# Patient Record
Sex: MALE | Race: WHITE | NOT HISPANIC OR LATINO | Employment: FULL TIME | URBAN - METROPOLITAN AREA
[De-identification: names, ages, dates, MRNs, and addresses within clinical notes are randomized per-mention and may not be internally consistent; named-entity substitution may affect disease eponyms.]

---

## 2017-07-21 ENCOUNTER — RECORDS - HEALTHEAST (OUTPATIENT)
Dept: LAB | Facility: CLINIC | Age: 57
End: 2017-07-21

## 2017-07-21 LAB
CHOLEST SERPL-MCNC: 242 MG/DL
FASTING STATUS PATIENT QL REPORTED: ABNORMAL
HDLC SERPL-MCNC: 38 MG/DL
LDLC SERPL CALC-MCNC: 160 MG/DL
LDLC SERPL CALC-MCNC: ABNORMAL MG/DL
TRIGL SERPL-MCNC: 486 MG/DL

## 2018-10-08 ENCOUNTER — RECORDS - HEALTHEAST (OUTPATIENT)
Dept: LAB | Facility: CLINIC | Age: 58
End: 2018-10-08

## 2018-10-08 LAB
ANION GAP SERPL CALCULATED.3IONS-SCNC: 6 MMOL/L (ref 5–18)
BUN SERPL-MCNC: 18 MG/DL (ref 8–22)
CALCIUM SERPL-MCNC: 10.3 MG/DL (ref 8.5–10.5)
CHLORIDE BLD-SCNC: 105 MMOL/L (ref 98–107)
CHOLEST SERPL-MCNC: 293 MG/DL
CO2 SERPL-SCNC: 27 MMOL/L (ref 22–31)
CREAT SERPL-MCNC: 1.1 MG/DL (ref 0.7–1.3)
FASTING STATUS PATIENT QL REPORTED: YES
GFR SERPL CREATININE-BSD FRML MDRD: >60 ML/MIN/1.73M2
GLUCOSE BLD-MCNC: 106 MG/DL (ref 70–125)
HDLC SERPL-MCNC: 51 MG/DL
LDLC SERPL CALC-MCNC: 221 MG/DL
POTASSIUM BLD-SCNC: 4.1 MMOL/L (ref 3.5–5)
SODIUM SERPL-SCNC: 138 MMOL/L (ref 136–145)
TRIGL SERPL-MCNC: 107 MG/DL

## 2019-08-28 ENCOUNTER — RECORDS - HEALTHEAST (OUTPATIENT)
Dept: LAB | Facility: CLINIC | Age: 59
End: 2019-08-28

## 2019-08-28 LAB
ALBUMIN SERPL-MCNC: 4.4 G/DL (ref 3.5–5)
ALP SERPL-CCNC: 40 U/L (ref 45–120)
ALT SERPL W P-5'-P-CCNC: 44 U/L (ref 0–45)
ANION GAP SERPL CALCULATED.3IONS-SCNC: 7 MMOL/L (ref 5–18)
AST SERPL W P-5'-P-CCNC: 34 U/L (ref 0–40)
BILIRUB SERPL-MCNC: 0.6 MG/DL (ref 0–1)
BUN SERPL-MCNC: 12 MG/DL (ref 8–22)
C REACTIVE PROTEIN LHE: <0.1 MG/DL (ref 0–0.8)
CALCIUM SERPL-MCNC: 10.5 MG/DL (ref 8.5–10.5)
CHLORIDE BLD-SCNC: 105 MMOL/L (ref 98–107)
CHOLEST SERPL-MCNC: 171 MG/DL
CK SERPL-CCNC: 162 U/L (ref 30–190)
CO2 SERPL-SCNC: 29 MMOL/L (ref 22–31)
CREAT SERPL-MCNC: 1.06 MG/DL (ref 0.7–1.3)
ERYTHROCYTE [SEDIMENTATION RATE] IN BLOOD BY WESTERGREN METHOD: 10 MM/HR (ref 0–15)
FASTING STATUS PATIENT QL REPORTED: YES
GFR SERPL CREATININE-BSD FRML MDRD: >60 ML/MIN/1.73M2
GLUCOSE BLD-MCNC: 97 MG/DL (ref 70–125)
HDLC SERPL-MCNC: 41 MG/DL
LDLC SERPL CALC-MCNC: 99 MG/DL
POTASSIUM BLD-SCNC: 4.8 MMOL/L (ref 3.5–5)
PROT SERPL-MCNC: 7.2 G/DL (ref 6–8)
SODIUM SERPL-SCNC: 141 MMOL/L (ref 136–145)
T4 FREE SERPL-MCNC: 0.8 NG/DL (ref 0.7–1.8)
TRIGL SERPL-MCNC: 156 MG/DL
TSH SERPL DL<=0.005 MIU/L-ACNC: 7.74 UIU/ML (ref 0.3–5)

## 2019-10-22 ENCOUNTER — RECORDS - HEALTHEAST (OUTPATIENT)
Dept: LAB | Facility: CLINIC | Age: 59
End: 2019-10-22

## 2019-10-22 LAB — TSH SERPL DL<=0.005 MIU/L-ACNC: 4.37 UIU/ML (ref 0.3–5)

## 2019-11-12 ENCOUNTER — APPOINTMENT (RX ONLY)
Dept: URBAN - METROPOLITAN AREA CLINIC 75 | Facility: CLINIC | Age: 59
Setting detail: DERMATOLOGY
End: 2019-11-12

## 2019-11-12 DIAGNOSIS — L82.0 INFLAMED SEBORRHEIC KERATOSIS: ICD-10-CM

## 2019-11-12 DIAGNOSIS — L57.8 OTHER SKIN CHANGES DUE TO CHRONIC EXPOSURE TO NONIONIZING RADIATION: ICD-10-CM

## 2019-11-12 DIAGNOSIS — Z85.828 PERSONAL HISTORY OF OTHER MALIGNANT NEOPLASM OF SKIN: ICD-10-CM

## 2019-11-12 DIAGNOSIS — D485 NEOPLASM OF UNCERTAIN BEHAVIOR OF SKIN: ICD-10-CM

## 2019-11-12 PROBLEM — D48.5 NEOPLASM OF UNCERTAIN BEHAVIOR OF SKIN: Status: ACTIVE | Noted: 2019-11-12

## 2019-11-12 PROBLEM — I10 ESSENTIAL (PRIMARY) HYPERTENSION: Status: ACTIVE | Noted: 2019-11-12

## 2019-11-12 PROBLEM — B20 HUMAN IMMUNODEFICIENCY VIRUS [HIV] DISEASE: Status: ACTIVE | Noted: 2019-11-12

## 2019-11-12 PROBLEM — E13.9 OTHER SPECIFIED DIABETES MELLITUS WITHOUT COMPLICATIONS: Status: ACTIVE | Noted: 2019-11-12

## 2019-11-12 PROBLEM — K21.9 GASTRO-ESOPHAGEAL REFLUX DISEASE WITHOUT ESOPHAGITIS: Status: ACTIVE | Noted: 2019-11-12

## 2019-11-12 PROBLEM — R23.3 SPONTANEOUS ECCHYMOSES: Status: ACTIVE | Noted: 2019-11-12

## 2019-11-12 PROCEDURE — 11102 TANGNTL BX SKIN SINGLE LES: CPT | Mod: 59

## 2019-11-12 PROCEDURE — 17110 DESTRUCTION B9 LES UP TO 14: CPT

## 2019-11-12 PROCEDURE — ? COUNSELING

## 2019-11-12 PROCEDURE — ? FULL BODY SKIN EXAM

## 2019-11-12 PROCEDURE — ? LIQUID NITROGEN

## 2019-11-12 PROCEDURE — ? DEFER

## 2019-11-12 PROCEDURE — 99203 OFFICE O/P NEW LOW 30 MIN: CPT | Mod: 25

## 2019-11-12 PROCEDURE — ? BIOPSY BY SHAVE METHOD

## 2019-11-12 PROCEDURE — ? ADDITIONAL NOTES

## 2019-11-12 ASSESSMENT — LOCATION ZONE DERM
LOCATION ZONE: TRUNK
LOCATION ZONE: ARM
LOCATION ZONE: NOSE

## 2019-11-12 ASSESSMENT — LOCATION SIMPLE DESCRIPTION DERM
LOCATION SIMPLE: RIGHT UPPER ARM
LOCATION SIMPLE: NOSE
LOCATION SIMPLE: ABDOMEN

## 2019-11-12 ASSESSMENT — LOCATION DETAILED DESCRIPTION DERM
LOCATION DETAILED: PERIUMBILICAL SKIN
LOCATION DETAILED: RIGHT ANTERIOR LATERAL PROXIMAL UPPER ARM
LOCATION DETAILED: NASAL SUPRATIP

## 2019-11-12 NOTE — PROCEDURE: LIQUID NITROGEN

## 2019-11-12 NOTE — PROCEDURE: BIOPSY BY SHAVE METHOD
Bill For Surgical Tray: no
Depth Of Biopsy: dermis
Path Notes (To The Dermatopathologist): No prior path
Electrodesiccation Text: The wound bed was treated with electrodesiccation after the biopsy was performed.
Biopsy Type: H and E
Consent: Written consent was obtained and risks were reviewed including but not limited to scarring, infection, bleeding, scabbing, incomplete removal, nerve damage and allergy to anesthesia.
X Size Of Lesion In Cm: 0
Type Of Destruction Used: Curettage
Anesthesia Volume In Cc (Will Not Render If 0): 0.5
Electrodesiccation And Curettage Text: The wound bed was treated with electrodesiccation and curettage after the biopsy was performed.
Dressing: bandage
Silver Nitrate Text: The wound bed was treated with silver nitrate after the biopsy was performed.
Was A Bandage Applied: Yes
Detail Level: Detailed
Biopsy Method: Dermablade
Hemostasis: Drysol
Lab: -[id number]
Billing Type: Third-Party Bill
Curettage Text: The wound bed was treated with curettage after the biopsy was performed.
Post-Care Instructions: I reviewed with the patient in detail post-care instructions. Patient is to keep the biopsy site dry overnight, and then apply bacitracin twice daily until healed. Patient may apply hydrogen peroxide soaks to remove any crusting.
Cryotherapy Text: The wound bed was treated with cryotherapy after the biopsy was performed.
Anesthesia Type: 1% lidocaine without epinephrine
Lab Facility: [id number]
Wound Care: Petrolatum
Notification Instructions: Patient will be notified of biopsy results. However, patient instructed to call the office if not contacted within 2 weeks.

## 2019-11-12 NOTE — PROCEDURE: ADDITIONAL NOTES
Detail Level: Simple
Additional Notes: Site of previous cyst excision. I would favor keloid at this site. It is itchy and he would like to have removed which would be best accomplished by excision. Will schedule.

## 2019-11-19 ENCOUNTER — APPOINTMENT (RX ONLY)
Dept: URBAN - METROPOLITAN AREA CLINIC 75 | Facility: CLINIC | Age: 59
Setting detail: DERMATOLOGY
End: 2019-11-19

## 2019-11-19 DIAGNOSIS — L72.8 OTHER FOLLICULAR CYSTS OF THE SKIN AND SUBCUTANEOUS TISSUE: ICD-10-CM

## 2019-11-19 PROBLEM — D48.5 NEOPLASM OF UNCERTAIN BEHAVIOR OF SKIN: Status: ACTIVE | Noted: 2019-11-19

## 2019-11-19 PROCEDURE — 11402 EXC TR-EXT B9+MARG 1.1-2 CM: CPT | Mod: 79

## 2019-11-19 PROCEDURE — 12031 INTMD RPR S/A/T/EXT 2.5 CM/<: CPT | Mod: 79

## 2019-11-19 PROCEDURE — ? EXCISION

## 2019-11-19 ASSESSMENT — LOCATION DETAILED DESCRIPTION DERM: LOCATION DETAILED: RIGHT ANTERIOR PROXIMAL UPPER ARM

## 2019-11-19 ASSESSMENT — LOCATION ZONE DERM: LOCATION ZONE: ARM

## 2019-11-19 ASSESSMENT — LOCATION SIMPLE DESCRIPTION DERM: LOCATION SIMPLE: RIGHT UPPER ARM

## 2019-11-19 NOTE — PROCEDURE: EXCISION
Bilobed Flap Text: The defect edges were debeveled with a #15 scalpel blade.  Given the location of the defect and the proximity to free margins a bilobe flap was deemed most appropriate.  Using a sterile surgical marker, an appropriate bilobe flap drawn around the defect.    The area thus outlined was incised deep to adipose tissue with a #15 scalpel blade.  The skin margins were undermined to an appropriate distance in all directions utilizing iris scissors.
Complex Repair And Dermal Autograft Text: The defect edges were debeveled with a #15 scalpel blade.  The primary defect was closed partially with a complex linear closure.  Given the location of the defect, shape of the defect and the proximity to free margins an dermal autograft was deemed most appropriate to repair the remaining defect.  The graft was trimmed to fit the size of the remaining defect.  The graft was then placed in the primary defect, oriented appropriately, and sutured into place.
Double O-Z Flap Text: The defect edges were debeveled with a #15 scalpel blade.  Given the location of the defect, shape of the defect and the proximity to free margins a Double O-Z flap was deemed most appropriate.  Using a sterile surgical marker, an appropriate transposition flap was drawn incorporating the defect and placing the expected incisions within the relaxed skin tension lines where possible. The area thus outlined was incised deep to adipose tissue with a #15 scalpel blade.  The skin margins were undermined to an appropriate distance in all directions utilizing iris scissors.
Intermediate / Complex Repair - Final Wound Length In Cm: 2
Crescentic Advancement Flap Text: The defect edges were debeveled with a #15 scalpel blade.  Given the location of the defect and the proximity to free margins a crescentic advancement flap was deemed most appropriate.  Using a sterile surgical marker, the appropriate advancement flap was drawn incorporating the defect and placing the expected incisions within the relaxed skin tension lines where possible.    The area thus outlined was incised deep to adipose tissue with a #15 scalpel blade.  The skin margins were undermined to an appropriate distance in all directions utilizing iris scissors.
Scalpel Size: 15 blade
Bill 25984 For Specimen Handling/Conveyance To Laboratory?: no
Skin Substitute Units (Will Override Primary Defect Units If Greater Than 0): 0
Complex Repair And Double Advancement Flap Text: The defect edges were debeveled with a #15 scalpel blade.  The primary defect was closed partially with a complex linear closure.  Given the location of the remaining defect, shape of the defect and the proximity to free margins a double advancement flap was deemed most appropriate for complete closure of the defect.  Using a sterile surgical marker, an appropriate advancement flap was drawn incorporating the defect and placing the expected incisions within the relaxed skin tension lines where possible.    The area thus outlined was incised deep to adipose tissue with a #15 scalpel blade.  The skin margins were undermined to an appropriate distance in all directions utilizing iris scissors.
M-Plasty Complex Repair Preamble Text (Leave Blank If You Do Not Want): Extensive wide undermining was performed.
Cheek-To-Nose Interpolation Flap Text: A decision was made to reconstruct the defect utilizing an interpolation axial flap and a staged reconstruction.  A telfa template was made of the defect.  This telfa template was then used to outline the Cheek-To-Nose Interpolation flap.  The donor area for the pedicle flap was then injected with anesthesia.  The flap was excised through the skin and subcutaneous tissue down to the layer of the underlying musculature.  The interpolation flap was carefully excised within this deep plane to maintain its blood supply.  The edges of the donor site were undermined.   The donor site was closed in a primary fashion.  The pedicle was then rotated into position and sutured.  Once the tube was sutured into place, adequate blood supply was confirmed with blanching and refill.  The pedicle was then wrapped with xeroform gauze and dressed appropriately with a telfa and gauze bandage to ensure continued blood supply and protect the attached pedicle.
Crescentic Intermediate Repair Preamble Text (Leave Blank If You Do Not Want): Undermining was performed with blunt dissection.
Interpolation Flap Text: A decision was made to reconstruct the defect utilizing an interpolation axial flap and a staged reconstruction.  A telfa template was made of the defect.  This telfa template was then used to outline the interpolation flap.  The donor area for the pedicle flap was then injected with anesthesia.  The flap was excised through the skin and subcutaneous tissue down to the layer of the underlying musculature.  The interpolation flap was carefully excised within this deep plane to maintain its blood supply.  The edges of the donor site were undermined.   The donor site was closed in a primary fashion.  The pedicle was then rotated into position and sutured.  Once the tube was sutured into place, adequate blood supply was confirmed with blanching and refill.  The pedicle was then wrapped with xeroform gauze and dressed appropriately with a telfa and gauze bandage to ensure continued blood supply and protect the attached pedicle.
Show Pathology Comment Variable: Yes
Complex Repair And Tissue Cultured Epidermal Autograft Text: The defect edges were debeveled with a #15 scalpel blade.  The primary defect was closed partially with a complex linear closure.  Given the location of the defect, shape of the defect and the proximity to free margins an tissue cultured epidermal autograft was deemed most appropriate to repair the remaining defect.  The graft was trimmed to fit the size of the remaining defect.  The graft was then placed in the primary defect, oriented appropriately, and sutured into place.
Bilobed Transposition Flap Text: The defect edges were debeveled with a #15 scalpel blade.  Given the location of the defect and the proximity to free margins a bilobed transposition flap was deemed most appropriate.  Using a sterile surgical marker, an appropriate bilobe flap drawn around the defect.    The area thus outlined was incised deep to adipose tissue with a #15 scalpel blade.  The skin margins were undermined to an appropriate distance in all directions utilizing iris scissors.
Complex Repair And Modified Advancement Flap Text: The defect edges were debeveled with a #15 scalpel blade.  The primary defect was closed partially with a complex linear closure.  Given the location of the remaining defect, shape of the defect and the proximity to free margins a modified advancement flap was deemed most appropriate for complete closure of the defect.  Using a sterile surgical marker, an appropriate advancement flap was drawn incorporating the defect and placing the expected incisions within the relaxed skin tension lines where possible.    The area thus outlined was incised deep to adipose tissue with a #15 scalpel blade.  The skin margins were undermined to an appropriate distance in all directions utilizing iris scissors.
A-T Advancement Flap Text: The defect edges were debeveled with a #15 scalpel blade.  Given the location of the defect, shape of the defect and the proximity to free margins an A-T advancement flap was deemed most appropriate.  Using a sterile surgical marker, an appropriate advancement flap was drawn incorporating the defect and placing the expected incisions within the relaxed skin tension lines where possible.    The area thus outlined was incised deep to adipose tissue with a #15 scalpel blade.  The skin margins were undermined to an appropriate distance in all directions utilizing iris scissors.
V-Y Flap Text: The defect edges were debeveled with a #15 scalpel blade.  Given the location of the defect, shape of the defect and the proximity to free margins a V-Y flap was deemed most appropriate.  Using a sterile surgical marker, an appropriate advancement flap was drawn incorporating the defect and placing the expected incisions within the relaxed skin tension lines where possible.    The area thus outlined was incised deep to adipose tissue with a #15 scalpel blade.  The skin margins were undermined to an appropriate distance in all directions utilizing iris scissors.
O-L Flap Text: The defect edges were debeveled with a #15 scalpel blade.  Given the location of the defect, shape of the defect and the proximity to free margins an O-L flap was deemed most appropriate.  Using a sterile surgical marker, an appropriate advancement flap was drawn incorporating the defect and placing the expected incisions within the relaxed skin tension lines where possible.    The area thus outlined was incised deep to adipose tissue with a #15 scalpel blade.  The skin margins were undermined to an appropriate distance in all directions utilizing iris scissors.
Complex Repair And O-T Advancement Flap Text: The defect edges were debeveled with a #15 scalpel blade.  The primary defect was closed partially with a complex linear closure.  Given the location of the remaining defect, shape of the defect and the proximity to free margins an O-T advancement flap was deemed most appropriate for complete closure of the defect.  Using a sterile surgical marker, an appropriate advancement flap was drawn incorporating the defect and placing the expected incisions within the relaxed skin tension lines where possible.    The area thus outlined was incised deep to adipose tissue with a #15 scalpel blade.  The skin margins were undermined to an appropriate distance in all directions utilizing iris scissors.
Modified Advancement Flap Text: The defect edges were debeveled with a #15 scalpel blade.  Given the location of the defect, shape of the defect and the proximity to free margins a modified advancement flap was deemed most appropriate.  Using a sterile surgical marker, an appropriate advancement flap was drawn incorporating the defect and placing the expected incisions within the relaxed skin tension lines where possible.    The area thus outlined was incised deep to adipose tissue with a #15 scalpel blade.  The skin margins were undermined to an appropriate distance in all directions utilizing iris scissors.
Mastoid Interpolation Flap Text: A decision was made to reconstruct the defect utilizing an interpolation axial flap and a staged reconstruction.  A telfa template was made of the defect.  This telfa template was then used to outline the mastoid interpolation flap.  The donor area for the pedicle flap was then injected with anesthesia.  The flap was excised through the skin and subcutaneous tissue down to the layer of the underlying musculature.  The pedicle flap was carefully excised within this deep plane to maintain its blood supply.  The edges of the donor site were undermined.   The donor site was closed in a primary fashion.  The pedicle was then rotated into position and sutured.  Once the tube was sutured into place, adequate blood supply was confirmed with blanching and refill.  The pedicle was then wrapped with xeroform gauze and dressed appropriately with a telfa and gauze bandage to ensure continued blood supply and protect the attached pedicle.
Mercedes Flap Text: The defect edges were debeveled with a #15 scalpel blade.  Given the location of the defect, shape of the defect and the proximity to free margins a Mercedes flap was deemed most appropriate.  Using a sterile surgical marker, an appropriate advancement flap was drawn incorporating the defect and placing the expected incisions within the relaxed skin tension lines where possible. The area thus outlined was incised deep to adipose tissue with a #15 scalpel blade.  The skin margins were undermined to an appropriate distance in all directions utilizing iris scissors.
Complex Repair And A-T Advancement Flap Text: The defect edges were debeveled with a #15 scalpel blade.  The primary defect was closed partially with a complex linear closure.  Given the location of the remaining defect, shape of the defect and the proximity to free margins an A-T advancement flap was deemed most appropriate for complete closure of the defect.  Using a sterile surgical marker, an appropriate advancement flap was drawn incorporating the defect and placing the expected incisions within the relaxed skin tension lines where possible.    The area thus outlined was incised deep to adipose tissue with a #15 scalpel blade.  The skin margins were undermined to an appropriate distance in all directions utilizing iris scissors.
Melolabial Interpolation Flap Text: A decision was made to reconstruct the defect utilizing an interpolation axial flap and a staged reconstruction.  A telfa template was made of the defect.  This telfa template was then used to outline the melolabial interpolation flap.  The donor area for the pedicle flap was then injected with anesthesia.  The flap was excised through the skin and subcutaneous tissue down to the layer of the underlying musculature.  The pedicle flap was carefully excised within this deep plane to maintain its blood supply.  The edges of the donor site were undermined.   The donor site was closed in a primary fashion.  The pedicle was then rotated into position and sutured.  Once the tube was sutured into place, adequate blood supply was confirmed with blanching and refill.  The pedicle was then wrapped with xeroform gauze and dressed appropriately with a telfa and gauze bandage to ensure continued blood supply and protect the attached pedicle.
O-T Advancement Flap Text: The defect edges were debeveled with a #15 scalpel blade.  Given the location of the defect, shape of the defect and the proximity to free margins an O-T advancement flap was deemed most appropriate.  Using a sterile surgical marker, an appropriate advancement flap was drawn incorporating the defect and placing the expected incisions within the relaxed skin tension lines where possible.    The area thus outlined was incised deep to adipose tissue with a #15 scalpel blade.  The skin margins were undermined to an appropriate distance in all directions utilizing iris scissors.
Trilobed Flap Text: The defect edges were debeveled with a #15 scalpel blade.  Given the location of the defect and the proximity to free margins a trilobed flap was deemed most appropriate.  Using a sterile surgical marker, an appropriate trilobed flap drawn around the defect.    The area thus outlined was incised deep to adipose tissue with a #15 scalpel blade.  The skin margins were undermined to an appropriate distance in all directions utilizing iris scissors.
Complex Repair And Xenograft Text: The defect edges were debeveled with a #15 scalpel blade.  The primary defect was closed partially with a complex linear closure.  Given the location of the defect, shape of the defect and the proximity to free margins a xenograft was deemed most appropriate to repair the remaining defect.  The graft was trimmed to fit the size of the remaining defect.  The graft was then placed in the primary defect, oriented appropriately, and sutured into place.
Island Pedicle Flap Text: The defect edges were debeveled with a #15 scalpel blade.  Given the location of the defect, shape of the defect and the proximity to free margins an island pedicle advancement flap was deemed most appropriate.  Using a sterile surgical marker, an appropriate advancement flap was drawn incorporating the defect, outlining the appropriate donor tissue and placing the expected incisions within the relaxed skin tension lines where possible.    The area thus outlined was incised deep to adipose tissue with a #15 scalpel blade.  The skin margins were undermined to an appropriate distance in all directions around the primary defect and laterally outward around the island pedicle utilizing iris scissors.  There was minimal undermining beneath the pedicle flap.
Mucosal Advancement Flap Text: Given the location of the defect, shape of the defect and the proximity to free margins a mucosal advancement flap was deemed most appropriate. Incisions were made with a 15 blade scalpel in the appropriate fashion along the cutaneous vermillion border and the mucosal lip. The remaining actinically damaged mucosal tissue was excised.  The mucosal advancement flap was then elevated to the gingival sulcus with care taken to preserve the neurovascular structures and advanced into the primary defect. Care was taken to ensure that precise realignment of the vermilion border was achieved.
Complex Repair And O-L Flap Text: The defect edges were debeveled with a #15 scalpel blade.  The primary defect was closed partially with a complex linear closure.  Given the location of the remaining defect, shape of the defect and the proximity to free margins an O-L flap was deemed most appropriate for complete closure of the defect.  Using a sterile surgical marker, an appropriate flap was drawn incorporating the defect and placing the expected incisions within the relaxed skin tension lines where possible.    The area thus outlined was incised deep to adipose tissue with a #15 scalpel blade.  The skin margins were undermined to an appropriate distance in all directions utilizing iris scissors.
Anesthesia Type: 1% lidocaine with epinephrine
Complex Repair And Skin Substitute Graft Text: The defect edges were debeveled with a #15 scalpel blade.  The primary defect was closed partially with a complex linear closure.  Given the location of the remaining defect, shape of the defect and the proximity to free margins a skin substitute graft was deemed most appropriate to repair the remaining defect.  The graft was trimmed to fit the size of the remaining defect.  The graft was then placed in the primary defect, oriented appropriately, and sutured into place.
Dorsal Nasal Flap Text: The defect edges were debeveled with a #15 scalpel blade.  Given the location of the defect and the proximity to free margins a dorsal nasal flap was deemed most appropriate.  Using a sterile surgical marker, an appropriate dorsal nasal flap was drawn around the defect.    The area thus outlined was incised deep to adipose tissue with a #15 scalpel blade.  The skin margins were undermined to an appropriate distance in all directions utilizing iris scissors.
Epidermal Closure: running
Hatchet Flap Text: The defect edges were debeveled with a #15 scalpel blade.  Given the location of the defect, shape of the defect and the proximity to free margins a hatchet flap was deemed most appropriate.  Using a sterile surgical marker, an appropriate hatchet flap was drawn incorporating the defect and placing the expected incisions within the relaxed skin tension lines where possible.    The area thus outlined was incised deep to adipose tissue with a #15 scalpel blade.  The skin margins were undermined to an appropriate distance in all directions utilizing iris scissors.
Complex Repair And Bilobe Flap Text: The defect edges were debeveled with a #15 scalpel blade.  The primary defect was closed partially with a complex linear closure.  Given the location of the remaining defect, shape of the defect and the proximity to free margins a bilobe flap was deemed most appropriate for complete closure of the defect.  Using a sterile surgical marker, an appropriate advancement flap was drawn incorporating the defect and placing the expected incisions within the relaxed skin tension lines where possible.    The area thus outlined was incised deep to adipose tissue with a #15 scalpel blade.  The skin margins were undermined to an appropriate distance in all directions utilizing iris scissors.
Medical Necessity Information: It is in your best interest to select a reason for this procedure from the list below. All of these items fulfill various CMS LCD requirements except lesion extends to a margin.
Paramedian Forehead Flap Text: A decision was made to reconstruct the defect utilizing an interpolation axial flap and a staged reconstruction.  A telfa template was made of the defect.  This telfa template was then used to outline the paramedian forehead pedicle flap.  The donor area for the pedicle flap was then injected with anesthesia.  The flap was excised through the skin and subcutaneous tissue down to the layer of the underlying musculature.  The pedicle flap was carefully excised within this deep plane to maintain its blood supply.  The edges of the donor site were undermined.   The donor site was closed in a primary fashion.  The pedicle was then rotated into position and sutured.  Once the tube was sutured into place, adequate blood supply was confirmed with blanching and refill.  The pedicle was then wrapped with xeroform gauze and dressed appropriately with a telfa and gauze bandage to ensure continued blood supply and protect the attached pedicle.
Island Pedicle Flap With Canthal Suspension Text: The defect edges were debeveled with a #15 scalpel blade.  Given the location of the defect, shape of the defect and the proximity to free margins an island pedicle advancement flap was deemed most appropriate.  Using a sterile surgical marker, an appropriate advancement flap was drawn incorporating the defect, outlining the appropriate donor tissue and placing the expected incisions within the relaxed skin tension lines where possible. The area thus outlined was incised deep to adipose tissue with a #15 scalpel blade.  The skin margins were undermined to an appropriate distance in all directions around the primary defect and laterally outward around the island pedicle utilizing iris scissors.  There was minimal undermining beneath the pedicle flap. A suspension suture was placed in the canthal tendon to prevent tension and prevent ectropion.
Posterior Auricular Interpolation Flap Text: A decision was made to reconstruct the defect utilizing an interpolation axial flap and a staged reconstruction.  A telfa template was made of the defect.  This telfa template was then used to outline the posterior auricular interpolation flap.  The donor area for the pedicle flap was then injected with anesthesia.  The flap was excised through the skin and subcutaneous tissue down to the layer of the underlying musculature.  The pedicle flap was carefully excised within this deep plane to maintain its blood supply.  The edges of the donor site were undermined.   The donor site was closed in a primary fashion.  The pedicle was then rotated into position and sutured.  Once the tube was sutured into place, adequate blood supply was confirmed with blanching and refill.  The pedicle was then wrapped with xeroform gauze and dressed appropriately with a telfa and gauze bandage to ensure continued blood supply and protect the attached pedicle.
Path Notes (To The Dermatopathologist): Please check margins.
Lip Wedge Excision Repair Text: Given the location of the defect and the proximity to free margins a full thickness wedge repair was deemed most appropriate.  Using a sterile surgical marker, the appropriate repair was drawn incorporating the defect and placing the expected incisions perpendicular to the vermilion border.  The vermilion border was also meticulously outlined to ensure appropriate reapproximation during the repair.  The area thus outlined was incised through and through with a #15 scalpel blade.  The muscularis and dermis were reaproximated with deep sutures following hemostasis. Care was taken to realign the vermilion border before proceeding with the superficial closure.  Once the vermilion was realigned the superfical and mucosal closure was finished.
Alar Island Pedicle Flap Text: The defect edges were debeveled with a #15 scalpel blade.  Given the location of the defect, shape of the defect and the proximity to the alar rim an island pedicle advancement flap was deemed most appropriate.  Using a sterile surgical marker, an appropriate advancement flap was drawn incorporating the defect, outlining the appropriate donor tissue and placing the expected incisions within the nasal ala running parallel to the alar rim. The area thus outlined was incised with a #15 scalpel blade.  The skin margins were undermined minimally to an appropriate distance in all directions around the primary defect and laterally outward around the island pedicle utilizing iris scissors.  There was minimal undermining beneath the pedicle flap.
Complex Repair And Melolabial Flap Text: The defect edges were debeveled with a #15 scalpel blade.  The primary defect was closed partially with a complex linear closure.  Given the location of the remaining defect, shape of the defect and the proximity to free margins a melolabial flap was deemed most appropriate for complete closure of the defect.  Using a sterile surgical marker, an appropriate advancement flap was drawn incorporating the defect and placing the expected incisions within the relaxed skin tension lines where possible.    The area thus outlined was incised deep to adipose tissue with a #15 scalpel blade.  The skin margins were undermined to an appropriate distance in all directions utilizing iris scissors.
Rotation Flap Text: The defect edges were debeveled with a #15 scalpel blade.  Given the location of the defect, shape of the defect and the proximity to free margins a rotation flap was deemed most appropriate.  Using a sterile surgical marker, an appropriate rotation flap was drawn incorporating the defect and placing the expected incisions within the relaxed skin tension lines where possible.    The area thus outlined was incised deep to adipose tissue with a #15 scalpel blade.  The skin margins were undermined to an appropriate distance in all directions utilizing iris scissors.
Fusiform Excision Additional Text (Leave Blank If You Do Not Want): The margin was drawn around the clinically apparent lesion.  A fusiform shape was then drawn on the skin incorporating the lesion and margins.  Incisions were then made along these lines to the appropriate tissue plane and the lesion was extirpated.
Saucerization Excision Additional Text (Leave Blank If You Do Not Want): The margin was drawn around the clinically apparent lesion.  Incisions were then made along these lines, in a tangential fashion, to the appropriate tissue plane and the lesion was extirpated.
Consent was obtained from the patient. The risks and benefits to therapy were discussed in detail. Specifically, the risks of infection, scarring, bleeding, prolonged wound healing, incomplete removal, allergy to anesthesia, nerve injury and recurrence were addressed. Prior to the procedure, the treatment site was clearly identified and confirmed by the patient. All components of Universal Protocol/PAUSE Rule completed.
Ftsg Text: The defect edges were debeveled with a #15 scalpel blade.  Given the location of the defect, shape of the defect and the proximity to free margins a full thickness skin graft was deemed most appropriate.  Using a sterile surgical marker, the primary defect shape was transferred to the donor site. The area thus outlined was incised deep to adipose tissue with a #15 scalpel blade.  The harvested graft was then trimmed of adipose tissue until only dermis and epidermis was left.  The skin margins of the secondary defect were undermined to an appropriate distance in all directions utilizing iris scissors.  The secondary defect was closed with interrupted buried subcutaneous sutures.  The skin edges were then re-apposed with running  sutures.  The skin graft was then placed in the primary defect and oriented appropriately.
Double Island Pedicle Flap Text: The defect edges were debeveled with a #15 scalpel blade.  Given the location of the defect, shape of the defect and the proximity to free margins a double island pedicle advancement flap was deemed most appropriate.  Using a sterile surgical marker, an appropriate advancement flap was drawn incorporating the defect, outlining the appropriate donor tissue and placing the expected incisions within the relaxed skin tension lines where possible.    The area thus outlined was incised deep to adipose tissue with a #15 scalpel blade.  The skin margins were undermined to an appropriate distance in all directions around the primary defect and laterally outward around the island pedicle utilizing iris scissors.  There was minimal undermining beneath the pedicle flap.
Eliptical Excision Additional Text (Leave Blank If You Do Not Want): The margin was drawn around the clinically apparent lesion.  An elliptical shape was then drawn on the skin incorporating the lesion and margins.  Incisions were then made along these lines to the appropriate tissue plane and the lesion was extirpated.
Spiral Flap Text: The defect edges were debeveled with a #15 scalpel blade.  Given the location of the defect, shape of the defect and the proximity to free margins a spiral flap was deemed most appropriate.  Using a sterile surgical marker, an appropriate rotation flap was drawn incorporating the defect and placing the expected incisions within the relaxed skin tension lines where possible. The area thus outlined was incised deep to adipose tissue with a #15 scalpel blade.  The skin margins were undermined to an appropriate distance in all directions utilizing iris scissors.
Complex Repair And Rotation Flap Text: The defect edges were debeveled with a #15 scalpel blade.  The primary defect was closed partially with a complex linear closure.  Given the location of the remaining defect, shape of the defect and the proximity to free margins a rotation flap was deemed most appropriate for complete closure of the defect.  Using a sterile surgical marker, an appropriate advancement flap was drawn incorporating the defect and placing the expected incisions within the relaxed skin tension lines where possible.    The area thus outlined was incised deep to adipose tissue with a #15 scalpel blade.  The skin margins were undermined to an appropriate distance in all directions utilizing iris scissors.
Deep Sutures: 4-0 Monocryl
Size Of Lesion In Cm: 1.1
Complex Repair And Transposition Flap Text: The defect edges were debeveled with a #15 scalpel blade.  The primary defect was closed partially with a complex linear closure.  Given the location of the remaining defect, shape of the defect and the proximity to free margins a transposition flap was deemed most appropriate for complete closure of the defect.  Using a sterile surgical marker, an appropriate advancement flap was drawn incorporating the defect and placing the expected incisions within the relaxed skin tension lines where possible.    The area thus outlined was incised deep to adipose tissue with a #15 scalpel blade.  The skin margins were undermined to an appropriate distance in all directions utilizing iris scissors.
Transposition Flap Text: The defect edges were debeveled with a #15 scalpel blade.  Given the location of the defect and the proximity to free margins a transposition flap was deemed most appropriate.  Using a sterile surgical marker, an appropriate transposition flap was drawn incorporating the defect.    The area thus outlined was incised deep to adipose tissue with a #15 scalpel blade.  The skin margins were undermined to an appropriate distance in all directions utilizing iris scissors.
Slit Excision Additional Text (Leave Blank If You Do Not Want): A linear line was drawn on the skin overlying the lesion. An incision was made slowly until the lesion was visualized.  Once visualized, the lesion was removed with blunt dissection.
Star Wedge Flap Text: The defect edges were debeveled with a #15 scalpel blade.  Given the location of the defect, shape of the defect and the proximity to free margins a star wedge flap was deemed most appropriate.  Using a sterile surgical marker, an appropriate rotation flap was drawn incorporating the defect and placing the expected incisions within the relaxed skin tension lines where possible. The area thus outlined was incised deep to adipose tissue with a #15 scalpel blade.  The skin margins were undermined to an appropriate distance in all directions utilizing iris scissors.
Complex Repair And Rhombic Flap Text: The defect edges were debeveled with a #15 scalpel blade.  The primary defect was closed partially with a complex linear closure.  Given the location of the remaining defect, shape of the defect and the proximity to free margins a rhombic flap was deemed most appropriate for complete closure of the defect.  Using a sterile surgical marker, an appropriate advancement flap was drawn incorporating the defect and placing the expected incisions within the relaxed skin tension lines where possible.    The area thus outlined was incised deep to adipose tissue with a #15 scalpel blade.  The skin margins were undermined to an appropriate distance in all directions utilizing iris scissors.
Split-Thickness Skin Graft Text: The defect edges were debeveled with a #15 scalpel blade.  Given the location of the defect, shape of the defect and the proximity to free margins a split thickness skin graft was deemed most appropriate.  Using a sterile surgical marker, the primary defect shape was transferred to the donor site. The split thickness graft was then harvested.  The skin graft was then placed in the primary defect and oriented appropriately.
Island Pedicle Flap-Requiring Vessel Identification Text: The defect edges were debeveled with a #15 scalpel blade.  Given the location of the defect, shape of the defect and the proximity to free margins an island pedicle advancement flap was deemed most appropriate.  Using a sterile surgical marker, an appropriate advancement flap was drawn, based on the axial vessel mentioned above, incorporating the defect, outlining the appropriate donor tissue and placing the expected incisions within the relaxed skin tension lines where possible.    The area thus outlined was incised deep to adipose tissue with a #15 scalpel blade.  The skin margins were undermined to an appropriate distance in all directions around the primary defect and laterally outward around the island pedicle utilizing iris scissors.  There was minimal undermining beneath the pedicle flap.
Wound Care: Petrolatum
Dressing: dry sterile dressing
Additional Anesthesia Volume In Cc: 6
O-T Plasty Text: The defect edges were debeveled with a #15 scalpel blade.  Given the location of the defect, shape of the defect and the proximity to free margins an O-T plasty was deemed most appropriate.  Using a sterile surgical marker, an appropriate O-T plasty was drawn incorporating the defect and placing the expected incisions within the relaxed skin tension lines where possible.    The area thus outlined was incised deep to adipose tissue with a #15 scalpel blade.  The skin margins were undermined to an appropriate distance in all directions utilizing iris scissors.
Excisional Biopsy Additional Text (Leave Blank If You Do Not Want): The margin was drawn around the clinically apparent lesion. An elliptical shape was then drawn on the skin incorporating the lesion and margins.  Incisions were then made along these lines to the appropriate tissue plane and the lesion was extirpated.
Muscle Hinge Flap Text: The defect edges were debeveled with a #15 scalpel blade.  Given the size, depth and location of the defect and the proximity to free margins a muscle hinge flap was deemed most appropriate.  Using a sterile surgical marker, an appropriate hinge flap was drawn incorporating the defect. The area thus outlined was incised with a #15 scalpel blade.  The skin margins were undermined to an appropriate distance in all directions utilizing iris scissors.
Complex Repair And V-Y Plasty Text: The defect edges were debeveled with a #15 scalpel blade.  The primary defect was closed partially with a complex linear closure.  Given the location of the remaining defect, shape of the defect and the proximity to free margins a V-Y plasty was deemed most appropriate for complete closure of the defect.  Using a sterile surgical marker, an appropriate advancement flap was drawn incorporating the defect and placing the expected incisions within the relaxed skin tension lines where possible.    The area thus outlined was incised deep to adipose tissue with a #15 scalpel blade.  The skin margins were undermined to an appropriate distance in all directions utilizing iris scissors.
Cartilage Graft Text: The defect edges were debeveled with a #15 scalpel blade.  Given the location of the defect, shape of the defect, the fact the defect involved a full thickness cartilage defect a cartilage graft was deemed most appropriate.  An appropriate donor site was identified, cleansed, and anesthetized. The cartilage graft was then harvested and transferred to the recipient site, oriented appropriately and then sutured into place.  The secondary defect was then repaired using a primary closure.
Keystone Flap Text: The defect edges were debeveled with a #15 scalpel blade.  Given the location of the defect, shape of the defect a keystone flap was deemed most appropriate.  Using a sterile surgical marker, an appropriate keystone flap was drawn incorporating the defect, outlining the appropriate donor tissue and placing the expected incisions within the relaxed skin tension lines where possible. The area thus outlined was incised deep to adipose tissue with a #15 scalpel blade.  The skin margins were undermined to an appropriate distance in all directions around the primary defect and laterally outward around the flap utilizing iris scissors.
O-Z Plasty Text: The defect edges were debeveled with a #15 scalpel blade.  Given the location of the defect, shape of the defect and the proximity to free margins an O-Z plasty (double transposition flap) was deemed most appropriate.  Using a sterile surgical marker, the appropriate transposition flaps were drawn incorporating the defect and placing the expected incisions within the relaxed skin tension lines where possible.    The area thus outlined was incised deep to adipose tissue with a #15 scalpel blade.  The skin margins were undermined to an appropriate distance in all directions utilizing iris scissors.  Hemostasis was achieved with electrocautery.  The flaps were then transposed into place, one clockwise and the other counterclockwise, and anchored with interrupted buried subcutaneous sutures.
Melolabial Transposition Flap Text: The defect edges were debeveled with a #15 scalpel blade.  Given the location of the defect and the proximity to free margins a melolabial flap was deemed most appropriate.  Using a sterile surgical marker, an appropriate melolabial transposition flap was drawn incorporating the defect.    The area thus outlined was incised deep to adipose tissue with a #15 scalpel blade.  The skin margins were undermined to an appropriate distance in all directions utilizing iris scissors.
Complex Repair And M Plasty Text: The defect edges were debeveled with a #15 scalpel blade.  The primary defect was closed partially with a complex linear closure.  Given the location of the remaining defect, shape of the defect and the proximity to free margins an M plasty was deemed most appropriate for complete closure of the defect.  Using a sterile surgical marker, an appropriate advancement flap was drawn incorporating the defect and placing the expected incisions within the relaxed skin tension lines where possible.    The area thus outlined was incised deep to adipose tissue with a #15 scalpel blade.  The skin margins were undermined to an appropriate distance in all directions utilizing iris scissors.
Home Suture Removal Text: Patient was provided a home suture removal kit and will remove their sutures at home.  If they have any questions or difficulties they will call the office.
Epidermal Autograft Text: The defect edges were debeveled with a #15 scalpel blade.  Given the location of the defect, shape of the defect and the proximity to free margins an epidermal autograft was deemed most appropriate.  Using a sterile surgical marker, the primary defect shape was transferred to the donor site. The epidermal graft was then harvested.  The skin graft was then placed in the primary defect and oriented appropriately.
Perilesional Excision Additional Text (Leave Blank If You Do Not Want): The margin was drawn around the clinically apparent lesion. Incisions were then made along these lines to the appropriate tissue plane and the lesion was extirpated.
Composite Graft Text: The defect edges were debeveled with a #15 scalpel blade.  Given the location of the defect, shape of the defect, the proximity to free margins and the fact the defect was full thickness a composite graft was deemed most appropriate.  The defect was outline and then transferred to the donor site.  A full thickness graft was then excised from the donor site. The graft was then placed in the primary defect, oriented appropriately and then sutured into place.  The secondary defect was then repaired using a primary closure.
Post-Care Instructions: I reviewed with the patient in detail post-care instructions. Patient is not to engage in any heavy lifting, exercise, or swimming for the next 14 days. Should the patient develop any fevers, chills, bleeding, severe pain patient will contact the office immediately.
Dermal Autograft Text: The defect edges were debeveled with a #15 scalpel blade.  Given the location of the defect, shape of the defect and the proximity to free margins a dermal autograft was deemed most appropriate.  Using a sterile surgical marker, the primary defect shape was transferred to the donor site. The area thus outlined was incised deep to adipose tissue with a #15 scalpel blade.  The harvested graft was then trimmed of adipose and epidermal tissue until only dermis was left.  The skin graft was then placed in the primary defect and oriented appropriately.
Double O-Z Plasty Text: The defect edges were debeveled with a #15 scalpel blade.  Given the location of the defect, shape of the defect and the proximity to free margins a Double O-Z plasty (double transposition flap) was deemed most appropriate.  Using a sterile surgical marker, the appropriate transposition flaps were drawn incorporating the defect and placing the expected incisions within the relaxed skin tension lines where possible. The area thus outlined was incised deep to adipose tissue with a #15 scalpel blade.  The skin margins were undermined to an appropriate distance in all directions utilizing iris scissors.  Hemostasis was achieved with electrocautery.  The flaps were then transposed into place, one clockwise and the other counterclockwise, and anchored with interrupted buried subcutaneous sutures.
Complex Repair And Double M Plasty Text: The defect edges were debeveled with a #15 scalpel blade.  The primary defect was closed partially with a complex linear closure.  Given the location of the remaining defect, shape of the defect and the proximity to free margins a double M plasty was deemed most appropriate for complete closure of the defect.  Using a sterile surgical marker, an appropriate advancement flap was drawn incorporating the defect and placing the expected incisions within the relaxed skin tension lines where possible.    The area thus outlined was incised deep to adipose tissue with a #15 scalpel blade.  The skin margins were undermined to an appropriate distance in all directions utilizing iris scissors.
Epidermal Closure Graft Donor Site (Optional): simple interrupted
Rhombic Flap Text: The defect edges were debeveled with a #15 scalpel blade.  Given the location of the defect and the proximity to free margins a rhombic flap was deemed most appropriate.  Using a sterile surgical marker, an appropriate rhombic flap was drawn incorporating the defect.    The area thus outlined was incised deep to adipose tissue with a #15 scalpel blade.  The skin margins were undermined to an appropriate distance in all directions utilizing iris scissors.
No Repair - Repaired With Adjacent Surgical Defect Text (Leave Blank If You Do Not Want): After the excision the defect was repaired concurrently with another surgical defect which was in close approximation.
Complex Repair And W Plasty Text: The defect edges were debeveled with a #15 scalpel blade.  The primary defect was closed partially with a complex linear closure.  Given the location of the remaining defect, shape of the defect and the proximity to free margins a W plasty was deemed most appropriate for complete closure of the defect.  Using a sterile surgical marker, an appropriate advancement flap was drawn incorporating the defect and placing the expected incisions within the relaxed skin tension lines where possible.    The area thus outlined was incised deep to adipose tissue with a #15 scalpel blade.  The skin margins were undermined to an appropriate distance in all directions utilizing iris scissors.
Skin Substitute Text: The defect edges were debeveled with a #15 scalpel blade.  Given the location of the defect, shape of the defect and the proximity to free margins a skin substitute graft was deemed most appropriate.  The graft material was trimmed to fit the size of the defect. The graft was then placed in the primary defect and oriented appropriately.
Where Do You Want The Question To Include Opioid Counseling Located?: Case Summary Tab
Hemostasis: Electrocautery
S Plasty Text: Given the location and shape of the defect, and the orientation of relaxed skin tension lines, an S-plasty was deemed most appropriate for repair.  Using a sterile surgical marker, the appropriate outline of the S-plasty was drawn, incorporating the defect and placing the expected incisions within the relaxed skin tension lines where possible.  The area thus outlined was incised deep to adipose tissue with a #15 scalpel blade.  The skin margins were undermined to an appropriate distance in all directions utilizing iris scissors. The skin flaps were advanced over the defect.  The opposing margins were then approximated with interrupted buried subcutaneous sutures.
Repair Performed By Another Provider Text (Leave Blank If You Do Not Want): After the tissue was excised the defect was repaired by another provider.
Rhomboid Transposition Flap Text: The defect edges were debeveled with a #15 scalpel blade.  Given the location of the defect and the proximity to free margins a rhomboid transposition flap was deemed most appropriate.  Using a sterile surgical marker, an appropriate rhomboid flap was drawn incorporating the defect.    The area thus outlined was incised deep to adipose tissue with a #15 scalpel blade.  The skin margins were undermined to an appropriate distance in all directions utilizing iris scissors.
Excision Method: Elliptical
Detail Level: Detailed
Helical Rim Advancement Flap Text: The defect edges were debeveled with a #15 blade scalpel.  Given the location of the defect and the proximity to free margins (helical rim) a double helical rim advancement flap was deemed most appropriate.  Using a sterile surgical marker, the appropriate advancement flaps were drawn incorporating the defect and placing the expected incisions between the helical rim and antihelix where possible.  The area thus outlined was incised through and through with a #15 scalpel blade.  With a skin hook and iris scissors, the flaps were gently and sharply undermined and freed up.
Estimated Blood Loss (Cc): minimal
Medical Necessity Clause: This procedure was medically necessary because the lesion that was treated was:
Advancement Flap (Single) Text: The defect edges were debeveled with a #15 scalpel blade.  Given the location of the defect and the proximity to free margins a single advancement flap was deemed most appropriate.  Using a sterile surgical marker, an appropriate advancement flap was drawn incorporating the defect and placing the expected incisions within the relaxed skin tension lines where possible.    The area thus outlined was incised deep to adipose tissue with a #15 scalpel blade.  The skin margins were undermined to an appropriate distance in all directions utilizing iris scissors.
V-Y Plasty Text: The defect edges were debeveled with a #15 scalpel blade.  Given the location of the defect, shape of the defect and the proximity to free margins an V-Y advancement flap was deemed most appropriate.  Using a sterile surgical marker, an appropriate advancement flap was drawn incorporating the defect and placing the expected incisions within the relaxed skin tension lines where possible.    The area thus outlined was incised deep to adipose tissue with a #15 scalpel blade.  The skin margins were undermined to an appropriate distance in all directions utilizing iris scissors.
Information: Selecting Yes will display possible errors in your note based on the variables you have selected. This validation is only offered as a suggestion for you. PLEASE NOTE THAT THE VALIDATION TEXT WILL BE REMOVED WHEN YOU FINALIZE YOUR NOTE. IF YOU WANT TO FAX A PRELIMINARY NOTE YOU WILL NEED TO TOGGLE THIS TO 'NO' IF YOU DO NOT WANT IT IN YOUR FAXED NOTE.
Bi-Rhombic Flap Text: The defect edges were debeveled with a #15 scalpel blade.  Given the location of the defect and the proximity to free margins a bi-rhombic flap was deemed most appropriate.  Using a sterile surgical marker, an appropriate rhombic flap was drawn incorporating the defect. The area thus outlined was incised deep to adipose tissue with a #15 scalpel blade.  The skin margins were undermined to an appropriate distance in all directions utilizing iris scissors.
Complex Repair And Z Plasty Text: The defect edges were debeveled with a #15 scalpel blade.  The primary defect was closed partially with a complex linear closure.  Given the location of the remaining defect, shape of the defect and the proximity to free margins a Z plasty was deemed most appropriate for complete closure of the defect.  Using a sterile surgical marker, an appropriate advancement flap was drawn incorporating the defect and placing the expected incisions within the relaxed skin tension lines where possible.    The area thus outlined was incised deep to adipose tissue with a #15 scalpel blade.  The skin margins were undermined to an appropriate distance in all directions utilizing iris scissors.
Graft Donor Site Bandage (Optional-Leave Blank If You Don't Want In Note): Steri-strips and a pressure bandage were applied to the donor site.
Tissue Cultured Epidermal Autograft Text: The defect edges were debeveled with a #15 scalpel blade.  Given the location of the defect, shape of the defect and the proximity to free margins a tissue cultured epidermal autograft was deemed most appropriate.  The graft was then trimmed to fit the size of the defect.  The graft was then placed in the primary defect and oriented appropriately.
Lab: -9
Advancement Flap (Double) Text: The defect edges were debeveled with a #15 scalpel blade.  Given the location of the defect and the proximity to free margins a double advancement flap was deemed most appropriate.  Using a sterile surgical marker, the appropriate advancement flaps were drawn incorporating the defect and placing the expected incisions within the relaxed skin tension lines where possible.    The area thus outlined was incised deep to adipose tissue with a #15 scalpel blade.  The skin margins were undermined to an appropriate distance in all directions utilizing iris scissors.
W Plasty Text: The lesion was extirpated to the level of the fat with a #15 scalpel blade.  Given the location of the defect, shape of the defect and the proximity to free margins a W-plasty was deemed most appropriate for repair.  Using a sterile surgical marker, the appropriate transposition arms of the W-plasty were drawn incorporating the defect and placing the expected incisions within the relaxed skin tension lines where possible.    The area thus outlined was incised deep to adipose tissue with a #15 scalpel blade.  The skin margins were undermined to an appropriate distance in all directions utilizing iris scissors.  The opposing transposition arms were then transposed into place in opposite direction and anchored with interrupted buried subcutaneous sutures.
Bilateral Helical Rim Advancement Flap Text: The defect edges were debeveled with a #15 blade scalpel.  Given the location of the defect and the proximity to free margins (helical rim) a bilateral helical rim advancement flap was deemed most appropriate.  Using a sterile surgical marker, the appropriate advancement flaps were drawn incorporating the defect and placing the expected incisions between the helical rim and antihelix where possible.  The area thus outlined was incised through and through with a #15 scalpel blade.  With a skin hook and iris scissors, the flaps were gently and sharply undermined and freed up.
Xenograft Text: The defect edges were debeveled with a #15 scalpel blade.  Given the location of the defect, shape of the defect and the proximity to free margins a xenograft was deemed most appropriate.  The graft was then trimmed to fit the size of the defect.  The graft was then placed in the primary defect and oriented appropriately.
H Plasty Text: Given the location of the defect, shape of the defect and the proximity to free margins a H-plasty was deemed most appropriate for repair.  Using a sterile surgical marker, the appropriate advancement arms of the H-plasty were drawn incorporating the defect and placing the expected incisions within the relaxed skin tension lines where possible. The area thus outlined was incised deep to adipose tissue with a #15 scalpel blade. The skin margins were undermined to an appropriate distance in all directions utilizing iris scissors.  The opposing advancement arms were then advanced into place in opposite direction and anchored with interrupted buried subcutaneous sutures.
Billing Type: Third-Party Bill
Complex Repair And Dorsal Nasal Flap Text: The defect edges were debeveled with a #15 scalpel blade.  The primary defect was closed partially with a complex linear closure.  Given the location of the remaining defect, shape of the defect and the proximity to free margins a dorsal nasal flap was deemed most appropriate for complete closure of the defect.  Using a sterile surgical marker, an appropriate flap was drawn incorporating the defect and placing the expected incisions within the relaxed skin tension lines where possible.    The area thus outlined was incised deep to adipose tissue with a #15 scalpel blade.  The skin margins were undermined to an appropriate distance in all directions utilizing iris scissors.
Z Plasty Text: The lesion was extirpated to the level of the fat with a #15 scalpel blade.  Given the location of the defect, shape of the defect and the proximity to free margins a Z-plasty was deemed most appropriate for repair.  Using a sterile surgical marker, the appropriate transposition arms of the Z-plasty were drawn incorporating the defect and placing the expected incisions within the relaxed skin tension lines where possible.    The area thus outlined was incised deep to adipose tissue with a #15 scalpel blade.  The skin margins were undermined to an appropriate distance in all directions utilizing iris scissors.  The opposing transposition arms were then transposed into place in opposite direction and anchored with interrupted buried subcutaneous sutures.
Repair Type: Intermediate
Ear Star Wedge Flap Text: The defect edges were debeveled with a #15 blade scalpel.  Given the location of the defect and the proximity to free margins (helical rim) an ear star wedge flap was deemed most appropriate.  Using a sterile surgical marker, the appropriate flap was drawn incorporating the defect and placing the expected incisions between the helical rim and antihelix where possible.  The area thus outlined was incised through and through with a #15 scalpel blade.
Complex Repair And Split-Thickness Skin Graft Text: The defect edges were debeveled with a #15 scalpel blade.  The primary defect was closed partially with a complex linear closure.  Given the location of the defect, shape of the defect and the proximity to free margins a split thickness skin graft was deemed most appropriate to repair the remaining defect.  The graft was trimmed to fit the size of the remaining defect.  The graft was then placed in the primary defect, oriented appropriately, and sutured into place.
Excision Depth: adipose tissue
Purse String (Simple) Text: Given the location of the defect and the characteristics of the surrounding skin a purse string simple closure was deemed most appropriate.  Undermining was performed circumferentially around the surgical defect.  A purse string suture was then placed and tightened.
Burow's Advancement Flap Text: The defect edges were debeveled with a #15 scalpel blade.  Given the location of the defect and the proximity to free margins a Burow's advancement flap was deemed most appropriate.  Using a sterile surgical marker, the appropriate advancement flap was drawn incorporating the defect and placing the expected incisions within the relaxed skin tension lines where possible.    The area thus outlined was incised deep to adipose tissue with a #15 scalpel blade.  The skin margins were undermined to an appropriate distance in all directions utilizing iris scissors.
Complex Repair And Ftsg Text: The defect edges were debeveled with a #15 scalpel blade.  The primary defect was closed partially with a complex linear closure.  Given the location of the defect, shape of the defect and the proximity to free margins a full thickness skin graft was deemed most appropriate to repair the remaining defect.  The graft was trimmed to fit the size of the remaining defect.  The graft was then placed in the primary defect, oriented appropriately, and sutured into place.
Epidermal Sutures: 4-0 Prolene
Purse String (Intermediate) Text: Given the location of the defect and the characteristics of the surrounding skin a purse string intermediate closure was deemed most appropriate.  Undermining was performed circumferentially around the surgical defect.  A purse string suture was then placed and tightened.
Suture Removal: 14 days
Lab Facility: 3
O-Z Flap Text: The defect edges were debeveled with a #15 scalpel blade.  Given the location of the defect, shape of the defect and the proximity to free margins an O-Z flap was deemed most appropriate.  Using a sterile surgical marker, an appropriate transposition flap was drawn incorporating the defect and placing the expected incisions within the relaxed skin tension lines where possible. The area thus outlined was incised deep to adipose tissue with a #15 scalpel blade.  The skin margins were undermined to an appropriate distance in all directions utilizing iris scissors.
Complex Repair And Single Advancement Flap Text: The defect edges were debeveled with a #15 scalpel blade.  The primary defect was closed partially with a complex linear closure.  Given the location of the remaining defect, shape of the defect and the proximity to free margins a single advancement flap was deemed most appropriate for complete closure of the defect.  Using a sterile surgical marker, an appropriate advancement flap was drawn incorporating the defect and placing the expected incisions within the relaxed skin tension lines where possible.    The area thus outlined was incised deep to adipose tissue with a #15 scalpel blade.  The skin margins were undermined to an appropriate distance in all directions utilizing iris scissors.
Cheek Interpolation Flap Text: A decision was made to reconstruct the defect utilizing an interpolation axial flap and a staged reconstruction.  A telfa template was made of the defect.  This telfa template was then used to outline the Cheek Interpolation flap.  The donor area for the pedicle flap was then injected with anesthesia.  The flap was excised through the skin and subcutaneous tissue down to the layer of the underlying musculature.  The interpolation flap was carefully excised within this deep plane to maintain its blood supply.  The edges of the donor site were undermined.   The donor site was closed in a primary fashion.  The pedicle was then rotated into position and sutured.  Once the tube was sutured into place, adequate blood supply was confirmed with blanching and refill.  The pedicle was then wrapped with xeroform gauze and dressed appropriately with a telfa and gauze bandage to ensure continued blood supply and protect the attached pedicle.
Chonodrocutaneous Helical Advancement Flap Text: The defect edges were debeveled with a #15 scalpel blade.  Given the location of the defect and the proximity to free margins a chondrocutaneous helical advancement flap was deemed most appropriate.  Using a sterile surgical marker, the appropriate advancement flap was drawn incorporating the defect and placing the expected incisions within the relaxed skin tension lines where possible.    The area thus outlined was incised deep to adipose tissue with a #15 scalpel blade.  The skin margins were undermined to an appropriate distance in all directions utilizing iris scissors.
Banner Transposition Flap Text: The defect edges were debeveled with a #15 scalpel blade.  Given the location of the defect and the proximity to free margins a Banner transposition flap was deemed most appropriate.  Using a sterile surgical marker, an appropriate flap drawn around the defect. The area thus outlined was incised deep to adipose tissue with a #15 scalpel blade.  The skin margins were undermined to an appropriate distance in all directions utilizing iris scissors.
Complex Repair And Epidermal Autograft Text: The defect edges were debeveled with a #15 scalpel blade.  The primary defect was closed partially with a complex linear closure.  Given the location of the defect, shape of the defect and the proximity to free margins an epidermal autograft was deemed most appropriate to repair the remaining defect.  The graft was trimmed to fit the size of the remaining defect.  The graft was then placed in the primary defect, oriented appropriately, and sutured into place.

## 2019-12-04 ENCOUNTER — APPOINTMENT (RX ONLY)
Dept: URBAN - METROPOLITAN AREA CLINIC 75 | Facility: CLINIC | Age: 59
Setting detail: DERMATOLOGY
End: 2019-12-04

## 2019-12-04 VITALS — SYSTOLIC BLOOD PRESSURE: 140 MMHG | DIASTOLIC BLOOD PRESSURE: 82 MMHG

## 2019-12-04 PROBLEM — C44.311 BASAL CELL CARCINOMA OF SKIN OF NOSE: Status: ACTIVE | Noted: 2019-12-04

## 2019-12-04 PROCEDURE — ? MOHS SURGERY

## 2019-12-04 PROCEDURE — 17312 MOHS ADDL STAGE: CPT

## 2019-12-04 PROCEDURE — 17311 MOHS 1 STAGE H/N/HF/G: CPT

## 2019-12-04 NOTE — PROCEDURE: MOHS SURGERY
Plastic Surgeon (A): Dr. Emmanuel Sanabria MD at [facility] Plastic Surgery Plastic Surgeon (A): Dr. Emmanuel Sanabria MD at [facility] Plastic Surgery

## 2019-12-05 ENCOUNTER — APPOINTMENT (RX ONLY)
Dept: URBAN - METROPOLITAN AREA CLINIC 75 | Facility: CLINIC | Age: 59
Setting detail: DERMATOLOGY
End: 2019-12-05

## 2019-12-05 DIAGNOSIS — Z48.817 ENCOUNTER FOR SURGICAL AFTERCARE FOLLOWING SURGERY ON THE SKIN AND SUBCUTANEOUS TISSUE: ICD-10-CM

## 2019-12-05 PROCEDURE — 99024 POSTOP FOLLOW-UP VISIT: CPT

## 2019-12-05 PROCEDURE — ? POST-OP WOUND EVALUATION

## 2019-12-05 ASSESSMENT — LOCATION SIMPLE DESCRIPTION DERM: LOCATION SIMPLE: NOSE

## 2019-12-05 ASSESSMENT — LOCATION ZONE DERM: LOCATION ZONE: NOSE

## 2019-12-05 ASSESSMENT — LOCATION DETAILED DESCRIPTION DERM: LOCATION DETAILED: NASAL SUPRATIP

## 2019-12-05 NOTE — PROCEDURE: POST-OP WOUND EVALUATION
Add 03615 Cpt? (Important Note: In 2017 The Use Of 58328 Is Being Tracked By Cms To Determine Future Global Period Reimbursement For Global Periods): yes
Wound Evaluated By (Optional): dr Gutierrez
Detail Level: Zone
Wound Diameter In Cm(Optional): 0

## 2019-12-06 ENCOUNTER — RECORDS - HEALTHEAST (OUTPATIENT)
Dept: LAB | Facility: CLINIC | Age: 59
End: 2019-12-06

## 2019-12-06 LAB
ANION GAP SERPL CALCULATED.3IONS-SCNC: 10 MMOL/L (ref 5–18)
BUN SERPL-MCNC: 14 MG/DL (ref 8–22)
CALCIUM SERPL-MCNC: 10.3 MG/DL (ref 8.5–10.5)
CHLORIDE BLD-SCNC: 105 MMOL/L (ref 98–107)
CO2 SERPL-SCNC: 26 MMOL/L (ref 22–31)
CREAT SERPL-MCNC: 1.3 MG/DL (ref 0.7–1.3)
GFR SERPL CREATININE-BSD FRML MDRD: 56 ML/MIN/1.73M2
GLUCOSE BLD-MCNC: 93 MG/DL (ref 70–125)
POTASSIUM BLD-SCNC: 4.1 MMOL/L (ref 3.5–5)
SODIUM SERPL-SCNC: 141 MMOL/L (ref 136–145)

## 2020-02-03 ENCOUNTER — APPOINTMENT (RX ONLY)
Dept: URBAN - METROPOLITAN AREA CLINIC 75 | Facility: CLINIC | Age: 60
Setting detail: DERMATOLOGY
End: 2020-02-03

## 2020-02-03 DIAGNOSIS — Z48.817 ENCOUNTER FOR SURGICAL AFTERCARE FOLLOWING SURGERY ON THE SKIN AND SUBCUTANEOUS TISSUE: ICD-10-CM

## 2020-02-03 PROCEDURE — ? POST-OP WOUND CHECK

## 2020-02-03 PROCEDURE — 99024 POSTOP FOLLOW-UP VISIT: CPT

## 2020-02-03 ASSESSMENT — LOCATION SIMPLE DESCRIPTION DERM: LOCATION SIMPLE: NOSE

## 2020-02-03 ASSESSMENT — LOCATION ZONE DERM: LOCATION ZONE: NOSE

## 2020-02-03 ASSESSMENT — LOCATION DETAILED DESCRIPTION DERM: LOCATION DETAILED: NASAL TIP

## 2020-02-03 NOTE — PROCEDURE: POST-OP WOUND CHECK
Body Location Override (Optional - Billing Will Still Be Based On Selected Body Map Location If Applicable): nasal Supra tip
Detail Level: Simple
Add 12950 Cpt? (Important Note: In 2017 The Use Of 33632 Is Being Tracked By Cms To Determine Future Global Period Reimbursement For Global Periods): yes
Wound Assessment Override (Optional): depressed scar s/p FTSG by plastic surgery
Wound Evaluated By: Dr. Joshua

## 2020-03-04 ENCOUNTER — RECORDS - HEALTHEAST (OUTPATIENT)
Dept: LAB | Facility: CLINIC | Age: 60
End: 2020-03-04

## 2020-03-04 LAB
ANION GAP SERPL CALCULATED.3IONS-SCNC: 7 MMOL/L (ref 5–18)
BUN SERPL-MCNC: 16 MG/DL (ref 8–22)
CALCIUM SERPL-MCNC: 10.5 MG/DL (ref 8.5–10.5)
CHLORIDE BLD-SCNC: 104 MMOL/L (ref 98–107)
CHOLEST SERPL-MCNC: 195 MG/DL
CO2 SERPL-SCNC: 27 MMOL/L (ref 22–31)
CREAT SERPL-MCNC: 1.23 MG/DL (ref 0.7–1.3)
FASTING STATUS PATIENT QL REPORTED: NORMAL
GFR SERPL CREATININE-BSD FRML MDRD: 60 ML/MIN/1.73M2
GLUCOSE BLD-MCNC: 106 MG/DL (ref 70–125)
HDLC SERPL-MCNC: 44 MG/DL
LDLC SERPL CALC-MCNC: 124 MG/DL
POTASSIUM BLD-SCNC: 4.4 MMOL/L (ref 3.5–5)
SODIUM SERPL-SCNC: 138 MMOL/L (ref 136–145)
TRIGL SERPL-MCNC: 136 MG/DL
TSH SERPL DL<=0.005 MIU/L-ACNC: 4.49 UIU/ML (ref 0.3–5)

## 2020-05-07 ENCOUNTER — APPOINTMENT (RX ONLY)
Dept: URBAN - METROPOLITAN AREA CLINIC 75 | Facility: CLINIC | Age: 60
Setting detail: DERMATOLOGY
End: 2020-05-07

## 2020-05-07 DIAGNOSIS — L90.5 SCAR CONDITIONS AND FIBROSIS OF SKIN: ICD-10-CM

## 2020-05-07 PROCEDURE — ? COUNSELING

## 2020-05-07 PROCEDURE — 99213 OFFICE O/P EST LOW 20 MIN: CPT

## 2020-05-07 PROCEDURE — ? ADDITIONAL NOTES

## 2020-05-07 PROCEDURE — ? FULL BODY SKIN EXAM - DECLINED

## 2020-05-07 ASSESSMENT — LOCATION SIMPLE DESCRIPTION DERM: LOCATION SIMPLE: NOSE

## 2020-05-07 ASSESSMENT — LOCATION DETAILED DESCRIPTION DERM: LOCATION DETAILED: NASAL SUPRATIP

## 2020-05-07 ASSESSMENT — LOCATION ZONE DERM: LOCATION ZONE: NOSE

## 2020-05-07 NOTE — PROCEDURE: ADDITIONAL NOTES
Detail Level: Simple
Additional Notes: Patient is unhappy with scar and would like a scar revision. Initial Mohs surgery performed by Dr. Joshua and repair with a graft performed by Dr. Vu, plastic surgeon. Dr. Vu had recommended a scar revision, however Dr. Vu is no longer practicing in the area. I have sent images to Dr. Joshua for him to review and he would like to have the patient scheduled at our next mohs clinic date for evaluation for scar revision possibly with a transposition flap.
Additional Notes: Patient consent was obtained to proceed with the visit and recommended plan of care after discussion of all risks and benefits, including the risks of COVID-19 exposure.

## 2020-05-07 NOTE — HPI: SCAR
Is This A New Presentation, Or A Follow-Up?: Scar
Additional History: Patient was seeing a plastic surgeon Dr. Vu at Dr. Emmanuel Rice office and she was going to do a scar revision. Patient was informed by the plastic surgeons office that Dr. Vu was no longer there and that Dr. Sanabria does not do facial surgery. He would like a consult on another plastic surgeon that will do a scar revision

## 2020-05-11 ENCOUNTER — APPOINTMENT (RX ONLY)
Dept: URBAN - METROPOLITAN AREA CLINIC 75 | Facility: CLINIC | Age: 60
Setting detail: DERMATOLOGY
End: 2020-05-11

## 2020-05-11 DIAGNOSIS — Z85.828 PERSONAL HISTORY OF OTHER MALIGNANT NEOPLASM OF SKIN: ICD-10-CM

## 2020-05-11 DIAGNOSIS — D18.0 HEMANGIOMA: ICD-10-CM

## 2020-05-11 DIAGNOSIS — L57.0 ACTINIC KERATOSIS: ICD-10-CM

## 2020-05-11 DIAGNOSIS — L82.1 OTHER SEBORRHEIC KERATOSIS: ICD-10-CM

## 2020-05-11 DIAGNOSIS — D22 MELANOCYTIC NEVI: ICD-10-CM

## 2020-05-11 DIAGNOSIS — L57.8 OTHER SKIN CHANGES DUE TO CHRONIC EXPOSURE TO NONIONIZING RADIATION: ICD-10-CM

## 2020-05-11 DIAGNOSIS — L82.0 INFLAMED SEBORRHEIC KERATOSIS: ICD-10-CM

## 2020-05-11 PROBLEM — D18.01 HEMANGIOMA OF SKIN AND SUBCUTANEOUS TISSUE: Status: ACTIVE | Noted: 2020-05-11

## 2020-05-11 PROBLEM — D22.5 MELANOCYTIC NEVI OF TRUNK: Status: ACTIVE | Noted: 2020-05-11

## 2020-05-11 PROCEDURE — ? COUNSELING

## 2020-05-11 PROCEDURE — ? SUNSCREEN RECOMMENDATIONS

## 2020-05-11 PROCEDURE — ? ADDITIONAL NOTES

## 2020-05-11 PROCEDURE — 17000 DESTRUCT PREMALG LESION: CPT | Mod: 59

## 2020-05-11 PROCEDURE — ? FULL BODY SKIN EXAM - DECLINED

## 2020-05-11 PROCEDURE — ? LIQUID NITROGEN

## 2020-05-11 PROCEDURE — 99214 OFFICE O/P EST MOD 30 MIN: CPT | Mod: 25

## 2020-05-11 PROCEDURE — 17110 DESTRUCTION B9 LES UP TO 14: CPT

## 2020-05-11 ASSESSMENT — LOCATION ZONE DERM
LOCATION ZONE: ARM
LOCATION ZONE: FACE
LOCATION ZONE: TRUNK
LOCATION ZONE: NECK

## 2020-05-11 ASSESSMENT — LOCATION SIMPLE DESCRIPTION DERM
LOCATION SIMPLE: CHEST
LOCATION SIMPLE: ABDOMEN
LOCATION SIMPLE: LEFT CHEEK
LOCATION SIMPLE: RIGHT FOREARM
LOCATION SIMPLE: LEFT FOREARM
LOCATION SIMPLE: RIGHT ANTERIOR NECK
LOCATION SIMPLE: LEFT ANTERIOR NECK

## 2020-05-11 ASSESSMENT — LOCATION DETAILED DESCRIPTION DERM
LOCATION DETAILED: LEFT CLAVICULAR NECK
LOCATION DETAILED: RIGHT MEDIAL INFERIOR CHEST
LOCATION DETAILED: LEFT PROXIMAL DORSAL FOREARM
LOCATION DETAILED: EPIGASTRIC SKIN
LOCATION DETAILED: RIGHT PROXIMAL RADIAL DORSAL FOREARM
LOCATION DETAILED: RIGHT CLAVICULAR NECK
LOCATION DETAILED: LEFT CENTRAL MALAR CHEEK
LOCATION DETAILED: LEFT MEDIAL SUPERIOR CHEST
LOCATION DETAILED: PERIUMBILICAL SKIN

## 2020-05-11 NOTE — PROCEDURE: LIQUID NITROGEN
Duration Of Freeze Thaw-Cycle (Seconds): 0
Detail Level: Detailed
Post-Care Instructions: I reviewed with the patient in detail post-care instructions. Patient is to wear sunprotection, and avoid picking at any of the treated lesions. Pt may apply Vaseline to crusted or scabbing areas.
Render Note In Bullet Format When Appropriate: No
Consent: The patient's consent was obtained including but not limited to risks of crusting, scabbing, blistering, scarring, darker or lighter pigmentary change, recurrence, incomplete removal and infection.
Medical Necessity Clause: This procedure was medically necessary because the lesions that were treated were:
Number Of Freeze-Thaw Cycles: 2 freeze-thaw cycles
Medical Necessity Information: It is in your best interest to select a reason for this procedure from the list below. All of these items fulfill various CMS LCD requirements except the new and changing color options.

## 2020-06-11 NOTE — PROCEDURE: MOHS SURGERY
52 Ftsg Text: The defect edges were debeveled with a #15 scalpel blade.  Given the location of the defect, shape of the defect and the proximity to free margins a full thickness skin graft was deemed most appropriate.  Using a sterile surgical marker, the primary defect shape was transferred to the donor site. The area thus outlined was incised deep to adipose tissue with a #15 scalpel blade.  The harvested graft was then trimmed of adipose tissue until only dermis and epidermis was left.  The skin margins of the secondary defect were undermined to an appropriate distance in all directions utilizing iris scissors.  The secondary defect was closed with interrupted buried subcutaneous sutures.  The skin edges were then re-apposed with running  sutures.  The skin graft was then placed in the primary defect and oriented appropriately.

## 2020-06-15 ENCOUNTER — APPOINTMENT (RX ONLY)
Dept: URBAN - METROPOLITAN AREA CLINIC 75 | Facility: CLINIC | Age: 60
Setting detail: DERMATOLOGY
End: 2020-06-15

## 2020-06-15 DIAGNOSIS — L90.5 SCAR CONDITIONS AND FIBROSIS OF SKIN: ICD-10-CM

## 2020-06-15 PROCEDURE — ? ADDITIONAL NOTES

## 2020-06-15 PROCEDURE — 99212 OFFICE O/P EST SF 10 MIN: CPT

## 2020-06-15 NOTE — PROCEDURE: ADDITIONAL NOTES
Additional Notes: We discussed revision by plastic surgery. Patient amenable. Referral placed to plastic surgery. I informed patient that if plastics is not able to revise the scar we would proceed with a transposition flap to cover the atrophic area. Also discussed smoking cessation for at least 2 weeks prior to procedure to allow appropriate healing. Patient expressed understanding.
Detail Level: Simple

## 2020-08-02 ENCOUNTER — VIRTUAL VISIT (OUTPATIENT)
Dept: FAMILY MEDICINE | Facility: OTHER | Age: 60
End: 2020-08-02

## 2020-08-02 NOTE — PROGRESS NOTES
"Date: 2020 13:31:43  Clinician: Mima Quevedo  Clinician NPI: [id number]  Patient: Victoriano Bourgeois  Patient : 1960  Patient Address: [address] [place] [address] , [place] [address]  Patient Phone: [phone]  Visit Protocol: URI  Patient Summary:  Victoriano is a 60 year old ( : 1960 ) male who initiated a Visit for COVID-19 (Coronavirus) evaluation and screening. When asked the question \"Please sign me up to receive news, health information and promotions. \", Victoriano responded \"Yes\".    Victoriano states his symptoms started suddenly 3-4 days ago.   His symptoms consist of nausea, myalgia, malaise, and a headache.   Symptom details   Headache: He states the headache is moderate (4-6 on a 10 point pain scale).    Victoriano denies having wheezing, teeth pain, ageusia, diarrhea, sore throat, anosmia, facial pain or pressure, fever, cough, nasal congestion, vomiting, rhinitis, ear pain, chills, and enlarged lymph nodes. He also denies having recent facial or sinus surgery in the past 60 days, double sickening (worsening symptoms after initial improvement), and taking antibiotic medication in the past month. He is not experiencing dyspnea.   Precipitating events  He has not recently been exposed to someone with influenza. Victoriano has not been in close contact with any high risk individuals.   Pertinent COVID-19 (Coronavirus) information  In the past 14 days, Victoriano has not worked in a congregate living setting.   He does not work or volunteer as healthcare worker or a  and does not work or volunteer in a healthcare facility.   Victoriano also has not lived in a congregate living setting in the past 14 days. He does not live with a healthcare worker.   Victoriano has not had a close contact with a laboratory-confirmed COVID-19 patient within 14 days of symptom onset.   Pertinent medical history  Victoriano does not need a return to work/school note.   Weight: 217 lbs   Victoriano does not smoke or use smokeless tobacco.   " "Weight: 217 lbs    MEDICATIONS: levothyroxine oral, triamterene-hydrochlorothiazide oral, losartan oral, rosuvastatin oral, ALLERGIES: Tylenol-Codeine #3  Clinician Response:  Dear Victoriano,   Your symptoms show that you may have coronavirus (COVID-19). This illness can cause fever, cough and trouble breathing. Many people get a mild case and get better on their own. Some people can get very sick.  What should I do?  We would like to test you for this virus.   1. Please call [phone] to schedule your visit. Explain that you were referred by [facility] to have a COVID-19 test. Be ready [facility].  The following will serve as your written order for this COVID Test, ordered by me, for the indication of suspected COVID [Z20.828]: The test will be ordered in [employer], our electronic health record, after you are scheduled. It will show as ordered and authorized by Emmett Kruse MD.  Order: COVID-19 (Coronavirus) PCR for SYMPTOMATIC testing from [facility].      2. When it's time for your COVID test:  Stay at least 6 feet away from others. (If someone will drive you to your test, stay in the backseat, as far away from the  as you can.)   Cover your mouth and nose with a mask, tissue or washcloth.  Go straight to the testing site. Don't make any stops on the way there or back.      3.Starting now: Stay home and away from others (self-isolate) until:   You've had no fever---and no medicine that reduces fever---for 3 full days (72 hours). And...   Your other symptoms have gotten better. For example, your cough or breathing has improved. And...   At least 10 days have passed since your symptoms started.       During this time, don't leave the house except for testing or medical care.   Stay in your own room, even for meals. Use your own bathroom if you can.   Stay away from others in your home. No hugging, kissing or shaking hands. No visitors.  Don't go to work, school or anywhere else.    Clean \"high " "touch\" surfaces often (doorknobs, counters, handles, etc.). Use a household cleaning spray or wipes. You'll find a full list of  on the EPA website: www.epa.gov/pesticide-registration/list-n-disinfectants-use-against-sars-cov-2.   Cover your mouth and nose with a mask, tissue or washcloth to avoid spreading germs.  Wash your hands and face often. Use soap and water.  Caregivers in these groups are at risk for severe illness due to COVID-19:  o People 65 years and older  o People who live in a nursing home or long-term care facility  o People with chronic disease (lung, heart, cancer, diabetes, kidney, liver, immunologic)  o People who have a weakened immune system, including those who:   Are in cancer treatment  Take medicine that weakens the immune system, such as corticosteroids  Had a bone marrow or organ transplant  Have an immune deficiency  Have poorly controlled HIV or AIDS  Are obese (body mass index of 40 or higher)  Smoke regularly   o Caregivers should wear gloves while washing dishes, handling laundry and cleaning bedrooms and bathrooms.  o Use caution when washing and drying laundry: Don't shake dirty laundry, and use the warmest water setting that you can.  o For more tips, go to www.cdc.gov/coronavirus/2019-ncov/downloads/10Things.pdf.    4.Sign up for [employer]. We know it's scary to hear that you might have COVID-19. We want to track your symptoms to make sure you're okay over the next 2 weeks. Please look for an email from [employer]---this is a free, online program that we'll use to keep in touch. To sign up, follow the link in the email. Learn more at [email or web address]  How can I take care of myself?   Get lots of rest. Drink extra fluids (unless a doctor has told you not to).   Take Tylenol (acetaminophen) for fever or pain. If you have liver or kidney problems, ask your family doctor if it's okay to take Tylenol.   Adults can take either:    650 mg (two 325 mg " pills) every 4 to 6 hours, or...   1,000 mg (two 500 mg pills) every 8 hours as needed.    Note: Don't take more than 3,000 mg in one day. Acetaminophen is found in many medicines (both prescribed and over-the-counter medicines). Read all labels to be sure you don't take too much.   For children, check the Tylenol bottle for the right dose. The dose is based on the child's age or weight.    If you have other health problems (like cancer, heart failure, an organ transplant or severe kidney disease): Call your specialty clinic if you don't feel better in the next 2 days.       Know when to call 911. Emergency warning signs include:    Trouble breathing or shortness of breath Pain or pressure in the chest that doesn't go away Feeling confused like you haven't felt before, or not being able to wake up Bluish-colored lips or face.  Where can I get more information?   [employer] -- About COVID-19: [email or web address]   CDC -- What to Do If You're Sick: www.cdc.gov/coronavirus/2019-ncov/about/steps-when-sick.html   CDC -- Ending Home Isolation: www.cdc.gov/coronavirus/2019-ncov/hcp/disposition-in-home-patients.html   CDC -- Caring for Someone: www.cdc.gov/coronavirus/2019-ncov/if-you-are-sick/care-for-someone.html   [facility] -- Interim Guidance for Hospital Discharge to Home: www.health.[facility].[place]/diseases/coronavirus/hcp/hospdischarge.pdf   [facility] clinical trials (COVID-19 research studies): clinicalaffairs.[facility]-clinical-trials    Below are the COVID-19 hotlines at the [place] Department of Health ([facility]). Interpreters are available.    For health questions: Call 279-606-2133 or 1-833.566.5610 (7 a.m. to 7 p.m.) For questions about schools and childcare: Call 955-968-5904 or 1-963.999.3831 (7 a.m. to 7 p.m.)    Diagnosis: Other malaise  Diagnosis ICD: R53.81

## 2020-08-03 ENCOUNTER — AMBULATORY - HEALTHEAST (OUTPATIENT)
Dept: FAMILY MEDICINE | Facility: CLINIC | Age: 60
End: 2020-08-03

## 2020-08-03 DIAGNOSIS — Z20.822 SUSPECTED COVID-19 VIRUS INFECTION: ICD-10-CM

## 2020-08-05 ENCOUNTER — COMMUNICATION - HEALTHEAST (OUTPATIENT)
Dept: SCHEDULING | Facility: CLINIC | Age: 60
End: 2020-08-05

## 2020-08-12 ENCOUNTER — RECORDS - HEALTHEAST (OUTPATIENT)
Dept: LAB | Facility: CLINIC | Age: 60
End: 2020-08-12

## 2020-08-12 LAB
ALBUMIN SERPL-MCNC: 4.1 G/DL (ref 3.5–5)
ALP SERPL-CCNC: 39 U/L (ref 45–120)
ALT SERPL W P-5'-P-CCNC: 42 U/L (ref 0–45)
ANION GAP SERPL CALCULATED.3IONS-SCNC: 7 MMOL/L (ref 5–18)
AST SERPL W P-5'-P-CCNC: 27 U/L (ref 0–40)
BILIRUB SERPL-MCNC: 0.2 MG/DL (ref 0–1)
BUN SERPL-MCNC: 20 MG/DL (ref 8–22)
CALCIUM SERPL-MCNC: 10.7 MG/DL (ref 8.5–10.5)
CHLORIDE BLD-SCNC: 106 MMOL/L (ref 98–107)
CHOLEST SERPL-MCNC: 220 MG/DL
CO2 SERPL-SCNC: 27 MMOL/L (ref 22–31)
CREAT SERPL-MCNC: 1.25 MG/DL (ref 0.7–1.3)
FASTING STATUS PATIENT QL REPORTED: ABNORMAL
GFR SERPL CREATININE-BSD FRML MDRD: 59 ML/MIN/1.73M2
GLUCOSE BLD-MCNC: 118 MG/DL (ref 70–125)
HDLC SERPL-MCNC: 36 MG/DL
LDLC SERPL CALC-MCNC: 126 MG/DL
POTASSIUM BLD-SCNC: 4.7 MMOL/L (ref 3.5–5)
PROT SERPL-MCNC: 6.9 G/DL (ref 6–8)
SODIUM SERPL-SCNC: 140 MMOL/L (ref 136–145)
TRIGL SERPL-MCNC: 292 MG/DL
TSH SERPL DL<=0.005 MIU/L-ACNC: 2.52 UIU/ML (ref 0.3–5)
VIT B12 SERPL-MCNC: 337 PG/ML (ref 213–816)

## 2020-11-29 ENCOUNTER — HEALTH MAINTENANCE LETTER (OUTPATIENT)
Age: 60
End: 2020-11-29

## 2020-11-30 ENCOUNTER — RECORDS - HEALTHEAST (OUTPATIENT)
Dept: LAB | Facility: CLINIC | Age: 60
End: 2020-11-30

## 2020-11-30 LAB
ALBUMIN SERPL-MCNC: 4.4 G/DL (ref 3.5–5)
ALP SERPL-CCNC: 31 U/L (ref 45–120)
ALT SERPL W P-5'-P-CCNC: 35 U/L (ref 0–45)
ANION GAP SERPL CALCULATED.3IONS-SCNC: 10 MMOL/L (ref 5–18)
AST SERPL W P-5'-P-CCNC: 30 U/L (ref 0–40)
BILIRUB SERPL-MCNC: 0.6 MG/DL (ref 0–1)
BUN SERPL-MCNC: 17 MG/DL (ref 8–22)
CALCIUM SERPL-MCNC: 10.6 MG/DL (ref 8.5–10.5)
CHLORIDE BLD-SCNC: 101 MMOL/L (ref 98–107)
CO2 SERPL-SCNC: 26 MMOL/L (ref 22–31)
CREAT SERPL-MCNC: 1.32 MG/DL (ref 0.7–1.3)
FERRITIN SERPL-MCNC: 462 NG/ML (ref 27–300)
GFR SERPL CREATININE-BSD FRML MDRD: 55 ML/MIN/1.73M2
GLUCOSE BLD-MCNC: 106 MG/DL (ref 70–125)
IRON SATN MFR SERPL: 34 % (ref 20–50)
IRON SERPL-MCNC: 94 UG/DL (ref 42–175)
POTASSIUM BLD-SCNC: 4 MMOL/L (ref 3.5–5)
PROT SERPL-MCNC: 7.2 G/DL (ref 6–8)
PSA SERPL-MCNC: 0.3 NG/ML (ref 0–4.5)
PTH-INTACT SERPL-MCNC: 92 PG/ML (ref 10–86)
SODIUM SERPL-SCNC: 137 MMOL/L (ref 136–145)
TIBC SERPL-MCNC: 277 UG/DL (ref 313–563)
TRANSFERRIN SERPL-MCNC: 221 MG/DL (ref 212–360)

## 2020-12-01 LAB — 25(OH)D3 SERPL-MCNC: 13.2 NG/ML (ref 30–80)

## 2021-01-27 ENCOUNTER — RECORDS - HEALTHEAST (OUTPATIENT)
Dept: LAB | Facility: CLINIC | Age: 61
End: 2021-01-27

## 2021-01-27 LAB
ALBUMIN SERPL-MCNC: 4.1 G/DL (ref 3.5–5)
ALP SERPL-CCNC: 29 U/L (ref 45–120)
ALT SERPL W P-5'-P-CCNC: 40 U/L (ref 0–45)
ANION GAP SERPL CALCULATED.3IONS-SCNC: 7 MMOL/L (ref 5–18)
AST SERPL W P-5'-P-CCNC: 25 U/L (ref 0–40)
BASOPHILS # BLD AUTO: 0 THOU/UL (ref 0–0.2)
BASOPHILS NFR BLD AUTO: 1 % (ref 0–2)
BILIRUB SERPL-MCNC: 0.5 MG/DL (ref 0–1)
BUN SERPL-MCNC: 16 MG/DL (ref 8–22)
CALCIUM SERPL-MCNC: 10 MG/DL (ref 8.5–10.5)
CHLORIDE BLD-SCNC: 105 MMOL/L (ref 98–107)
CO2 SERPL-SCNC: 28 MMOL/L (ref 22–31)
CREAT SERPL-MCNC: 1.18 MG/DL (ref 0.7–1.3)
EOSINOPHIL # BLD AUTO: 0.1 THOU/UL (ref 0–0.4)
EOSINOPHIL NFR BLD AUTO: 2 % (ref 0–6)
ERYTHROCYTE [DISTWIDTH] IN BLOOD BY AUTOMATED COUNT: 12.8 % (ref 11–14.5)
GFR SERPL CREATININE-BSD FRML MDRD: >60 ML/MIN/1.73M2
GLUCOSE BLD-MCNC: 117 MG/DL (ref 70–125)
HCT VFR BLD AUTO: 42.4 % (ref 40–54)
HGB BLD-MCNC: 14.2 G/DL (ref 14–18)
IMM GRANULOCYTES # BLD: 0 THOU/UL
IMM GRANULOCYTES NFR BLD: 0 %
LYMPHOCYTES # BLD AUTO: 1.8 THOU/UL (ref 0.8–4.4)
LYMPHOCYTES NFR BLD AUTO: 30 % (ref 20–40)
MCH RBC QN AUTO: 30.9 PG (ref 27–34)
MCHC RBC AUTO-ENTMCNC: 33.5 G/DL (ref 32–36)
MCV RBC AUTO: 92 FL (ref 80–100)
MONOCYTES # BLD AUTO: 0.5 THOU/UL (ref 0–0.9)
MONOCYTES NFR BLD AUTO: 8 % (ref 2–10)
NEUTROPHILS # BLD AUTO: 3.4 THOU/UL (ref 2–7.7)
NEUTROPHILS NFR BLD AUTO: 59 % (ref 50–70)
PLATELET # BLD AUTO: 201 THOU/UL (ref 140–440)
PMV BLD AUTO: 10.6 FL (ref 8.5–12.5)
POTASSIUM BLD-SCNC: 4.7 MMOL/L (ref 3.5–5)
PROT SERPL-MCNC: 6.6 G/DL (ref 6–8)
PTH-INTACT SERPL-MCNC: 132 PG/ML (ref 10–86)
RBC # BLD AUTO: 4.59 MILL/UL (ref 4.4–6.2)
SODIUM SERPL-SCNC: 140 MMOL/L (ref 136–145)
WBC: 5.8 THOU/UL (ref 4–11)

## 2021-01-28 LAB — 25(OH)D3 SERPL-MCNC: 39.3 NG/ML (ref 30–80)

## 2021-09-19 ENCOUNTER — HEALTH MAINTENANCE LETTER (OUTPATIENT)
Age: 61
End: 2021-09-19

## 2021-12-15 ENCOUNTER — LAB REQUISITION (OUTPATIENT)
Dept: LAB | Facility: CLINIC | Age: 61
End: 2021-12-15
Payer: COMMERCIAL

## 2021-12-15 DIAGNOSIS — E03.9 HYPOTHYROIDISM, UNSPECIFIED: ICD-10-CM

## 2021-12-15 DIAGNOSIS — Z12.5 ENCOUNTER FOR SCREENING FOR MALIGNANT NEOPLASM OF PROSTATE: ICD-10-CM

## 2021-12-15 DIAGNOSIS — E78.2 MIXED HYPERLIPIDEMIA: ICD-10-CM

## 2021-12-15 DIAGNOSIS — I25.10 ATHEROSCLEROTIC HEART DISEASE OF NATIVE CORONARY ARTERY WITHOUT ANGINA PECTORIS: ICD-10-CM

## 2021-12-15 LAB
ANION GAP SERPL CALCULATED.3IONS-SCNC: 10 MMOL/L (ref 5–18)
BUN SERPL-MCNC: 15 MG/DL (ref 8–22)
CALCIUM SERPL-MCNC: 10.2 MG/DL (ref 8.5–10.5)
CHLORIDE BLD-SCNC: 102 MMOL/L (ref 98–107)
CHOLEST SERPL-MCNC: 169 MG/DL
CO2 SERPL-SCNC: 26 MMOL/L (ref 22–31)
CREAT SERPL-MCNC: 1.27 MG/DL (ref 0.7–1.3)
GFR SERPL CREATININE-BSD FRML MDRD: 60 ML/MIN/1.73M2
GLUCOSE BLD-MCNC: 100 MG/DL (ref 70–125)
HDLC SERPL-MCNC: 49 MG/DL
LDLC SERPL CALC-MCNC: 97 MG/DL
POTASSIUM BLD-SCNC: 4.1 MMOL/L (ref 3.5–5)
PSA SERPL-MCNC: 0.27 UG/L (ref 0–4.5)
SODIUM SERPL-SCNC: 138 MMOL/L (ref 136–145)
TRIGL SERPL-MCNC: 114 MG/DL
TSH SERPL DL<=0.005 MIU/L-ACNC: 4.34 UIU/ML (ref 0.3–5)

## 2021-12-15 PROCEDURE — G0103 PSA SCREENING: HCPCS | Mod: ORL | Performed by: PHYSICIAN ASSISTANT

## 2021-12-15 PROCEDURE — 80061 LIPID PANEL: CPT | Mod: ORL | Performed by: PHYSICIAN ASSISTANT

## 2021-12-15 PROCEDURE — 80048 BASIC METABOLIC PNL TOTAL CA: CPT | Mod: ORL | Performed by: PHYSICIAN ASSISTANT

## 2021-12-15 PROCEDURE — 84443 ASSAY THYROID STIM HORMONE: CPT | Mod: ORL | Performed by: PHYSICIAN ASSISTANT

## 2022-01-09 ENCOUNTER — HEALTH MAINTENANCE LETTER (OUTPATIENT)
Age: 62
End: 2022-01-09

## 2022-11-21 ENCOUNTER — HEALTH MAINTENANCE LETTER (OUTPATIENT)
Age: 62
End: 2022-11-21

## 2023-03-20 ENCOUNTER — LAB REQUISITION (OUTPATIENT)
Dept: LAB | Facility: CLINIC | Age: 63
End: 2023-03-20
Payer: COMMERCIAL

## 2023-03-20 DIAGNOSIS — Z12.5 ENCOUNTER FOR SCREENING FOR MALIGNANT NEOPLASM OF PROSTATE: ICD-10-CM

## 2023-03-20 DIAGNOSIS — E03.9 HYPOTHYROIDISM, UNSPECIFIED: ICD-10-CM

## 2023-03-20 DIAGNOSIS — R73.9 HYPERGLYCEMIA, UNSPECIFIED: ICD-10-CM

## 2023-03-20 DIAGNOSIS — E89.2 POSTPROCEDURAL HYPOPARATHYROIDISM (H): ICD-10-CM

## 2023-03-20 LAB
ALBUMIN SERPL BCG-MCNC: 4.5 G/DL (ref 3.5–5.2)
ALP SERPL-CCNC: 34 U/L (ref 40–129)
ALT SERPL W P-5'-P-CCNC: 31 U/L (ref 10–50)
ANION GAP SERPL CALCULATED.3IONS-SCNC: 11 MMOL/L (ref 7–15)
AST SERPL W P-5'-P-CCNC: 33 U/L (ref 10–50)
BILIRUB SERPL-MCNC: 0.4 MG/DL
BUN SERPL-MCNC: 15.9 MG/DL (ref 8–23)
CALCIUM SERPL-MCNC: 9.4 MG/DL (ref 8.8–10.2)
CHLORIDE SERPL-SCNC: 104 MMOL/L (ref 98–107)
CREAT SERPL-MCNC: 1.25 MG/DL (ref 0.67–1.17)
DEPRECATED HCO3 PLAS-SCNC: 25 MMOL/L (ref 22–29)
FERRITIN SERPL-MCNC: 314 NG/ML (ref 31–409)
GFR SERPL CREATININE-BSD FRML MDRD: 65 ML/MIN/1.73M2
GLUCOSE SERPL-MCNC: 112 MG/DL (ref 70–99)
HBA1C MFR BLD: 6.1 %
IRON BINDING CAPACITY (ROCHE): 285 UG/DL (ref 240–430)
IRON SATN MFR SERPL: 26 % (ref 15–46)
IRON SERPL-MCNC: 75 UG/DL (ref 61–157)
POTASSIUM SERPL-SCNC: 4.3 MMOL/L (ref 3.4–5.3)
PROT SERPL-MCNC: 7 G/DL (ref 6.4–8.3)
PSA SERPL DL<=0.01 NG/ML-MCNC: 0.22 NG/ML (ref 0–4.5)
PTH-INTACT SERPL-MCNC: 44 PG/ML (ref 15–65)
SODIUM SERPL-SCNC: 140 MMOL/L (ref 136–145)
T4 FREE SERPL-MCNC: 1.32 NG/DL (ref 0.9–1.7)
TSH SERPL DL<=0.005 MIU/L-ACNC: 5.88 UIU/ML (ref 0.3–4.2)

## 2023-03-20 PROCEDURE — 83550 IRON BINDING TEST: CPT | Mod: ORL | Performed by: PHYSICIAN ASSISTANT

## 2023-03-20 PROCEDURE — G0103 PSA SCREENING: HCPCS | Mod: ORL | Performed by: PHYSICIAN ASSISTANT

## 2023-03-20 PROCEDURE — 82306 VITAMIN D 25 HYDROXY: CPT | Mod: ORL | Performed by: PHYSICIAN ASSISTANT

## 2023-03-20 PROCEDURE — 84439 ASSAY OF FREE THYROXINE: CPT | Mod: ORL | Performed by: PHYSICIAN ASSISTANT

## 2023-03-20 PROCEDURE — 84443 ASSAY THYROID STIM HORMONE: CPT | Mod: ORL | Performed by: PHYSICIAN ASSISTANT

## 2023-03-20 PROCEDURE — 80053 COMPREHEN METABOLIC PANEL: CPT | Mod: ORL | Performed by: PHYSICIAN ASSISTANT

## 2023-03-20 PROCEDURE — 82728 ASSAY OF FERRITIN: CPT | Mod: ORL | Performed by: PHYSICIAN ASSISTANT

## 2023-03-20 PROCEDURE — 83036 HEMOGLOBIN GLYCOSYLATED A1C: CPT | Mod: ORL | Performed by: PHYSICIAN ASSISTANT

## 2023-03-20 PROCEDURE — 83970 ASSAY OF PARATHORMONE: CPT | Mod: ORL | Performed by: PHYSICIAN ASSISTANT

## 2023-03-21 LAB — DEPRECATED CALCIDIOL+CALCIFEROL SERPL-MC: 21 UG/L (ref 20–75)

## 2023-04-16 ENCOUNTER — HEALTH MAINTENANCE LETTER (OUTPATIENT)
Age: 63
End: 2023-04-16

## 2024-02-04 ENCOUNTER — HOSPITAL ENCOUNTER (EMERGENCY)
Facility: CLINIC | Age: 64
Discharge: HOME | End: 2024-02-04
Attending: EMERGENCY MEDICINE | Admitting: EMERGENCY MEDICINE
Payer: COMMERCIAL

## 2024-02-04 ENCOUNTER — APPOINTMENT (OUTPATIENT)
Dept: ULTRASOUND IMAGING | Facility: CLINIC | Age: 64
End: 2024-02-04
Attending: EMERGENCY MEDICINE
Payer: COMMERCIAL

## 2024-02-04 VITALS
TEMPERATURE: 97.8 F | DIASTOLIC BLOOD PRESSURE: 90 MMHG | OXYGEN SATURATION: 95 % | RESPIRATION RATE: 20 BRPM | SYSTOLIC BLOOD PRESSURE: 151 MMHG | HEART RATE: 89 BPM

## 2024-02-04 DIAGNOSIS — K80.20 CALCULUS OF GALLBLADDER WITHOUT CHOLECYSTITIS WITHOUT OBSTRUCTION: ICD-10-CM

## 2024-02-04 DIAGNOSIS — R79.89 ELEVATED LFTS: ICD-10-CM

## 2024-02-04 DIAGNOSIS — K76.0 HEPATIC STEATOSIS: ICD-10-CM

## 2024-02-04 DIAGNOSIS — R10.10 UPPER ABDOMINAL PAIN: ICD-10-CM

## 2024-02-04 LAB
ALBUMIN SERPL BCG-MCNC: 4.3 G/DL (ref 3.5–5.2)
ALP SERPL-CCNC: 67 U/L (ref 40–150)
ALT SERPL W P-5'-P-CCNC: 391 U/L (ref 0–70)
ANION GAP SERPL CALCULATED.3IONS-SCNC: 14 MMOL/L (ref 7–15)
AST SERPL W P-5'-P-CCNC: 139 U/L (ref 0–45)
BASOPHILS # BLD AUTO: 0 10E3/UL (ref 0–0.2)
BASOPHILS NFR BLD AUTO: 0 %
BILIRUB SERPL-MCNC: 1.3 MG/DL
BUN SERPL-MCNC: 18.9 MG/DL (ref 8–23)
CALCIUM SERPL-MCNC: 8.9 MG/DL (ref 8.8–10.2)
CHLORIDE SERPL-SCNC: 100 MMOL/L (ref 98–107)
CREAT SERPL-MCNC: 1.32 MG/DL (ref 0.67–1.17)
DEPRECATED HCO3 PLAS-SCNC: 22 MMOL/L (ref 22–29)
EGFRCR SERPLBLD CKD-EPI 2021: 60 ML/MIN/1.73M2
EOSINOPHIL # BLD AUTO: 0 10E3/UL (ref 0–0.7)
EOSINOPHIL NFR BLD AUTO: 0 %
ERYTHROCYTE [DISTWIDTH] IN BLOOD BY AUTOMATED COUNT: 12.8 % (ref 10–15)
GLUCOSE SERPL-MCNC: 110 MG/DL (ref 70–99)
HCT VFR BLD AUTO: 44.7 % (ref 40–53)
HGB BLD-MCNC: 15.2 G/DL (ref 13.3–17.7)
HOLD SPECIMEN: NORMAL
IMM GRANULOCYTES # BLD: 0 10E3/UL
IMM GRANULOCYTES NFR BLD: 0 %
LIPASE SERPL-CCNC: 18 U/L (ref 13–60)
LYMPHOCYTES # BLD AUTO: 0.4 10E3/UL (ref 0.8–5.3)
LYMPHOCYTES NFR BLD AUTO: 5 %
MCH RBC QN AUTO: 30.2 PG (ref 26.5–33)
MCHC RBC AUTO-ENTMCNC: 34 G/DL (ref 31.5–36.5)
MCV RBC AUTO: 89 FL (ref 78–100)
MONOCYTES # BLD AUTO: 0.6 10E3/UL (ref 0–1.3)
MONOCYTES NFR BLD AUTO: 7 %
NEUTROPHILS # BLD AUTO: 6.6 10E3/UL (ref 1.6–8.3)
NEUTROPHILS NFR BLD AUTO: 88 %
NRBC # BLD AUTO: 0 10E3/UL
NRBC BLD AUTO-RTO: 0 /100
PLATELET # BLD AUTO: 164 10E3/UL (ref 150–450)
POTASSIUM SERPL-SCNC: 3.9 MMOL/L (ref 3.4–5.3)
PROT SERPL-MCNC: 7.1 G/DL (ref 6.4–8.3)
RBC # BLD AUTO: 5.03 10E6/UL (ref 4.4–5.9)
SODIUM SERPL-SCNC: 136 MMOL/L (ref 135–145)
WBC # BLD AUTO: 7.7 10E3/UL (ref 4–11)

## 2024-02-04 PROCEDURE — 76705 ECHO EXAM OF ABDOMEN: CPT

## 2024-02-04 PROCEDURE — 258N000003 HC RX IP 258 OP 636: Performed by: EMERGENCY MEDICINE

## 2024-02-04 PROCEDURE — 36415 COLL VENOUS BLD VENIPUNCTURE: CPT | Performed by: EMERGENCY MEDICINE

## 2024-02-04 PROCEDURE — 99285 EMERGENCY DEPT VISIT HI MDM: CPT | Mod: 25

## 2024-02-04 PROCEDURE — 85004 AUTOMATED DIFF WBC COUNT: CPT | Performed by: EMERGENCY MEDICINE

## 2024-02-04 PROCEDURE — 93005 ELECTROCARDIOGRAM TRACING: CPT

## 2024-02-04 PROCEDURE — 96360 HYDRATION IV INFUSION INIT: CPT

## 2024-02-04 PROCEDURE — 83690 ASSAY OF LIPASE: CPT | Performed by: EMERGENCY MEDICINE

## 2024-02-04 PROCEDURE — 80053 COMPREHEN METABOLIC PANEL: CPT | Performed by: EMERGENCY MEDICINE

## 2024-02-04 RX ADMIN — SODIUM CHLORIDE 1000 ML: 9 INJECTION, SOLUTION INTRAVENOUS at 10:42

## 2024-02-04 ASSESSMENT — ACTIVITIES OF DAILY LIVING (ADL)
ADLS_ACUITY_SCORE: 35
ADLS_ACUITY_SCORE: 35

## 2024-02-04 NOTE — ED TRIAGE NOTES
Developed epigastric pain on Thursday. Resolved, then happened again Friday. Patient states worse after eating. Denies N/V/D.

## 2024-02-04 NOTE — ED PROVIDER NOTES
History     Chief Complaint:  Abdominal Pain       HPI   Victoriano Bourgeois is a 63 year old male who presents emergency department upper abdominal pain that came approximately 1 hour after eating a large meal 3 days ago.  He notes the pain persisted into the next day but has since lessened and is now minimal to none.  He also has not eaten because he feels that eating especially large amounts, causes the pain.  He notes he had 1 similar episode months ago that resolved on its own.  He denies any history of peptic ulcer disease or gastritis.  He occasionally uses alcohol but not regularly.  He denies nausea or vomiting.  Denies stool changes or urine changes.  Denies chest pain or shortness of breath.  He denies fever or chills.  He is not a smoker.      Independent Historian:   None - Patient Only    Review of External Notes:   Outpatient clinic notes reviewed.  Cardiology note from 1/30/2024 reviewed.  Patient was seen at that time for follow-up of atherosclerotic cardiovascular disease.  No other recent notes available for review.      Medications:    aspirin (ECOTRIN) 81 mg enteric coated tablet  Take 81 mg by mouth once daily with a meal.   0     Active   levothyroxine (SYNTHROID) 50 mcg tablet  TK 1 T PO D   0 02/12/2020   Active   cetirizine (ZyrTEC) 10 mg tablet  Take 1 Tablet (10 mg) by mouth once daily.   0 05/03/2021   Active   cholecalciferol (VITAMIN D3) 1,000 unit tablet  Take by mouth. Historical med   0 05/04/2021   Active   nitroglycerin (NITROSTAT) 0.4 mg sublingual tablet   Indications: ASCVD (arteriosclerotic cardiovascular disease) Place 1 Tablet (0.4 mg) under the tongue every 5 minutes if needed for Chest Pain. 25 Tablet  0 11/07/2023   Active   dilTIAZem CR (TIAZAC; TAZTIA XT) 120 mg capsule   Indications: Essential hypertension Take 2 Capsules (240 mg) by mouth once daily. 180 Capsule  0 12/19/2023   Active   evolocumab (Repatha SureClick) 140 mg/mL subcutaneous pen injector   Indications:  ASCVD (arteriosclerotic cardiovascular disease) Inject 1 mL (140 mg) subcutaneous every 2 weeks. Inject into abdomen, thigh, or upper arm; rotate injection sites. 6 mL  3 01/30/2024   Active   losartan (COZAAR) 100 mg tablet   Indications: Essential hypertension Take 1 Tablet (100 mg) by mouth once daily. 90 Tablet  3 01/30/2024   Active   losartan (COZAAR) 100 mg tablet   Indications: Essential hypertension Take 1 Tablet (100 mg) by mouth once daily. 30 Tablet  0 01/09/2024   Active   evolocumab (Repatha SureClick) 140 mg/mL subcutaneous pen injector   Indications: Hyperlipidemia, unspecified hyperlipidemia type Inject 1 mL (140 mg) subcutaneous every 2 weeks. Inject into abdomen, thigh, or upper arm; rotate injection sites. 3 mL  0 01/12/2024   Active   dilTIAZem CR (TIAZAC; TAZTIA XT) 240 mg capsule   Indications: Essential hypertension Take 1 Capsule (240 mg) by mouth once daily. 90 Capsule  3 01/30/2024   Active       Past Medical History:    CAD in native artery 04/09/2019     Overview:    Formatting of this note might be different from the original.  inferolateral STEMI S/P thrombectomy and overlapping ANSELMO x 2 to mid LCX extending into OM3 3/22/19, EF 55%     STEMI involving left circumflex coronary artery       Essential hypertension       Dyslipidemia       Hyperparathyroidism       Hypothyroid       Vitamin D deficiency         Past Surgical History:    CORONARY ANGIOPLASTY WITH STENT PLACEMENT      Physical Exam   Patient Vitals for the past 24 hrs:   BP Temp Temp src Pulse Resp SpO2   02/04/24 1250 (!) 151/90 -- -- 89 -- 95 %   02/04/24 1015 (!) 152/105 97.8  F (36.6  C) Temporal 116 20 98 %        Physical Exam  General: Adult male sitting upright  Eyes: PERRL, Conjunctive within normal limits.  No scleral icterus.  ENT: Moist mucous membranes, oropharynx clear.   CV: Normal S1S2, no murmur, rub or gallop. Regular rate and rhythm  Resp: Clear to auscultation bilaterally, no wheezes, rales or rhonchi.  Normal respiratory effort.  GI: Abdomen is soft and nondistended.  Epigastric and right upper quadrant tenderness to palpation.  No palpable masses. No rebound or guarding.  MSK: No edema. Nontender. Normal active range of motion.  Skin: Warm and dry. No rashes or lesions or ecchymoses on visible skin.  Neuro: Alert and oriented. Responds appropriately to all questions and commands. No focal findings appreciated. Normal muscle tone.  Psych: Normal mood and affect. Pleasant.     Emergency Department Course       ECG results from 02/04/24   EKG 12-lead, tracing only     Value    Systolic Blood Pressure     Diastolic Blood Pressure     Ventricular Rate 98    Atrial Rate 98    AL Interval 164    QRS Duration 146        QTc 469    P Axis 17    R AXIS 103    T Axis -5    Interpretation ECG      Sinus rhythm  Possible Left atrial enlargement  Right bundle branch block  Abnormal ECG  No previous ECGs available         Imaging:  US Abdomen Limited   Final Result   IMPRESSION:   1.  Cholelithiasis and gallbladder sludge. No cholecystitis nor bile duct dilatation.   2.  Diffuse hepatic steatosis.                   Laboratory:  Labs Ordered and Resulted from Time of ED Arrival to Time of ED Departure   COMPREHENSIVE METABOLIC PANEL - Abnormal       Result Value    Sodium 136      Potassium 3.9      Carbon Dioxide (CO2) 22      Anion Gap 14      Urea Nitrogen 18.9      Creatinine 1.32 (*)     GFR Estimate 60 (*)     Calcium 8.9      Chloride 100      Glucose 110 (*)     Alkaline Phosphatase 67       (*)      (*)     Protein Total 7.1      Albumin 4.3      Bilirubin Total 1.3 (*)    CBC WITH PLATELETS AND DIFFERENTIAL - Abnormal    WBC Count 7.7      RBC Count 5.03      Hemoglobin 15.2      Hematocrit 44.7      MCV 89      MCH 30.2      MCHC 34.0      RDW 12.8      Platelet Count 164      % Neutrophils 88      % Lymphocytes 5      % Monocytes 7      % Eosinophils 0      % Basophils 0      % Immature  Granulocytes 0      NRBCs per 100 WBC 0      Absolute Neutrophils 6.6      Absolute Lymphocytes 0.4 (*)     Absolute Monocytes 0.6      Absolute Eosinophils 0.0      Absolute Basophils 0.0      Absolute Immature Granulocytes 0.0      Absolute NRBCs 0.0     LIPASE - Normal    Lipase 18          Emergency Department Course & Assessments:    Interventions:  Medications   sodium chloride 0.9% BOLUS 1,000 mL (0 mLs Intravenous Stopped 2/4/24 1142)        Assessments:  Past medical records, nursing notes, and vitals reviewed.  I performed an exam of the patient and obtained history, as documented above.   Reassessed the patient.  He notes he is feeling comfortable.  He denies any new concerns.  I discussed findings of today's evaluation he is comfortable with the plan for discharge.    Independent Interpretation (X-rays, CTs, rhythm strip):  None    Consultations/Discussion of Management or Tests:  None     Social Determinants of Health affecting care:   None    Disposition:  The patient was discharged.     Impression & Plan      Medical Decision Making:  Victoriano Bourgeois is a 63-year-old male presents emergency room for upper abdominal pain after eating a large meal.  Has had similar pain in the past after similar food intake.  Multiple etiologies including gastritis, peptic ulcer disease, pancreatitis, biliary colic, cholecystitis, etc, were considered.  He does have evidence of gallstones and gallbladder sludge on ultrasound.  He has slight elevation of LFTs.  He has no evidence of pancreatitis.  He has noted hepatic steatosis as well.  His pain was minimal here in the emergency department and he had no recurrence after oral intake here.  He is recommended low-fat diet and avoidance of anything that triggers the symptoms.  He understands that his pain may be secondary to gallstones and gallbladder sludge and/or hepatic steatosis and he should follow-up with his primary care provider this week as well as consideration of  general surgery for elective removal of his gallbladder if he has ongoing issues.  He should return to the emergency department fever, vomiting, uncontrolled pain.  He feels comfortable that plan is discharged home in improved condition.      Diagnosis:    ICD-10-CM    1. Upper abdominal pain  R10.10       2. Calculus of gallbladder without cholecystitis without obstruction  K80.20       3. Hepatic steatosis  K76.0       4. Elevated LFTs  R79.89            2/4/2024   Mabel Becker MD Jonkman, Tracy Dianne, MD  02/04/24 1606

## 2024-02-05 ENCOUNTER — LAB REQUISITION (OUTPATIENT)
Dept: LAB | Facility: CLINIC | Age: 64
End: 2024-02-05

## 2024-02-05 DIAGNOSIS — Z12.5 ENCOUNTER FOR SCREENING FOR MALIGNANT NEOPLASM OF PROSTATE: ICD-10-CM

## 2024-02-05 DIAGNOSIS — E03.9 HYPOTHYROIDISM, UNSPECIFIED: ICD-10-CM

## 2024-02-05 LAB
ATRIAL RATE - MUSE: 98 BPM
DIASTOLIC BLOOD PRESSURE - MUSE: NORMAL MMHG
INTERPRETATION ECG - MUSE: NORMAL
P AXIS - MUSE: 17 DEGREES
PR INTERVAL - MUSE: 164 MS
QRS DURATION - MUSE: 146 MS
QT - MUSE: 368 MS
QTC - MUSE: 469 MS
R AXIS - MUSE: 103 DEGREES
SYSTOLIC BLOOD PRESSURE - MUSE: NORMAL MMHG
T AXIS - MUSE: -5 DEGREES
VENTRICULAR RATE- MUSE: 98 BPM

## 2024-02-05 PROCEDURE — G0103 PSA SCREENING: HCPCS | Performed by: PHYSICIAN ASSISTANT

## 2024-02-05 PROCEDURE — 84443 ASSAY THYROID STIM HORMONE: CPT | Performed by: PHYSICIAN ASSISTANT

## 2024-02-06 LAB
PSA SERPL DL<=0.01 NG/ML-MCNC: 0.2 NG/ML (ref 0–4.5)
TSH SERPL DL<=0.005 MIU/L-ACNC: 1.59 UIU/ML (ref 0.3–4.2)

## 2024-02-13 ENCOUNTER — OFFICE VISIT (OUTPATIENT)
Dept: SURGERY | Facility: CLINIC | Age: 64
End: 2024-02-13
Payer: COMMERCIAL

## 2024-02-13 VITALS
HEIGHT: 71 IN | BODY MASS INDEX: 32.06 KG/M2 | SYSTOLIC BLOOD PRESSURE: 152 MMHG | DIASTOLIC BLOOD PRESSURE: 84 MMHG | WEIGHT: 229 LBS | OXYGEN SATURATION: 97 % | HEART RATE: 48 BPM | RESPIRATION RATE: 16 BRPM

## 2024-02-13 DIAGNOSIS — K80.20 GALLSTONES: Primary | ICD-10-CM

## 2024-02-13 PROCEDURE — 99204 OFFICE O/P NEW MOD 45 MIN: CPT | Performed by: SURGERY

## 2024-02-13 RX ORDER — ASPIRIN 81 MG/1
81 TABLET ORAL DAILY
COMMUNITY

## 2024-02-13 RX ORDER — INDOCYANINE GREEN AND WATER 25 MG
2.5 KIT INJECTION ONCE
Status: CANCELLED | OUTPATIENT
Start: 2024-02-13 | End: 2024-02-13

## 2024-02-13 RX ORDER — LEVOTHYROXINE SODIUM 100 UG/1
100 TABLET ORAL DAILY
COMMUNITY
Start: 2022-01-01

## 2024-02-13 RX ORDER — NITROGLYCERIN 0.4 MG/1
0.4 TABLET SUBLINGUAL PRN
COMMUNITY
Start: 2023-11-07

## 2024-02-13 RX ORDER — LOSARTAN POTASSIUM 100 MG/1
100 TABLET ORAL DAILY
COMMUNITY
Start: 2020-01-01

## 2024-02-13 RX ORDER — DILTIAZEM HYDROCHLORIDE 120 MG/1
240 TABLET, FILM COATED ORAL DAILY
COMMUNITY

## 2024-02-13 NOTE — LETTER
February 13, 2024        RE:   Victoriano Bourgeois 1960      Dear Colleague,    Thank you for referring your patient, Victoriano Bourgeois, to [facility]. Please see a copy of my visit note below.    VALENCIA to follow up with Primary Care provider regarding elevated blood pressure.     Chief complaint:  Abdominal pain, epigastric    HPI:  This patient is a 63 year old male who presents with a recent episode of severe epigastric pain.  This has now resolved.  On a recent emergency room visit, the patient was found to have mild elevation of his liver function tests.  Previous test had been normal.    Past Medical History:  Hypertension  Coronary artery disease  Hypothyroidism    Past Surgical History:  Parathyroidectomy   coronary artery stents   Review of Systems:  The 10 point Review of Systems is negative other than noted in the HPI and above.    Physical Exam:  General - This is a well developed, well nourished male in no apparent distress.  HEENT - Normocephalic, atraumatic.  No scleral icterus.  Neck - supple without masses  Lungs - clear to ascultation.    Heart - Regular rate & rhythm without murmur  Abdomen:   soft, non-distended and nontender.    Extremities - warm without edema  Neurologic - nonfocal    Relevant labs:  Recent liver function tests revealed alkaline phosphatase of 67, ALT of 391, AST of 139 and a total bilirubin of 1.3.    Imaging:  Ultrasound showed sludge and small stones in the gallbladder.  No evidence of ductal dilatation.    Assessment and Plan:  This is a patient with recent epigastric abdominal pain and small stones in the gallbladder.  He also had some elevation of liver function tests, suggestive of a common duct stone.  I have recommended Laparoscopic cholecystectomy with cholangiograms.  We discussed the procedure, along with its risks and complications, in detail.  The patient has agreed to proceed.  We will work on getting this arranged for him in the  near future.      Again, thank you for allowing me to participate in the care of your patient.      Sincerely,      Kain Herbert MD

## 2024-02-13 NOTE — PROGRESS NOTES
Chief complaint:  Abdominal pain, epigastric    HPI:  This patient is a 63 year old male who presents with a recent episode of severe epigastric pain.  This has now resolved.  On a recent emergency room visit, the patient was found to have mild elevation of his liver function tests.  Previous test had been normal.    Past Medical History:  Hypertension  Coronary artery disease  Hypothyroidism      Past Surgical History:  Parathyroidectomy   coronary artery stents    Social History:  Social History     Socioeconomic History    Marital status:      Spouse name: Not on file    Number of children: Not on file    Years of education: Not on file    Highest education level: Not on file   Occupational History    Not on file   Tobacco Use    Smoking status: Not on file    Smokeless tobacco: Not on file   Substance and Sexual Activity    Alcohol use: Not on file    Drug use: Not on file    Sexual activity: Not on file   Other Topics Concern    Not on file   Social History Narrative    Not on file     Social Determinants of Health     Financial Resource Strain: Not on file   Food Insecurity: Not on file   Transportation Needs: Not on file   Physical Activity: Not on file   Stress: Not on file   Social Connections: Not on file   Interpersonal Safety: Not on file   Housing Stability: Not on file        Family History:  No family history on file.    Review of Systems:  The 10 point Review of Systems is negative other than noted in the HPI and above.    Physical Exam:  General - This is a well developed, well nourished male in no apparent distress.  HEENT - Normocephalic, atraumatic.  No scleral icterus.  Neck - supple without masses  Lungs - clear to ascultation.    Heart - Regular rate & rhythm without murmur  Abdomen:   soft, non-distended and nontender.    Extremities - warm without edema  Neurologic - nonfocal    Relevant labs:  Recent liver function tests revealed alkaline phosphatase of 67, ALT of 391, AST of 139 and  a total bilirubin of 1.3.    Imaging:  Ultrasound showed sludge and small stones in the gallbladder.  No evidence of ductal dilatation.    Assessment and Plan:  This is a patient with recent epigastric abdominal pain and small stones in the gallbladder.  He also had some elevation of liver function tests, suggestive of a common duct stone.  I have recommended Laparoscopic cholecystectomy with cholangiograms.  We discussed the procedure, along with its risks and complications, in detail.  The patient has agreed to proceed.  We will work on getting this arranged for him in the near future.    Kain Herbert MD  [facility]    Please route or send letter to:  Primary Care Provider (PCP)

## 2024-02-14 ENCOUNTER — TELEPHONE (OUTPATIENT)
Dept: SURGERY | Facility: CLINIC | Age: 64
End: 2024-02-14
Payer: COMMERCIAL

## 2024-02-14 NOTE — TELEPHONE ENCOUNTER
Type of surgery: LAPAROSCOPIC CHOLECYSTECTOMY WITH CHOLANGIOGRAMS  Location of surgery: [facility]  Date and time of surgery: 4-5-24, 1:00 PM  Surgeon: DR CROUCH   Pre-Op Appt Date: PATIENT TO SCHEDULE   Post-Op Appt Date: NA    Packet sent out: Yes  Pre-cert/Authorization completed:  Not Applicable  Date: 2-14-24        LAPAROSCOPIC CHOLECYSTECTOMY WITH CHOLANGIOGRAMS GENERAL PT INST TO HAVE H&P 75 MINS REQ PA ASSIST D[facility] ALW   (90 mins average)

## 2024-04-03 ENCOUNTER — LAB REQUISITION (OUTPATIENT)
Dept: LAB | Facility: CLINIC | Age: 64
End: 2024-04-03

## 2024-04-03 DIAGNOSIS — I10 ESSENTIAL (PRIMARY) HYPERTENSION: ICD-10-CM

## 2024-04-03 LAB
ANION GAP SERPL CALCULATED.3IONS-SCNC: 13 MMOL/L (ref 7–15)
BUN SERPL-MCNC: 11.4 MG/DL (ref 8–23)
CALCIUM SERPL-MCNC: 9.5 MG/DL (ref 8.8–10.2)
CHLORIDE SERPL-SCNC: 103 MMOL/L (ref 98–107)
CREAT SERPL-MCNC: 1.18 MG/DL (ref 0.67–1.17)
DEPRECATED HCO3 PLAS-SCNC: 24 MMOL/L (ref 22–29)
EGFRCR SERPLBLD CKD-EPI 2021: 69 ML/MIN/1.73M2
GLUCOSE SERPL-MCNC: 100 MG/DL (ref 70–99)
POTASSIUM SERPL-SCNC: 4.2 MMOL/L (ref 3.4–5.3)
SODIUM SERPL-SCNC: 140 MMOL/L (ref 135–145)

## 2024-04-03 PROCEDURE — 80048 BASIC METABOLIC PNL TOTAL CA: CPT | Performed by: PHYSICIAN ASSISTANT

## 2024-04-05 ENCOUNTER — TRANSFERRED RECORDS (OUTPATIENT)
Dept: SURGERY | Facility: CLINIC | Age: 64
End: 2024-04-05

## 2024-04-05 ENCOUNTER — ANESTHESIA (OUTPATIENT)
Dept: SURGERY | Facility: CLINIC | Age: 64
End: 2024-04-05
Payer: COMMERCIAL

## 2024-04-05 ENCOUNTER — APPOINTMENT (OUTPATIENT)
Dept: GENERAL RADIOLOGY | Facility: CLINIC | Age: 64
End: 2024-04-05
Attending: SURGERY
Payer: COMMERCIAL

## 2024-04-05 ENCOUNTER — APPOINTMENT (OUTPATIENT)
Dept: SURGERY | Facility: PHYSICIAN GROUP | Age: 64
End: 2024-04-05
Payer: COMMERCIAL

## 2024-04-05 ENCOUNTER — ANESTHESIA EVENT (OUTPATIENT)
Dept: SURGERY | Facility: CLINIC | Age: 64
End: 2024-04-05
Payer: COMMERCIAL

## 2024-04-05 ENCOUNTER — HOSPITAL ENCOUNTER (OUTPATIENT)
Facility: CLINIC | Age: 64
Discharge: HOME | End: 2024-04-05
Attending: SURGERY | Admitting: SURGERY
Payer: COMMERCIAL

## 2024-04-05 VITALS
HEART RATE: 55 BPM | RESPIRATION RATE: 16 BRPM | WEIGHT: 222.3 LBS | OXYGEN SATURATION: 92 % | SYSTOLIC BLOOD PRESSURE: 139 MMHG | BODY MASS INDEX: 31.45 KG/M2 | TEMPERATURE: 97.4 F | DIASTOLIC BLOOD PRESSURE: 86 MMHG

## 2024-04-05 DIAGNOSIS — K80.20 GALLSTONES: ICD-10-CM

## 2024-04-05 PROCEDURE — 255N000002 HC RX 255 OP 636: Performed by: SURGERY

## 2024-04-05 PROCEDURE — 250N000009 HC RX 250: Performed by: NURSE ANESTHETIST, CERTIFIED REGISTERED

## 2024-04-05 PROCEDURE — 272N000001 HC OR GENERAL SUPPLY STERILE: Performed by: SURGERY

## 2024-04-05 PROCEDURE — 258N000003 HC RX IP 258 OP 636: Performed by: NURSE ANESTHETIST, CERTIFIED REGISTERED

## 2024-04-05 PROCEDURE — 88304 TISSUE EXAM BY PATHOLOGIST: CPT | Mod: TC | Performed by: SURGERY

## 2024-04-05 PROCEDURE — 250N000011 HC RX IP 250 OP 636: Performed by: SURGERY

## 2024-04-05 PROCEDURE — 258N000003 HC RX IP 258 OP 636: Performed by: ANESTHESIOLOGY

## 2024-04-05 PROCEDURE — 47605 CHOLECYSTECTOMY W/CHOLANG: CPT | Performed by: SURGERY

## 2024-04-05 PROCEDURE — 88304 TISSUE EXAM BY PATHOLOGIST: CPT | Mod: 26 | Performed by: PATHOLOGY

## 2024-04-05 PROCEDURE — 360N000083 HC SURGERY LEVEL 3 W/ FLUORO, PER MIN: Performed by: SURGERY

## 2024-04-05 PROCEDURE — 370N000017 HC ANESTHESIA TECHNICAL FEE, PER MIN: Performed by: SURGERY

## 2024-04-05 PROCEDURE — 999N000141 HC STATISTIC PRE-PROCEDURE NURSING ASSESSMENT: Performed by: SURGERY

## 2024-04-05 PROCEDURE — 999N000179 XR SURGERY CARM FLUORO LESS THAN 5 MIN W STILLS

## 2024-04-05 PROCEDURE — 250N000025 HC SEVOFLURANE, PER MIN: Performed by: SURGERY

## 2024-04-05 PROCEDURE — 710N000012 HC RECOVERY PHASE 2, PER MINUTE: Performed by: SURGERY

## 2024-04-05 PROCEDURE — 47605 CHOLECYSTECTOMY W/CHOLANG: CPT | Mod: AS | Performed by: PHYSICIAN ASSISTANT

## 2024-04-05 PROCEDURE — 258N000001 HC RX 258: Performed by: SURGERY

## 2024-04-05 PROCEDURE — 250N000011 HC RX IP 250 OP 636: Performed by: NURSE ANESTHETIST, CERTIFIED REGISTERED

## 2024-04-05 PROCEDURE — 710N000009 HC RECOVERY PHASE 1, LEVEL 1, PER MIN: Performed by: SURGERY

## 2024-04-05 PROCEDURE — 250N000009 HC RX 250: Performed by: SURGERY

## 2024-04-05 RX ORDER — LIDOCAINE HYDROCHLORIDE 20 MG/ML
INJECTION, SOLUTION INFILTRATION; PERINEURAL PRN
Status: DISCONTINUED | OUTPATIENT
Start: 2024-04-05 | End: 2024-04-05

## 2024-04-05 RX ORDER — OXYCODONE HYDROCHLORIDE 5 MG/1
5 TABLET ORAL
Status: DISCONTINUED | OUTPATIENT
Start: 2024-04-05 | End: 2024-04-05 | Stop reason: HOSPADM

## 2024-04-05 RX ORDER — HYDROMORPHONE HCL IN WATER/PF 6 MG/30 ML
0.4 PATIENT CONTROLLED ANALGESIA SYRINGE INTRAVENOUS EVERY 5 MIN PRN
Status: DISCONTINUED | OUTPATIENT
Start: 2024-04-05 | End: 2024-04-05 | Stop reason: HOSPADM

## 2024-04-05 RX ORDER — OXYCODONE HYDROCHLORIDE 5 MG/1
10 TABLET ORAL
Status: DISCONTINUED | OUTPATIENT
Start: 2024-04-05 | End: 2024-04-05 | Stop reason: HOSPADM

## 2024-04-05 RX ORDER — LIDOCAINE 40 MG/G
CREAM TOPICAL
Status: DISCONTINUED | OUTPATIENT
Start: 2024-04-05 | End: 2024-04-05 | Stop reason: HOSPADM

## 2024-04-05 RX ORDER — PROPOFOL 10 MG/ML
INJECTION, EMULSION INTRAVENOUS PRN
Status: DISCONTINUED | OUTPATIENT
Start: 2024-04-05 | End: 2024-04-05

## 2024-04-05 RX ORDER — FENTANYL CITRATE 50 UG/ML
25 INJECTION, SOLUTION INTRAMUSCULAR; INTRAVENOUS EVERY 5 MIN PRN
Status: DISCONTINUED | OUTPATIENT
Start: 2024-04-05 | End: 2024-04-05 | Stop reason: HOSPADM

## 2024-04-05 RX ORDER — ONDANSETRON 2 MG/ML
4 INJECTION INTRAMUSCULAR; INTRAVENOUS EVERY 30 MIN PRN
Status: DISCONTINUED | OUTPATIENT
Start: 2024-04-05 | End: 2024-04-05 | Stop reason: HOSPADM

## 2024-04-05 RX ORDER — EPHEDRINE SULFATE 50 MG/ML
INJECTION, SOLUTION INTRAMUSCULAR; INTRAVENOUS; SUBCUTANEOUS PRN
Status: DISCONTINUED | OUTPATIENT
Start: 2024-04-05 | End: 2024-04-05

## 2024-04-05 RX ORDER — OXYCODONE HYDROCHLORIDE 5 MG/1
5-10 TABLET ORAL EVERY 4 HOURS PRN
Qty: 20 TABLET | Refills: 0 | Status: SHIPPED | OUTPATIENT
Start: 2024-04-05

## 2024-04-05 RX ORDER — NALOXONE HYDROCHLORIDE 0.4 MG/ML
0.1 INJECTION, SOLUTION INTRAMUSCULAR; INTRAVENOUS; SUBCUTANEOUS
Status: DISCONTINUED | OUTPATIENT
Start: 2024-04-05 | End: 2024-04-05 | Stop reason: HOSPADM

## 2024-04-05 RX ORDER — FENTANYL CITRATE 50 UG/ML
50 INJECTION, SOLUTION INTRAMUSCULAR; INTRAVENOUS EVERY 5 MIN PRN
Status: DISCONTINUED | OUTPATIENT
Start: 2024-04-05 | End: 2024-04-05 | Stop reason: HOSPADM

## 2024-04-05 RX ORDER — ONDANSETRON 4 MG/1
4 TABLET, ORALLY DISINTEGRATING ORAL EVERY 30 MIN PRN
Status: DISCONTINUED | OUTPATIENT
Start: 2024-04-05 | End: 2024-04-05 | Stop reason: HOSPADM

## 2024-04-05 RX ORDER — ONDANSETRON 2 MG/ML
INJECTION INTRAMUSCULAR; INTRAVENOUS PRN
Status: DISCONTINUED | OUTPATIENT
Start: 2024-04-05 | End: 2024-04-05

## 2024-04-05 RX ORDER — BUPIVACAINE HYDROCHLORIDE 5 MG/ML
INJECTION, SOLUTION PERINEURAL PRN
Status: DISCONTINUED | OUTPATIENT
Start: 2024-04-05 | End: 2024-04-05 | Stop reason: HOSPADM

## 2024-04-05 RX ORDER — HYDROMORPHONE HCL IN WATER/PF 6 MG/30 ML
0.2 PATIENT CONTROLLED ANALGESIA SYRINGE INTRAVENOUS EVERY 5 MIN PRN
Status: DISCONTINUED | OUTPATIENT
Start: 2024-04-05 | End: 2024-04-05 | Stop reason: HOSPADM

## 2024-04-05 RX ORDER — DEXAMETHASONE SODIUM PHOSPHATE 4 MG/ML
INJECTION, SOLUTION INTRA-ARTICULAR; INTRALESIONAL; INTRAMUSCULAR; INTRAVENOUS; SOFT TISSUE PRN
Status: DISCONTINUED | OUTPATIENT
Start: 2024-04-05 | End: 2024-04-05

## 2024-04-05 RX ORDER — INDOCYANINE GREEN AND WATER 25 MG
2.5 KIT INJECTION ONCE
Status: COMPLETED | OUTPATIENT
Start: 2024-04-05 | End: 2024-04-05

## 2024-04-05 RX ORDER — ONDANSETRON 4 MG/1
4 TABLET, ORALLY DISINTEGRATING ORAL EVERY 8 HOURS PRN
Qty: 10 TABLET | Refills: 0 | Status: SHIPPED | OUTPATIENT
Start: 2024-04-05

## 2024-04-05 RX ORDER — SODIUM CHLORIDE, SODIUM LACTATE, POTASSIUM CHLORIDE, CALCIUM CHLORIDE 600; 310; 30; 20 MG/100ML; MG/100ML; MG/100ML; MG/100ML
INJECTION, SOLUTION INTRAVENOUS CONTINUOUS PRN
Status: DISCONTINUED | OUTPATIENT
Start: 2024-04-05 | End: 2024-04-05

## 2024-04-05 RX ORDER — FENTANYL CITRATE 50 UG/ML
INJECTION, SOLUTION INTRAMUSCULAR; INTRAVENOUS PRN
Status: DISCONTINUED | OUTPATIENT
Start: 2024-04-05 | End: 2024-04-05

## 2024-04-05 RX ORDER — SODIUM CHLORIDE, SODIUM LACTATE, POTASSIUM CHLORIDE, CALCIUM CHLORIDE 600; 310; 30; 20 MG/100ML; MG/100ML; MG/100ML; MG/100ML
INJECTION, SOLUTION INTRAVENOUS CONTINUOUS
Status: DISCONTINUED | OUTPATIENT
Start: 2024-04-05 | End: 2024-04-05 | Stop reason: HOSPADM

## 2024-04-05 RX ORDER — CEFAZOLIN SODIUM/WATER 2 G/20 ML
2 SYRINGE (ML) INTRAVENOUS
Status: COMPLETED | OUTPATIENT
Start: 2024-04-05 | End: 2024-04-05

## 2024-04-05 RX ORDER — CEFAZOLIN SODIUM/WATER 2 G/20 ML
2 SYRINGE (ML) INTRAVENOUS SEE ADMIN INSTRUCTIONS
Status: DISCONTINUED | OUTPATIENT
Start: 2024-04-05 | End: 2024-04-05 | Stop reason: HOSPADM

## 2024-04-05 RX ORDER — GLYCOPYRROLATE 0.2 MG/ML
INJECTION, SOLUTION INTRAMUSCULAR; INTRAVENOUS PRN
Status: DISCONTINUED | OUTPATIENT
Start: 2024-04-05 | End: 2024-04-05

## 2024-04-05 RX ADMIN — LIDOCAINE HYDROCHLORIDE 40 MG: 20 INJECTION, SOLUTION INFILTRATION; PERINEURAL at 13:35

## 2024-04-05 RX ADMIN — DEXAMETHASONE SODIUM PHOSPHATE 8 MG: 4 INJECTION, SOLUTION INTRA-ARTICULAR; INTRALESIONAL; INTRAMUSCULAR; INTRAVENOUS; SOFT TISSUE at 13:35

## 2024-04-05 RX ADMIN — Medication 15 MG: at 13:47

## 2024-04-05 RX ADMIN — Medication 2 G: at 13:32

## 2024-04-05 RX ADMIN — ROCURONIUM BROMIDE 40 MG: 50 INJECTION, SOLUTION INTRAVENOUS at 13:35

## 2024-04-05 RX ADMIN — MIDAZOLAM 2 MG: 1 INJECTION INTRAMUSCULAR; INTRAVENOUS at 13:31

## 2024-04-05 RX ADMIN — PROPOFOL 200 MG: 10 INJECTION, EMULSION INTRAVENOUS at 13:35

## 2024-04-05 RX ADMIN — ONDANSETRON 4 MG: 2 INJECTION INTRAMUSCULAR; INTRAVENOUS at 13:41

## 2024-04-05 RX ADMIN — FENTANYL CITRATE 100 MCG: 50 INJECTION INTRAMUSCULAR; INTRAVENOUS at 13:35

## 2024-04-05 RX ADMIN — SODIUM CHLORIDE, POTASSIUM CHLORIDE, SODIUM LACTATE AND CALCIUM CHLORIDE: 600; 310; 30; 20 INJECTION, SOLUTION INTRAVENOUS at 14:28

## 2024-04-05 RX ADMIN — GLYCOPYRROLATE 0.2 MG: 0.2 INJECTION, SOLUTION INTRAMUSCULAR; INTRAVENOUS at 13:35

## 2024-04-05 RX ADMIN — INDOCYANINE GREEN AND WATER 2.5 MG: KIT at 12:48

## 2024-04-05 RX ADMIN — HYDROMORPHONE HYDROCHLORIDE 1 MG: 1 INJECTION, SOLUTION INTRAMUSCULAR; INTRAVENOUS; SUBCUTANEOUS at 14:29

## 2024-04-05 RX ADMIN — ROCURONIUM BROMIDE 20 MG: 50 INJECTION, SOLUTION INTRAVENOUS at 13:51

## 2024-04-05 RX ADMIN — SUGAMMADEX 200 MG: 100 INJECTION, SOLUTION INTRAVENOUS at 14:40

## 2024-04-05 RX ADMIN — SODIUM CHLORIDE, POTASSIUM CHLORIDE, SODIUM LACTATE AND CALCIUM CHLORIDE: 600; 310; 30; 20 INJECTION, SOLUTION INTRAVENOUS at 12:48

## 2024-04-05 RX ADMIN — SODIUM CHLORIDE, POTASSIUM CHLORIDE, SODIUM LACTATE AND CALCIUM CHLORIDE: 600; 310; 30; 20 INJECTION, SOLUTION INTRAVENOUS at 13:05

## 2024-04-05 RX ADMIN — HYDROMORPHONE HYDROCHLORIDE 1 MG: 1 INJECTION, SOLUTION INTRAMUSCULAR; INTRAVENOUS; SUBCUTANEOUS at 13:53

## 2024-04-05 ASSESSMENT — ACTIVITIES OF DAILY LIVING (ADL)
ADLS_ACUITY_SCORE: 31
ADLS_ACUITY_SCORE: 31
ADLS_ACUITY_SCORE: 29
ADLS_ACUITY_SCORE: 31
ADLS_ACUITY_SCORE: 31

## 2024-04-05 ASSESSMENT — LIFESTYLE VARIABLES: TOBACCO_USE: 1

## 2024-04-05 NOTE — ANESTHESIA PROCEDURE NOTES
Airway       Patient location during procedure: OR       Procedure Start/Stop Times: 4/5/2024 1:36 PM  Staff -        Performed By: CRNA  Consent for Airway        Urgency: elective  Indications and Patient Condition       Indications for airway management: denaa-procedural and airway protection       Induction type:intravenous       Mask difficulty assessment: 1 - vent by mask    Final Airway Details       Final airway type: endotracheal airway       Successful airway: ETT - single and Oral  Endotracheal Airway Details        ETT size (mm): 8.0       Cuffed: yes       Successful intubation technique: direct laryngoscopy       DL Blade Type: Barnett 2       Grade View of Cords: 1       Position: Right       Measured from: lips       Secured at (cm): 25       Bite block used: None    Post intubation assessment        Placement verified by: capnometry        Number of attempts at approach: 1       Number of other approaches attempted: 0       Secured with: tape       Ease of procedure: easy       Dentition: Intact    Medication(s) Administered   Medication Administration Time: 4/5/2024 1:36 PM

## 2024-04-05 NOTE — ANESTHESIA POSTPROCEDURE EVALUATION
Patient: Victoriano Bourgeois    Procedure: Procedure(s):  CHOLECYSTECTOMY, LAPAROSCOPIC, WITH CHOLANGIOGRAM       Anesthesia Type:  General    Note:  Disposition: Outpatient   Postop Pain Control: Uneventful            Sign Out: Well controlled pain   PONV: No   Neuro/Psych: Uneventful            Sign Out: Acceptable/Baseline neuro status   Airway/Respiratory: Uneventful            Sign Out: Acceptable/Baseline resp. status   CV/Hemodynamics: Uneventful            Sign Out: Acceptable CV status   Other NRE: NONE   DID A NON-ROUTINE EVENT OCCUR? No           Last vitals:  Vitals Value Taken Time   /92 04/05/24 1455   Temp 97.6  F (36.4  C) 04/05/24 1448   Pulse 80 04/05/24 1500   Resp 8 04/05/24 1500   SpO2 95 % 04/05/24 1500   Vitals shown include unfiled device data.    Electronically Signed By: Sven Borjas MD  April 5, 2024  3:01 PM

## 2024-04-05 NOTE — ANESTHESIA PREPROCEDURE EVALUATION
Anesthesia Pre-Procedure Evaluation    Patient: Victoriano Bourgeois   MRN: [id number] : 1960        Procedure : Procedure(s):  CHOLECYSTECTOMY, LAPAROSCOPIC, WITH CHOLANGIOGRAM          Past Medical History:   Diagnosis Date    Coronary artery disease     Heart attack (H)     Hypertension     Mixed hyperlipidemia     Stented coronary artery     Thyroid disease       Past Surgical History:   Procedure Laterality Date    CARDIAC SURGERY  2019    2 cardiac stents    ENT SURGERY      tonsillectomy    ORTHOPEDIC SURGERY  2019    knee scope    PARATHYROIDECTOMY        Allergies   Allergen Reactions    Chlorthalidone Muscle Pain (Myalgia)     Cramping severe    Metoprolol Other (See Comments)     Bloating, weight gain, heart burn.    Amlodipine Rash    Carvedilol Other (See Comments)     Wt gain, bloating, loss of appetite    Isosorbide Nitrate Headache    Codeine Unknown     Other Reaction(s): *Unknown    Statins Muscle Pain (Myalgia)    Atorvastatin Muscle Pain (Myalgia)      Social History     Tobacco Use    Smoking status: Former     Types: Cigarettes     Quit date:      Years since quittin.2    Smokeless tobacco: Never   Substance Use Topics    Alcohol use: Yes     Comment: 4-6/week      Wt Readings from Last 1 Encounters:   24 100.8 kg (222 lb 4.8 oz)        Anesthesia Evaluation            ROS/MED HX  ENT/Pulmonary:     (+)                tobacco use, Past use,                       Neurologic:  - neg neurologic ROS     Cardiovascular:     (+)  hypertension- -  CAD - past MI - stent- 2                                     METS/Exercise Tolerance: >4 METS    Hematologic:  - neg hematologic  ROS     Musculoskeletal:  - neg musculoskeletal ROS     GI/Hepatic:  - neg GI/hepatic ROS     Renal/Genitourinary:  - neg Renal ROS     Endo:     (+)          thyroid problem,            Psychiatric/Substance Use:  - neg psychiatric ROS     Infectious Disease:  - neg infectious disease ROS     Malignancy:  -  "neg malignancy ROS     Other:  - neg other ROS          Physical Exam    Airway        Mallampati: II    Neck ROM: full     Respiratory Devices and Support         Dental           Cardiovascular   cardiovascular exam normal       Rhythm and rate: regular     Pulmonary   pulmonary exam normal                OUTSIDE LABS:  CBC:   Lab Results   Component Value Date    WBC 7.7 02/04/2024    WBC 5.8 01/27/2021    HGB 15.2 02/04/2024    HGB 14.2 01/27/2021    HCT 44.7 02/04/2024    HCT 42.4 01/27/2021     02/04/2024     01/27/2021     BMP:   Lab Results   Component Value Date     04/03/2024     02/04/2024    POTASSIUM 4.2 04/03/2024    POTASSIUM 3.9 02/04/2024    CHLORIDE 103 04/03/2024    CHLORIDE 100 02/04/2024    CO2 24 04/03/2024    CO2 22 02/04/2024    BUN 11.4 04/03/2024    BUN 18.9 02/04/2024    CR 1.18 (H) 04/03/2024    CR 1.32 (H) 02/04/2024     (H) 04/03/2024     (H) 02/04/2024     COAGS: No results found for: \"PTT\", \"INR\", \"FIBR\"  POC: No results found for: \"BGM\", \"HCG\", \"HCGS\"  HEPATIC:   Lab Results   Component Value Date    ALBUMIN 4.3 02/04/2024    PROTTOTAL 7.1 02/04/2024     (H) 02/04/2024     (H) 02/04/2024    ALKPHOS 67 02/04/2024    BILITOTAL 1.3 (H) 02/04/2024     OTHER:   Lab Results   Component Value Date    A1C 6.1 (H) 03/20/2023    ТАТЬЯНА 9.5 04/03/2024    LIPASE 18 02/04/2024    TSH 1.59 02/05/2024    T4 1.32 03/20/2023    CRP <0.1 08/28/2019    SED 10 08/28/2019       Anesthesia Plan    ASA Status:  3       Anesthesia Type: General.     - Airway: ETT   Induction: Intravenous, Propofol.   Maintenance: Balanced.        Consents    Anesthesia Plan(s) and associated risks, benefits, and realistic alternatives discussed. Questions answered and patient/representative(s) expressed understanding.     - Discussed:     - Discussed with:  Patient            Postoperative Care    Pain management: IV analgesics, Oral pain medications, Multi-modal analgesia. "   PONV prophylaxis: Ondansetron (or other 5HT-3), Dexamethasone or Solumedrol     Comments:               Pranav De Guzman, DO    I have reviewed the pertinent notes and labs in the chart from the past 30 days and (re)examined the patient.  Any updates or changes from those notes are reflected in this note.             # Drug Induced Platelet Defect: home medication list includes an antiplatelet medication

## 2024-04-05 NOTE — ANESTHESIA CARE TRANSFER NOTE
Patient: Victoriano Bourgeois    Procedure: Procedure(s):  CHOLECYSTECTOMY, LAPAROSCOPIC, WITH CHOLANGIOGRAM       Diagnosis: Gallstones [K80.20]  Diagnosis Additional Information: No value filed.    Anesthesia Type:   General     Note:    Oropharynx: spontaneously breathing  Level of Consciousness: awake  Oxygen Supplementation: face mask    Independent Airway: airway patency satisfactory and stable  Dentition: dentition unchanged  Vital Signs Stable: post-procedure vital signs reviewed and stable  Report to RN Given: handoff report given  Patient transferred to: PACU    Handoff Report: Identifed the Patient, Identified the Reponsible Provider, Reviewed the pertinent medical history, Discussed the surgical course, Reviewed Intra-OP anesthesia mangement and issues during anesthesia, Set expectations for post-procedure period and Allowed opportunity for questions and acknowledgement of understanding      Vitals:  Vitals Value Taken Time   /92 04/05/24 1447   Temp     Pulse 88 04/05/24 1450   Resp 9 04/05/24 1450   SpO2 95 % 04/05/24 1450   Vitals shown include unfiled device data.    Electronically Signed By: NEIL Aranda CRNA  April 5, 2024  2:51 PM

## 2024-04-05 NOTE — DISCHARGE INSTRUCTIONS
HOME CARE FOLLOWING LAPAROSCOPIC CHOLECYSTECTOMY  KIERA Hope, NASREEN Trujillo C. Pratt, J. Shaheen    INCISIONAL CARE:  Replace the bandage over your incisions DAILY until all drainage stops, or if more comfortable to have in place.  If present, leave the steri-strips (white paper tapes) in place for 14 days after surgery.  If Dermabond (a type of skin glue) is present, leave in place until it wears/flakes off (2-3 weeks).     BATHING:  OK to shower 48 hours after surgery.  Avoid baths for 1 week after surgery.  You may wash your hair at any time.  Gently pat your incision dry after bathing.  Do not apply lotions, creams, or ointments to incisions.    ACTIVITY:  Light Activity -- you may immediately be up and about as tolerated.  Walking is encouraged, increase as tolerated.  Driving/Light Work-- when comfortable and off narcotic pain medications.  Strenuous Work/Activity -- limit lifting to 20 pounds for 2 weeks.  Progressively increase with time.  Active Sports (running, biking, etc.) -- cautiously resume after 2 weeks.    DISCOMFORT:  Local anesthetic placed at surgery should provide relief for 4-8 hours.  Begin taking pain pills before discomfort is severe.  Take the pain medication with some food, when possible, to minimize side effects.  Intermittent use of ice packs may help during the first 1-3 weeks after surgery.  Expect gradual improvement.    Over-the-counter anti-inflammatory medications (i.e. Ibuprofen/Advil/Motrin or Naprosyn/Aleve) may be used per package instructions in addition to or while tapering off the narcotic pain medications to decrease swelling and sensitivity.  DO NOT TAKE these Anti-inflammatory medications if your primary physician has advised against doing so, or if you have acid reflux, ulcer, or bleeding disorder, or take blood-thinner medications.  Call your primary physician or the surgery office if you have medication questions.    After laparoscopic  cholecystectomy, you may have shoulder or upper back discomfort due to the gas used during surgery.  This is temporary and should resolve within 2-3 days.  Frequent short walks may help with this.  You may have decreased energy level for 1-2 weeks after surgery related to your recovery.    DIET:  Start with liquids and gradually increase diet as tolerated.  Drink plenty of fluids.  While taking pain medications, consider use of a stool softener, increase your fiber in your diet, or add a fiber supplement (like Metamucil, Citrucel) to help prevent constipation - a possible side effect of pain medications.  It is not uncommon to experience some bowel changes (loose stools or constipation) after surgery.  Your body has to adapt to you no longer having a gall bladder.  To help minimize this side effect, avoid fatty foods for 1-2 weeks after surgery.  You may then slowly increase the amount of fatty foods in your diet.      NAUSEA:  If nauseated from the anesthetic/pain meds; rest in bed, get up cautiously with assistance, and drink clear liquids (juice, tea, broth).    FOLLOW-UP AFTER SURGERY:  -Our office will contact you approximately 2-3 weeks after surgery to check on your progress and answer any questions you may have.  If you are doing well, you will not need to return for an office appointment.  If any concerns are identified over the phone, we will help you make an appointment to see a provider.    -If you have not received a phone call, have any questions or concerns, or would like to be seen, please call us at [phone].  We are located at: [address], [address]; [place] [address]    -CONTACT US IF THE FOLLOWING DEVELOPS:   1. A fever that is above 101     2. Increased redness, warmth, drainage, bleeding, or swelling.   3. Pain that is not relieved by rest/ice and your prescription.   4.  Increasing pain after 48 hours.   5. Drainage that is thick, cloudy, yellow, green or white.   6. Any other  questions or concerns.      FREQUENTLY ASKED QUESTIONS:    Q:  How should my incision look?    A:  Normally your incision will appear slightly swollen with light redness directly along the incision itself as it heals.  It may feel like a bump or ridge as the healing/scarring happens, and over time (3-4 months) this bump or ridge feeling should slowly go away.  In general, clear or pink watery drainage can be normal at first as your incision heals, but should decrease over time.    Q:  How do I know if my incision is infected?  A:  Look at your incision for signs of infection, like redness around the incision spreading to surrounding skin, or drainage of cloudy or foul-smelling drainage.  If you feel warm, check your temperature to see if you are running a fever.    **If any of these things occur, please notify the nurse at our office.  We may need you to come into the office for an incision check.      Q:  How do I take care of my incision?  A:  If you have a dressing in place - Starting the day after surgery, replace the dressing 1-2 times a day until there is no further drainage from the incision.  At that time, a dressing is no longer needed.  Try to minimize tape on the skin if irritation is occurring at the tape sites.  If you have significant irritation from tape on the skin, please call the office to discuss other method of dressing your incision.    Small pieces of tape called  steri-strips  may be present directly overlying your incision; these may be removed 10 days after surgery unless otherwise specified by your surgeon.  If these tapes start to loosen at the ends, you may trim them back until they fall off or are removed.    A:  If you had  Dermabond  tissue glue used as a dressing (this causes your incision to look shiny with a clear covering over it) - This type of dressing wears off with time and does not require more dressings over the top unless it is draining around the glue as it wears off.  Do  not apply ointments or lotions over the incisions until the glue has completely worn off.    Q:  There is a piece of tape or a sticky  lead  still on my skin.  Can I remove this?  A:  Sometimes the sticky  leads  used for monitoring during surgery or for evaluation in the emergency department are not all removed while you are in the hospital.  These sometimes have a tab or metal dot on them.  You can easily remove these on your own, like taking off a band-aid.  If there is a gel substance under the  lead , simply wipe/clean it off with a washcloth or paper towel.      Q:  What can I do to minimize constipation (very hard stools, or lack of stools)?  A:  Stay well hydrated.  Increase your dietary fiber intake or take a fiber supplement -with plenty of water.  Walk around frequently.  You may consider an over-the-counter stool-softener.  Your Pharmacist can assist you with choosing one that is stocked at your pharmacy.  Constipation is also one of the most common side effects of pain medication.  If you are using pain medication, be pro-active and try to PREVENT problems with constipation by taking the steps above BEFORE constipation becomes a problem.    Q:  What do I do if I need more pain medications?  A:  Call the office to receive refills.  Be aware that certain pain meds cannot be called into a pharmacy and actually require a paper prescription.  A change may be made in your pain med as you progress thru your recovery period or if you have side effects to certain meds.    --Pain meds are NOT refilled after 5pm on weekdays, and NOT AT ALL on the weekends, so please look ahead to prevent problems.      Q:  Why am I having a hard time sleeping now that I am at home?  A:  Many medications you receive while you are in the hospital can impact your sleep for a number of days after your surgery/hospitalization.  Decreased level of activity and naps during the day may also make sleeping at night difficult.  Try to  minimize day-time naps, and get up frequently during the day to walk around your home during your recovery time.  Sleep aides may be of some help, but are not recommended for long-term use.      Q:  I am having some back discomfort.  What should I do?  A:  This may be related to certain positioning that was required for your surgery, extended periods of time in bed, or other changes in your overall activity level.  You may try ice, heat, acetaminophen, or ibuprofen to treat this temporarily.  Note that many pain medications have acetaminophen in them and would state this on the prescription bottle.  Be sure not to exceed the maximum of 4000mg per day of acetaminophen.     **If the pain you are having does not resolve, is severe, or is a flare of back pain you have had on other occasions prior to surgery, please contact your primary physician for further recommendations or for an appointment to be examined at their office.    Q:  Why am I having headaches?  A:  Headaches can be caused by many things:  caffeine withdrawal, use of pain meds, dehydration, high blood pressure, lack of sleep, over-activity/exhaustion, flare-up of usual migraine headaches.  If you feel this is related to muscle tension (a band-like feeling around the head, or a pressure at the low-back of the head) you may try ice or heat to this area.  You may need to drink more fluids (try electrolyte drink like Gatorade), rest, or take your usual migraine medications.   **If your headaches do not resolve, worsen, are accompanied by other symptoms, or if your blood pressure is high, please call your primary physician for recommendation and/or examination.    Q:  I am unable to urinate.  What do I do?  A:  A small percentage of people can have difficulty urinating initially after surgery.  This includes being able to urinate only a very small amount at a time and feeling discomfort or pressure in the very low abdomen.  This is called  urinary retention ,  and is actually an urgent situation.  Proceed to your nearest Emergency department for evaluation (not an Urgent Care Center).  Sometimes the bladder does not work correctly after certain medications you receive during surgery, or related to certain procedures.  You may need to have a catheter placed until your bladder recovers.  When planning to go to an Emergency department, it may help to call the ER to let them know you are coming in for this problem after a surgery.  This may help you get in quicker to be evaluated.  **If you have symptoms of a urinary tract infection, please contact your primary physician for the proper evaluation and treatment.          If you have other questions, please call the office Monday thru Friday between 8am and 4:30pm to discuss with the nurse or physician assistant.  #[phone]    There is a surgeon ON CALL on weekday evenings and over the weekend in case of urgent need only, and may be contacted at the same number.    If you are having an emergency, call 911 or proceed to your nearest emergency department.    If a green dye was used during your procedure, your urine will initially be bright blue or greenish.  It will gradually return to yellow throughout the day.  Drinking plenty of fluids will help to filter the dye from your urine.     Maximum acetaminophen (Tylenol) dose from all sources should not exceed 4 grams (4000 mg) per day.

## 2024-04-05 NOTE — OP NOTE
General Surgery Operative Note    Pre-operative diagnosis:  Gallstones [K80.20]   Post-operative diagnosis: same   Procedure: Laparoscopic Cholecystectomy with cholangiograms   Surgeon: Kain Herbert MD   Assistant(s): Meg Kamara PA-C - the physician assistant was medically necessary to assist in prepping, positioning, camera operation, retraction/exposure and closure of the port site.    Anesthesia: General    Estimated blood loss: 5 cc's   Drains placed: None   Complications:  None   Findings:  Gallbladder with dense scarring and a very thin posterior wall, suggesting significant previous inflammation.  Intraoperative cholangiogram revealed no evidence of common duct stones.     INDICATIONS FOR OPERATION: This is a patient with upper abdominal pain, gallstones and elevation of liver function tests.  Laparoscopic cholecystectomy with cholangiogram was recommended.  The procedure along with its risks and complications was discussed in detail and the patient agreed to proceed.    DETAILS OF THE OPERATION: After informed consent the patient was taken to the operating room where he underwent satisfactory induction of general anesthesia.  The patient was then sterilely prepped and draped.  A supraumbilical skin incision was made using a skin knife.  The dissection was carried bluntly down to the fascia.  The fascia was opened using electrocautery and the Espinoza trocar was then inserted.  Pneumoperitoneum was achieved using CO2 insufflation, and under direct visualization  three  5 mm upper abdominal ports were placed.  There were dense adhesions in the upper abdomen, completely covering the gallbladder.  Gradually, these adhesions were taken down using electrocautery and blunt dissection.  The gallbladder was visualized and was grasped. It was pulled up over the liver and the cystic duct was exposed.  Anatomy was visualized with the assistance of ICG fluorescence imaging.  The cystic duct was then  skeletonized, and a single clip was placed on the gallbladder end.  A cholangiogram catheter was inserted into the abdomen through 12-gauge Angiocath, and was then threaded into the cystic duct and clipped in place.  C-arm cholangiogram was performed.  There was no evidence of common duct stones.  The cholangiogram catheter was now removed and the cystic duct was double clipped on the common duct end and divided.  The cystic artery was now skeletonized, triple clipped and divided.  The posterior branch was also clipped.The gallbladder was then dissected away from the liver using electrocautery.  This dissection was quite difficult due to fairly dense fibrosis.  The gallbladder was then placed in an Endo Catch bag and removed through the supraumbilical incision.  The gallbladder fossa was irrigated out.  There was excellent hemostasis and the clips were in good position.  Surgicel Nu-Knit was now placed in the gallbladder fossa to maintain hemostasis.  The trocar sites were now infiltrated with half percent Marcaine with epinephrine.  The trochars were removed under direct visualization.  The supraumbilical fascia was then closed using 0 Vicryl suture.  Skin incisions were closed using 4-0 subcuticular Vicryl followed by Steri-Strips.    The patient was transferred to the recovery room in satisfactory condition.  Sponge and needle counts were correct at the close of the case.      Specimens:   ID Type Source Tests Collected by Time Destination   1 : Gallbladder and contents Tissue Gallbladder SURGICAL PATHOLOGY EXAM Kain Herbert MD 4/5/2024  2:16 PM            Kain Herbert MD

## 2024-04-08 LAB
PATH REPORT.COMMENTS IMP SPEC: NORMAL
PATH REPORT.COMMENTS IMP SPEC: NORMAL
PATH REPORT.FINAL DX SPEC: NORMAL
PATH REPORT.GROSS SPEC: NORMAL
PATH REPORT.MICROSCOPIC SPEC OTHER STN: NORMAL
PATH REPORT.RELEVANT HX SPEC: NORMAL
PHOTO IMAGE: NORMAL

## 2024-05-01 ENCOUNTER — TELEPHONE (OUTPATIENT)
Dept: SURGERY | Facility: CLINIC | Age: 64
End: 2024-05-01
Payer: COMMERCIAL

## 2024-05-01 DIAGNOSIS — Z98.890 POSTOPERATIVE STATE: Primary | ICD-10-CM

## 2024-05-01 NOTE — TELEPHONE ENCOUNTER
[facility]   Postoperative Follow-up Phone Call  -Call to patient to review recent procedure and recovery    Attempted to call patient for post op check.  No answer.  Message was left for patient to call back if they had any questions or concerns.  [employer] message sent.    Meg Kamara PA-C       [employer] message:   Christy Del Rio,     I left a message on your ph# to call if you have questions/concerns after your gall bladder surgery.    I also wanted to pass on to you that your pathology report confirmed:  inflammation of your gall bladder and that there were gallstones present.      You may now remove skin glue or tapes from incision sites.    You may slowly increase activity as tolerated.  Resume workout/strenuous activity at 1 month after surgery.    Please contact me if other questions.    Thank you!  Meg Kamara PA-C

## 2024-06-22 ENCOUNTER — HEALTH MAINTENANCE LETTER (OUTPATIENT)
Age: 64
End: 2024-06-22

## 2024-08-06 NOTE — PROCEDURE: MOHS SURGERY
Pt found with stool on hands, pt rubbing hands together.  Asked pt why he was playing with poop, pt stated he didn't know why.  Stool on pts chest, gown, smeared on bed.   Complete bath given.  Pt alert, follows commands.  When asked questions will laugh, and will sometimes try to answer.  He knows his first name is Era.  Unable to tell me his last name or date of birth.  He is unable to tell me how old he is.  He knows he is in the hospital. Thinks he is at [facility].  Doesn't know why he is here.  He is unable to tell me his sisters name.  States he knows it, but can't think of it right now.     Meds given to patient crushed and in applesauce.  Took a few drinks of pepsi.    Denies pain. Call light in reach.    Tumor Debulked?: curette

## 2024-08-31 ENCOUNTER — HEALTH MAINTENANCE LETTER (OUTPATIENT)
Age: 64
End: 2024-08-31

## 2025-02-16 ENCOUNTER — APPOINTMENT (OUTPATIENT)
Dept: CT IMAGING | Facility: CLINIC | Age: 65
DRG: 871 | End: 2025-02-16
Attending: EMERGENCY MEDICINE
Payer: MEDICARE

## 2025-02-16 ENCOUNTER — APPOINTMENT (OUTPATIENT)
Dept: GENERAL RADIOLOGY | Facility: CLINIC | Age: 65
DRG: 871 | End: 2025-02-16
Attending: EMERGENCY MEDICINE
Payer: MEDICARE

## 2025-02-16 ENCOUNTER — HOSPITAL ENCOUNTER (INPATIENT)
Facility: CLINIC | Age: 65
DRG: 871 | End: 2025-02-16
Attending: EMERGENCY MEDICINE | Admitting: INTERNAL MEDICINE
Payer: MEDICARE

## 2025-02-16 DIAGNOSIS — D72.829 LEUKOCYTOSIS, UNSPECIFIED TYPE: ICD-10-CM

## 2025-02-16 DIAGNOSIS — R93.5 ABNORMAL CT OF THE ABDOMEN: ICD-10-CM

## 2025-02-16 DIAGNOSIS — R63.0 DECREASED APPETITE: ICD-10-CM

## 2025-02-16 DIAGNOSIS — I10 HYPERTENSION, UNSPECIFIED TYPE: ICD-10-CM

## 2025-02-16 DIAGNOSIS — R50.9 FEVER IN ADULT: ICD-10-CM

## 2025-02-16 DIAGNOSIS — N17.9 ACUTE KIDNEY INJURY: ICD-10-CM

## 2025-02-16 DIAGNOSIS — K75.0 LIVER ABSCESS: Primary | ICD-10-CM

## 2025-02-16 LAB
ALBUMIN SERPL BCG-MCNC: 4 G/DL (ref 3.5–5.2)
ALBUMIN UR-MCNC: 70 MG/DL
ALP SERPL-CCNC: 62 U/L (ref 40–150)
ALT SERPL W P-5'-P-CCNC: 53 U/L (ref 0–70)
ANION GAP SERPL CALCULATED.3IONS-SCNC: 17 MMOL/L (ref 7–15)
APPEARANCE UR: ABNORMAL
AST SERPL W P-5'-P-CCNC: 54 U/L (ref 0–45)
BASOPHILS # BLD AUTO: 0.1 10E3/UL (ref 0–0.2)
BASOPHILS NFR BLD AUTO: 0 %
BILIRUB SERPL-MCNC: 0.7 MG/DL
BILIRUB UR QL STRIP: ABNORMAL
BUN SERPL-MCNC: 25 MG/DL (ref 8–23)
CALCIUM SERPL-MCNC: 9.3 MG/DL (ref 8.8–10.4)
CHLORIDE SERPL-SCNC: 97 MMOL/L (ref 98–107)
CK SERPL-CCNC: 148 U/L (ref 39–308)
COLOR UR AUTO: YELLOW
CREAT SERPL-MCNC: 2.03 MG/DL (ref 0.67–1.17)
EGFRCR SERPLBLD CKD-EPI 2021: 36 ML/MIN/1.73M2
EOSINOPHIL # BLD AUTO: 0 10E3/UL (ref 0–0.7)
EOSINOPHIL NFR BLD AUTO: 0 %
ERYTHROCYTE [DISTWIDTH] IN BLOOD BY AUTOMATED COUNT: 13.2 % (ref 10–15)
FLUAV RNA SPEC QL NAA+PROBE: NEGATIVE
FLUBV RNA RESP QL NAA+PROBE: NEGATIVE
GLUCOSE SERPL-MCNC: 138 MG/DL (ref 70–99)
GLUCOSE UR STRIP-MCNC: NEGATIVE MG/DL
HCO3 BLDV-SCNC: 25 MMOL/L (ref 21–28)
HCO3 SERPL-SCNC: 22 MMOL/L (ref 22–29)
HCT VFR BLD AUTO: 41.7 % (ref 40–53)
HGB BLD-MCNC: 13.8 G/DL (ref 13.3–17.7)
HGB UR QL STRIP: NEGATIVE
HOLD SPECIMEN: NORMAL
HYALINE CASTS: 4 /LPF
IMM GRANULOCYTES # BLD: 0.1 10E3/UL
IMM GRANULOCYTES NFR BLD: 1 %
KETONES UR STRIP-MCNC: NEGATIVE MG/DL
LACTATE BLD-SCNC: 2.2 MMOL/L
LEUKOCYTE ESTERASE UR QL STRIP: NEGATIVE
LIPASE SERPL-CCNC: 28 U/L (ref 13–60)
LYMPHOCYTES # BLD AUTO: 0.9 10E3/UL (ref 0.8–5.3)
LYMPHOCYTES NFR BLD AUTO: 5 %
MCH RBC QN AUTO: 28.2 PG (ref 26.5–33)
MCHC RBC AUTO-ENTMCNC: 33.1 G/DL (ref 31.5–36.5)
MCV RBC AUTO: 85 FL (ref 78–100)
MONOCYTES # BLD AUTO: 0.8 10E3/UL (ref 0–1.3)
MONOCYTES NFR BLD AUTO: 4 %
MUCOUS THREADS #/AREA URNS LPF: PRESENT /LPF
NEUTROPHILS # BLD AUTO: 15.3 10E3/UL (ref 1.6–8.3)
NEUTROPHILS NFR BLD AUTO: 89 %
NITRATE UR QL: NEGATIVE
NRBC # BLD AUTO: 0 10E3/UL
NRBC BLD AUTO-RTO: 0 /100
PCO2 BLDV: 49 MM HG (ref 40–50)
PH BLDV: 7.3 [PH] (ref 7.32–7.43)
PH UR STRIP: 5.5 [PH] (ref 5–7)
PLATELET # BLD AUTO: 303 10E3/UL (ref 150–450)
PO2 BLDV: 14 MM HG (ref 25–47)
POTASSIUM SERPL-SCNC: 4 MMOL/L (ref 3.4–5.3)
PROT SERPL-MCNC: 7.5 G/DL (ref 6.4–8.3)
RBC # BLD AUTO: 4.9 10E6/UL (ref 4.4–5.9)
RBC URINE: 3 /HPF
RSV RNA SPEC NAA+PROBE: NEGATIVE
SAO2 % BLDV: 15 % (ref 70–75)
SARS-COV-2 RNA RESP QL NAA+PROBE: NEGATIVE
SODIUM SERPL-SCNC: 136 MMOL/L (ref 135–145)
SP GR UR STRIP: 1.03 (ref 1–1.03)
UROBILINOGEN UR STRIP-MCNC: 4 MG/DL
WBC # BLD AUTO: 17.1 10E3/UL (ref 4–11)
WBC URINE: 9 /HPF

## 2025-02-16 PROCEDURE — 86140 C-REACTIVE PROTEIN: CPT | Performed by: INTERNAL MEDICINE

## 2025-02-16 PROCEDURE — 87086 URINE CULTURE/COLONY COUNT: CPT | Performed by: INTERNAL MEDICINE

## 2025-02-16 PROCEDURE — 84443 ASSAY THYROID STIM HORMONE: CPT | Performed by: INTERNAL MEDICINE

## 2025-02-16 PROCEDURE — G0378 HOSPITAL OBSERVATION PER HR: HCPCS

## 2025-02-16 PROCEDURE — 87181 SC STD AGAR DILUTION PER AGT: CPT | Performed by: EMERGENCY MEDICINE

## 2025-02-16 PROCEDURE — 99285 EMERGENCY DEPT VISIT HI MDM: CPT | Mod: 25

## 2025-02-16 PROCEDURE — 80053 COMPREHEN METABOLIC PANEL: CPT | Performed by: EMERGENCY MEDICINE

## 2025-02-16 PROCEDURE — 71046 X-RAY EXAM CHEST 2 VIEWS: CPT

## 2025-02-16 PROCEDURE — 83605 ASSAY OF LACTIC ACID: CPT

## 2025-02-16 PROCEDURE — 36415 COLL VENOUS BLD VENIPUNCTURE: CPT | Performed by: EMERGENCY MEDICINE

## 2025-02-16 PROCEDURE — 82803 BLOOD GASES ANY COMBINATION: CPT

## 2025-02-16 PROCEDURE — 81001 URINALYSIS AUTO W/SCOPE: CPT | Performed by: EMERGENCY MEDICINE

## 2025-02-16 PROCEDURE — 87637 SARSCOV2&INF A&B&RSV AMP PRB: CPT | Performed by: EMERGENCY MEDICINE

## 2025-02-16 PROCEDURE — 120N000001 HC R&B MED SURG/OB

## 2025-02-16 PROCEDURE — 85025 COMPLETE CBC W/AUTO DIFF WBC: CPT | Performed by: EMERGENCY MEDICINE

## 2025-02-16 PROCEDURE — 85014 HEMATOCRIT: CPT | Performed by: EMERGENCY MEDICINE

## 2025-02-16 PROCEDURE — 87040 BLOOD CULTURE FOR BACTERIA: CPT | Performed by: EMERGENCY MEDICINE

## 2025-02-16 PROCEDURE — 36415 COLL VENOUS BLD VENIPUNCTURE: CPT | Performed by: INTERNAL MEDICINE

## 2025-02-16 PROCEDURE — 250N000011 HC RX IP 250 OP 636: Performed by: INTERNAL MEDICINE

## 2025-02-16 PROCEDURE — 258N000003 HC RX IP 258 OP 636: Performed by: EMERGENCY MEDICINE

## 2025-02-16 PROCEDURE — 84145 PROCALCITONIN (PCT): CPT | Performed by: INTERNAL MEDICINE

## 2025-02-16 PROCEDURE — 99222 1ST HOSP IP/OBS MODERATE 55: CPT | Performed by: INTERNAL MEDICINE

## 2025-02-16 PROCEDURE — 84300 ASSAY OF URINE SODIUM: CPT | Performed by: INTERNAL MEDICINE

## 2025-02-16 PROCEDURE — 87149 DNA/RNA DIRECT PROBE: CPT | Performed by: EMERGENCY MEDICINE

## 2025-02-16 PROCEDURE — 250N000013 HC RX MED GY IP 250 OP 250 PS 637: Performed by: EMERGENCY MEDICINE

## 2025-02-16 PROCEDURE — 83605 ASSAY OF LACTIC ACID: CPT | Performed by: INTERNAL MEDICINE

## 2025-02-16 PROCEDURE — 83690 ASSAY OF LIPASE: CPT | Performed by: EMERGENCY MEDICINE

## 2025-02-16 PROCEDURE — 74176 CT ABD & PELVIS W/O CONTRAST: CPT

## 2025-02-16 PROCEDURE — 82550 ASSAY OF CK (CPK): CPT | Performed by: EMERGENCY MEDICINE

## 2025-02-16 RX ORDER — ACETAMINOPHEN 325 MG/1
650 TABLET ORAL EVERY 4 HOURS PRN
Status: DISCONTINUED | OUTPATIENT
Start: 2025-02-16 | End: 2025-02-24 | Stop reason: HOSPADM

## 2025-02-16 RX ORDER — ENOXAPARIN SODIUM 100 MG/ML
40 INJECTION SUBCUTANEOUS EVERY 24 HOURS
Status: DISCONTINUED | OUTPATIENT
Start: 2025-02-17 | End: 2025-02-23

## 2025-02-16 RX ORDER — ACETAMINOPHEN 650 MG/1
650 SUPPOSITORY RECTAL EVERY 4 HOURS PRN
Status: DISCONTINUED | OUTPATIENT
Start: 2025-02-16 | End: 2025-02-24 | Stop reason: HOSPADM

## 2025-02-16 RX ORDER — ACETAMINOPHEN 500 MG
1000 TABLET ORAL ONCE
Status: COMPLETED | OUTPATIENT
Start: 2025-02-16 | End: 2025-02-16

## 2025-02-16 RX ORDER — CEFTRIAXONE 2 G/1
2 INJECTION, POWDER, FOR SOLUTION INTRAMUSCULAR; INTRAVENOUS EVERY 24 HOURS
Status: DISCONTINUED | OUTPATIENT
Start: 2025-02-16 | End: 2025-02-18

## 2025-02-16 RX ORDER — AMOXICILLIN 250 MG
1 CAPSULE ORAL 2 TIMES DAILY PRN
Status: DISCONTINUED | OUTPATIENT
Start: 2025-02-16 | End: 2025-02-24 | Stop reason: HOSPADM

## 2025-02-16 RX ORDER — ENOXAPARIN SODIUM 100 MG/ML
40 INJECTION SUBCUTANEOUS EVERY 24 HOURS
Status: DISCONTINUED | OUTPATIENT
Start: 2025-02-16 | End: 2025-02-16

## 2025-02-16 RX ORDER — AMOXICILLIN 250 MG
2 CAPSULE ORAL 2 TIMES DAILY PRN
Status: DISCONTINUED | OUTPATIENT
Start: 2025-02-16 | End: 2025-02-24 | Stop reason: HOSPADM

## 2025-02-16 RX ORDER — LIDOCAINE 40 MG/G
CREAM TOPICAL
Status: DISCONTINUED | OUTPATIENT
Start: 2025-02-16 | End: 2025-02-24 | Stop reason: HOSPADM

## 2025-02-16 RX ORDER — SODIUM CHLORIDE, SODIUM LACTATE, POTASSIUM CHLORIDE, CALCIUM CHLORIDE 600; 310; 30; 20 MG/100ML; MG/100ML; MG/100ML; MG/100ML
INJECTION, SOLUTION INTRAVENOUS CONTINUOUS
Status: ACTIVE | OUTPATIENT
Start: 2025-02-16 | End: 2025-02-17

## 2025-02-16 RX ADMIN — CEFTRIAXONE 2 G: 2 INJECTION, POWDER, FOR SOLUTION INTRAMUSCULAR; INTRAVENOUS at 23:53

## 2025-02-16 RX ADMIN — ACETAMINOPHEN 1000 MG: 500 TABLET, FILM COATED ORAL at 20:28

## 2025-02-16 RX ADMIN — SODIUM CHLORIDE 1000 ML: 9 INJECTION, SOLUTION INTRAVENOUS at 20:02

## 2025-02-16 RX ADMIN — SODIUM CHLORIDE 1000 ML: 9 INJECTION, SOLUTION INTRAVENOUS at 17:25

## 2025-02-16 ASSESSMENT — ACTIVITIES OF DAILY LIVING (ADL)
ADLS_ACUITY_SCORE: 41

## 2025-02-16 ASSESSMENT — COLUMBIA-SUICIDE SEVERITY RATING SCALE - C-SSRS
6. HAVE YOU EVER DONE ANYTHING, STARTED TO DO ANYTHING, OR PREPARED TO DO ANYTHING TO END YOUR LIFE?: NO
1. IN THE PAST MONTH, HAVE YOU WISHED YOU WERE DEAD OR WISHED YOU COULD GO TO SLEEP AND NOT WAKE UP?: NO
2. HAVE YOU ACTUALLY HAD ANY THOUGHTS OF KILLING YOURSELF IN THE PAST MONTH?: NO

## 2025-02-16 NOTE — ED PROVIDER NOTES
Emergency Department Note      History of Present Illness     Chief Complaint   Generalized Weakness      HPI   Victoriano Bourgeois is a 64 year old male with a history of hypertension who presents from Urgent Care for generalized weakness. Patient 's wife reports patient tested positive for Covid around 01/27. He started Paxlovid after 5 days of symptoms and symptoms seemed to be improving. Tuesday of this week, patient seemed to be having increased weakness which continued intermittently through the week. Friday, wife noted patient came home from work and patient did not have an appetite.  Saturday, patient woke up with chills and wife recorded temperature of 102.7 F around 5 pm and 103.7 around 3 pm today. Today patient continues to have difficulty eating due to reduced appetite. Wife notes she believes he is dehydrated. Patient endorses some swelling in right fingers, denies any pain or difficulty using fingers. He also endorses shortness of breath with exertion. Notes urine appears darker, otherwise denies urinary symptoms. Denies vomiting or diarrhea. Denies use of immunosuppressants. Denies history of DVT/PE. Wife denies any known infectious exposure.     Independent Historian   Wife as detailed above.    Review of External Notes   Urgent Care visit from earlier today reviewed. Patient seen for fatigue and fever. Referred to ED for sepsis work up.   Past Medical History   Medical History and Problem List   STEMI   Hypothyroidism   Hyperparathyroidism   Parathyroidectomy   Hypertension   Dyslipidemia  CAD    Medications   Evolocumab  Repatha  Lovastatin   Omeprazole  Alirocumab  Aspirin 81 mg   Diltiazem  Levothyroxine  Losartan   Nitroglycerin     Surgical History   Parathyroidectomy   Coronary angioplasty with stent placement   Knee scope  Cholecystectomy   Tonsillectomy   2 cardiac stents   Physical Exam     Patient Vitals for the past 24 hrs:   BP Temp Temp src Pulse Resp SpO2 Height Weight   02/16/25 2117 --  "-- -- -- -- 95 % -- --   02/16/25 2035 -- 99.6  F (37.6  C) Oral -- -- -- -- --   02/16/25 2006 (!) 128/108 98.7  F (37.1  C) Oral 87 -- 98 % -- --   02/16/25 1710 93/61 -- -- 86 -- -- -- --   02/16/25 1659 94/68 98.8  F (37.1  C) Oral 99 24 99 % 1.803 m (5' 11\") 94.8 kg (208 lb 15.9 oz)     Physical Exam  Eyes:  Sclera white; Pupils are equal and round  ENT:    External ears and nares normal  CV:  Rate as above with regular rhythm   Resp:  Breath sounds clear and equal bilaterally    Non-labored, no retractions or accessory muscle use  GI:  Abdomen is soft, non-tender, non-distended    No rebound tenderness or peritoneal features  MS:  Moves all extremities, fingers examined, possible faint swelling of right fingers, not impacting mobility or ability to make a fist, perfusion normal  Skin:  Warm and dry  Neuro:  Speech is normal and fluent. No apparent deficit.    Diagnostics     Lab Results   Labs Ordered and Resulted from Time of ED Arrival to Time of ED Departure   COMPREHENSIVE METABOLIC PANEL - Abnormal       Result Value    Sodium 136      Potassium 4.0      Carbon Dioxide (CO2) 22      Anion Gap 17 (*)     Urea Nitrogen 25.0 (*)     Creatinine 2.03 (*)     GFR Estimate 36 (*)     Calcium 9.3      Chloride 97 (*)     Glucose 138 (*)     Alkaline Phosphatase 62      AST 54 (*)     ALT 53      Protein Total 7.5      Albumin 4.0      Bilirubin Total 0.7     CBC WITH PLATELETS AND DIFFERENTIAL - Abnormal    WBC Count 17.1 (*)     RBC Count 4.90      Hemoglobin 13.8      Hematocrit 41.7      MCV 85      MCH 28.2      MCHC 33.1      RDW 13.2      Platelet Count 303      % Neutrophils 89      % Lymphocytes 5      % Monocytes 4      % Eosinophils 0      % Basophils 0      % Immature Granulocytes 1      NRBCs per 100 WBC 0      Absolute Neutrophils 15.3 (*)     Absolute Lymphocytes 0.9      Absolute Monocytes 0.8      Absolute Eosinophils 0.0      Absolute Basophils 0.1      Absolute Immature Granulocytes 0.1      " Absolute NRBCs 0.0     ROUTINE UA WITH MICROSCOPIC REFLEX TO CULTURE - Abnormal    Color Urine Yellow      Appearance Urine Slightly Cloudy (*)     Glucose Urine Negative      Bilirubin Urine Small (*)     Ketones Urine Negative      Specific Gravity Urine 1.027      Blood Urine Negative      pH Urine 5.5      Protein Albumin Urine 70 (*)     Urobilinogen Urine 4.0 (*)     Nitrite Urine Negative      Leukocyte Esterase Urine Negative      Mucus Urine Present (*)     RBC Urine 3 (*)     WBC Urine 9 (*)     Hyaline Casts Urine 4 (*)    ISTAT GASES LACTATE VENOUS POCT - Abnormal    Lactic Acid POCT 2.2 (*)     Bicarbonate Venous POCT 25      O2 Sat, Venous POCT 15 (*)     pCO2 Venous POCT 49      pH Venous POCT 7.30 (*)     pO2 Venous POCT 14 (*)    LIPASE - Normal    Lipase 28     INFLUENZA A/B, RSV AND SARS-COV2 PCR - Normal    Influenza A PCR Negative      Influenza B PCR Negative      RSV PCR Negative      SARS CoV2 PCR Negative     CK TOTAL - Normal         BLOOD CULTURE   BLOOD CULTURE       Imaging   CT Abdomen Pelvis w/o Contrast   Final Result   IMPRESSION:    1.  Few tiny 1 to 2 mm stone noted within the1 right kidney. No hydronephrosis.   2.  Ill-defined soft tissue with inflammatory changes and air noted adjacent to the pancreatic head and second portion of the duodenum which could be related to duodenitis, pancreatitis, or possible malignancy. This area is poorly evaluated due to lack    of oral and IV contrast. Recommend CT or MRI of the pancreas for further evaluation.   3.  Low-attenuation lesion within the caudate lobe which is indeterminant. This can be followed up on subsequent imaging.         Chest XR,  PA & LAT   Final Result   IMPRESSION:       Heart size is normal. Lungs are clear bilaterally. Mediastinum and visualized bony structures are unremarkable.           EKG   None    Independent Interpretation   CXR - no effusion, pneumothorax, or pneumonia     ED Course      Medications  Administered   Medications   sodium chloride 0.9% BOLUS 1,000 mL (0 mLs Intravenous Stopped 2/16/25 1825)   sodium chloride 0.9% BOLUS 1,000 mL (1,000 mLs Intravenous $New Bag 2/16/25 2002)   acetaminophen (TYLENOL) tablet 1,000 mg (1,000 mg Oral $Given 2/16/25 2028)       Procedures   None     Discussion of Management   None    ED Course   ED Course as of 02/16/25 2314   Sun Feb 16, 2025   1711 I initially assessed the patient and obtained the history and physical exam.    2015 I rechecked and updated the patient. Discussed plan for admission.    2015 I realized that the add on lactic acid I ordered after handing the nurse the initial extra green top and culture bottles was not performed.  iStat lab requested.   2252 I spoke to Dr Lewis regarding admission   2312 I spoke with Dr Gooden, hospitalist, regarding admission as the night staff is going to admit the patient instead.       Additional Documentation  None    Medical Decision Making / Diagnosis     BENJAMIN   Victoriano Bourgeois is a 64 year old male presenting with fevers at home, weakness, and decreased oral intake concerning for associated sepsis.  White blood cell count is elevated.  However he does not have any symptoms of bacterial infection.  Pneumonia was considered after COVID however this was not supported on chest x-ray.  Rapid viral testing was negative.  He has no abdominal tenderness but with microscopic hematuria on his urine, the possibility of infected stone was considered.  CT obtained with focal abnormality around the duodenum versus pancreas.  None of the possibilities listed are treated with antibiotics.  TESSA was treated with IV fluids.  Suspect this is secondary to his decreased oral intake only eating 1 meal a day and not being as interested in fluids.  Dehydration could certainly be the cause of his elevated lactic acid.  At this point and is uncertain if his fever is bacterial or viral but with no identified bacterial infection or symptoms to  point towards one, I do not feel that antibiotics are appropriate at this point in time.  Admission arranged for renal support.  Initiate antibiotics if cultures are positive.    Disposition   The patient was admitted to the hospital.     Diagnosis     ICD-10-CM    1. Fever in adult  R50.9       2. Leukocytosis, unspecified type  D72.829       3. Acute kidney injury  N17.9       4. Decreased appetite  R63.0            Scribe Disclosure:  I, Hilda Espinoza, am serving as a scribe at 5:11 PM on 2/16/2025 to document services personally performed by Liane Melo MD based on my observations and the provider's statements to me.        Liane Melo MD  02/16/25 [id number]

## 2025-02-16 NOTE — ED TRIAGE NOTES
"Arrives from home for 3 weeks of fever, weakness, and lethargy. Was covid positive two weeks ago and took a course of paxlovid. Was seen in  today and sent to the ED for a sepsis workup.    BP 94/68   Pulse 99   Temp 98.8  F (37.1  C) (Oral)   Resp 24   Ht 1.803 m (5' 11\")   Wt 94.8 kg (208 lb 15.9 oz)   SpO2 99%   BMI 29.15 kg/m         Triage Assessment (Adult)       Row Name 02/16/25 3580          Triage Assessment    Airway WDL WDL        Respiratory WDL    Respiratory WDL cough        Cardiac WDL    Cardiac WDL WDL        Cognitive/Neuro/Behavioral WDL    Cognitive/Neuro/Behavioral WDL WDL                     "

## 2025-02-17 ENCOUNTER — APPOINTMENT (OUTPATIENT)
Dept: MRI IMAGING | Facility: CLINIC | Age: 65
DRG: 871 | End: 2025-02-17
Attending: INTERNAL MEDICINE
Payer: MEDICARE

## 2025-02-17 PROBLEM — A41.9 SEPSIS (H): Status: ACTIVE | Noted: 2025-02-17

## 2025-02-17 LAB
ALBUMIN SERPL BCG-MCNC: 3 G/DL (ref 3.5–5.2)
ALP SERPL-CCNC: 48 U/L (ref 40–150)
ALT SERPL W P-5'-P-CCNC: 54 U/L (ref 0–70)
ANION GAP SERPL CALCULATED.3IONS-SCNC: 12 MMOL/L (ref 7–15)
AST SERPL W P-5'-P-CCNC: 61 U/L (ref 0–45)
BILIRUB SERPL-MCNC: 0.3 MG/DL
BUN SERPL-MCNC: 25.1 MG/DL (ref 8–23)
CALCIUM SERPL-MCNC: 8.1 MG/DL (ref 8.8–10.4)
CHLORIDE SERPL-SCNC: 101 MMOL/L (ref 98–107)
CREAT SERPL-MCNC: 1.17 MG/DL (ref 0.67–1.17)
CREAT SERPL-MCNC: 1.44 MG/DL (ref 0.67–1.17)
CREAT UR-MCNC: 210.5 MG/DL
CRP SERPL-MCNC: 172.6 MG/L
EGFRCR SERPLBLD CKD-EPI 2021: 54 ML/MIN/1.73M2
EGFRCR SERPLBLD CKD-EPI 2021: 69 ML/MIN/1.73M2
ERYTHROCYTE [DISTWIDTH] IN BLOOD BY AUTOMATED COUNT: 13.2 % (ref 10–15)
FRACT EXCRET NA UR+SERPL-RTO: 0.2 %
GLUCOSE SERPL-MCNC: 128 MG/DL (ref 70–99)
HCO3 SERPL-SCNC: 20 MMOL/L (ref 22–29)
HCT VFR BLD AUTO: 34.5 % (ref 40–53)
HGB BLD-MCNC: 11.4 G/DL (ref 13.3–17.7)
INR PPP: 1.36 (ref 0.85–1.15)
LACTATE SERPL-SCNC: 0.9 MMOL/L (ref 0.7–2)
MCH RBC QN AUTO: 28 PG (ref 26.5–33)
MCHC RBC AUTO-ENTMCNC: 33 G/DL (ref 31.5–36.5)
MCV RBC AUTO: 85 FL (ref 78–100)
PLATELET # BLD AUTO: 232 10E3/UL (ref 150–450)
POTASSIUM SERPL-SCNC: 3.8 MMOL/L (ref 3.4–5.3)
PROCALCITONIN SERPL IA-MCNC: 0.45 NG/ML
PROT SERPL-MCNC: 5.9 G/DL (ref 6.4–8.3)
RBC # BLD AUTO: 4.07 10E6/UL (ref 4.4–5.9)
SODIUM SERPL-SCNC: 133 MMOL/L (ref 135–145)
SODIUM SERPL-SCNC: 134 MMOL/L (ref 135–145)
SODIUM UR-SCNC: 43 MMOL/L
SODIUM UR-SCNC: <20 MMOL/L
TSH SERPL DL<=0.005 MIU/L-ACNC: 2.5 UIU/ML (ref 0.3–4.2)
WBC # BLD AUTO: 10.1 10E3/UL (ref 4–11)

## 2025-02-17 PROCEDURE — 84300 ASSAY OF URINE SODIUM: CPT | Performed by: PHYSICIAN ASSISTANT

## 2025-02-17 PROCEDURE — A9585 GADOBUTROL INJECTION: HCPCS | Performed by: INTERNAL MEDICINE

## 2025-02-17 PROCEDURE — 258N000003 HC RX IP 258 OP 636: Performed by: INTERNAL MEDICINE

## 2025-02-17 PROCEDURE — 82565 ASSAY OF CREATININE: CPT | Performed by: PHYSICIAN ASSISTANT

## 2025-02-17 PROCEDURE — 82040 ASSAY OF SERUM ALBUMIN: CPT | Performed by: INTERNAL MEDICINE

## 2025-02-17 PROCEDURE — 80053 COMPREHEN METABOLIC PANEL: CPT | Performed by: INTERNAL MEDICINE

## 2025-02-17 PROCEDURE — 250N000013 HC RX MED GY IP 250 OP 250 PS 637: Performed by: INTERNAL MEDICINE

## 2025-02-17 PROCEDURE — 82570 ASSAY OF URINE CREATININE: CPT | Performed by: PHYSICIAN ASSISTANT

## 2025-02-17 PROCEDURE — 120N000001 HC R&B MED SURG/OB

## 2025-02-17 PROCEDURE — 36415 COLL VENOUS BLD VENIPUNCTURE: CPT | Performed by: PHYSICIAN ASSISTANT

## 2025-02-17 PROCEDURE — 85027 COMPLETE CBC AUTOMATED: CPT | Performed by: INTERNAL MEDICINE

## 2025-02-17 PROCEDURE — 74183 MRI ABD W/O CNTR FLWD CNTR: CPT

## 2025-02-17 PROCEDURE — 250N000013 HC RX MED GY IP 250 OP 250 PS 637: Performed by: PHYSICIAN ASSISTANT

## 2025-02-17 PROCEDURE — 84295 ASSAY OF SERUM SODIUM: CPT | Performed by: PHYSICIAN ASSISTANT

## 2025-02-17 PROCEDURE — 85610 PROTHROMBIN TIME: CPT | Performed by: INTERNAL MEDICINE

## 2025-02-17 PROCEDURE — 99418 PROLNG IP/OBS E/M EA 15 MIN: CPT | Performed by: PHYSICIAN ASSISTANT

## 2025-02-17 PROCEDURE — 99233 SBSQ HOSP IP/OBS HIGH 50: CPT | Performed by: PHYSICIAN ASSISTANT

## 2025-02-17 PROCEDURE — 250N000011 HC RX IP 250 OP 636: Performed by: INTERNAL MEDICINE

## 2025-02-17 PROCEDURE — 255N000002 HC RX 255 OP 636: Performed by: INTERNAL MEDICINE

## 2025-02-17 PROCEDURE — 36415 COLL VENOUS BLD VENIPUNCTURE: CPT | Performed by: INTERNAL MEDICINE

## 2025-02-17 PROCEDURE — 250N000011 HC RX IP 250 OP 636: Performed by: PHYSICIAN ASSISTANT

## 2025-02-17 RX ORDER — ASPIRIN 81 MG/1
81 TABLET ORAL DAILY
Status: DISCONTINUED | OUTPATIENT
Start: 2025-02-17 | End: 2025-02-24 | Stop reason: HOSPADM

## 2025-02-17 RX ORDER — DILTIAZEM HYDROCHLORIDE 120 MG/1
240 CAPSULE, COATED, EXTENDED RELEASE ORAL DAILY
Status: DISCONTINUED | OUTPATIENT
Start: 2025-02-17 | End: 2025-02-21

## 2025-02-17 RX ORDER — LEVOTHYROXINE SODIUM 75 UG/1
75 TABLET ORAL DAILY
Status: DISCONTINUED | OUTPATIENT
Start: 2025-02-17 | End: 2025-02-17

## 2025-02-17 RX ORDER — LEVOTHYROXINE SODIUM 50 UG/1
50 TABLET ORAL
COMMUNITY

## 2025-02-17 RX ORDER — CETIRIZINE HYDROCHLORIDE 10 MG/1
10 TABLET ORAL DAILY PRN
COMMUNITY

## 2025-02-17 RX ORDER — GADOBUTROL 604.72 MG/ML
10 INJECTION INTRAVENOUS ONCE
Status: COMPLETED | OUTPATIENT
Start: 2025-02-17 | End: 2025-02-17

## 2025-02-17 RX ORDER — LOSARTAN POTASSIUM 100 MG/1
100 TABLET ORAL DAILY
Status: DISCONTINUED | OUTPATIENT
Start: 2025-02-17 | End: 2025-02-24 | Stop reason: HOSPADM

## 2025-02-17 RX ORDER — DILTIAZEM HYDROCHLORIDE 240 MG/1
240 CAPSULE, EXTENDED RELEASE ORAL DAILY
Status: ON HOLD | COMMUNITY
End: 2025-02-24

## 2025-02-17 RX ORDER — LEVOTHYROXINE SODIUM 50 UG/1
50 TABLET ORAL DAILY
Status: DISCONTINUED | OUTPATIENT
Start: 2025-02-17 | End: 2025-02-24 | Stop reason: HOSPADM

## 2025-02-17 RX ORDER — OMEPRAZOLE 20 MG/1
20 CAPSULE, DELAYED RELEASE ORAL DAILY PRN
Status: ON HOLD | COMMUNITY
End: 2025-02-24

## 2025-02-17 RX ADMIN — ENOXAPARIN SODIUM 40 MG: 40 INJECTION SUBCUTANEOUS at 11:54

## 2025-02-17 RX ADMIN — DILTIAZEM HYDROCHLORIDE 240 MG: 120 CAPSULE, COATED, EXTENDED RELEASE ORAL at 11:52

## 2025-02-17 RX ADMIN — ACETAMINOPHEN 650 MG: 325 TABLET, FILM COATED ORAL at 19:06

## 2025-02-17 RX ADMIN — SODIUM CHLORIDE, POTASSIUM CHLORIDE, SODIUM LACTATE AND CALCIUM CHLORIDE: 600; 310; 30; 20 INJECTION, SOLUTION INTRAVENOUS at 08:30

## 2025-02-17 RX ADMIN — SODIUM CHLORIDE, POTASSIUM CHLORIDE, SODIUM LACTATE AND CALCIUM CHLORIDE: 600; 310; 30; 20 INJECTION, SOLUTION INTRAVENOUS at 00:24

## 2025-02-17 RX ADMIN — LEVOTHYROXINE SODIUM 50 MCG: 0.05 TABLET ORAL at 11:52

## 2025-02-17 RX ADMIN — SODIUM CHLORIDE, POTASSIUM CHLORIDE, SODIUM LACTATE AND CALCIUM CHLORIDE: 600; 310; 30; 20 INJECTION, SOLUTION INTRAVENOUS at 19:52

## 2025-02-17 RX ADMIN — ACETAMINOPHEN 650 MG: 325 TABLET, FILM COATED ORAL at 11:54

## 2025-02-17 RX ADMIN — GADOBUTROL 10 ML: 604.72 INJECTION INTRAVENOUS at 14:55

## 2025-02-17 RX ADMIN — CEFTRIAXONE 2 G: 2 INJECTION, POWDER, FOR SOLUTION INTRAMUSCULAR; INTRAVENOUS at 23:10

## 2025-02-17 RX ADMIN — ACETAMINOPHEN 650 MG: 325 TABLET, FILM COATED ORAL at 04:48

## 2025-02-17 ASSESSMENT — ACTIVITIES OF DAILY LIVING (ADL)
ADLS_ACUITY_SCORE: 35
ADLS_ACUITY_SCORE: 44
ADLS_ACUITY_SCORE: 44
ADLS_ACUITY_SCORE: 35
ADLS_ACUITY_SCORE: 36
ADLS_ACUITY_SCORE: 35
ADLS_ACUITY_SCORE: 44
ADLS_ACUITY_SCORE: 35
ADLS_ACUITY_SCORE: 44
ADLS_ACUITY_SCORE: 35
ADLS_ACUITY_SCORE: 36
ADLS_ACUITY_SCORE: 35

## 2025-02-17 NOTE — ED NOTES
[facility]  ED Nurse Handoff Report    ED Chief complaint: Generalized Weakness  . ED Diagnosis:   Final diagnoses:   Fever in adult   Leukocytosis, unspecified type   Acute kidney injury   Decreased appetite   Abnormal CT of the abdomen - duodenitis vs pancreatitis vs mass       Allergies:   Allergies   Allergen Reactions    Chlorthalidone Muscle Pain (Myalgia)     Cramping severe    Metoprolol Other (See Comments)     Bloating, weight gain, heart burn.    Amlodipine Rash    Carvedilol Other (See Comments)     Wt gain, bloating, loss of appetite    Isosorbide Nitrate Headache    Codeine Unknown     Other Reaction(s): *Unknown    Statins Muscle Pain (Myalgia)    Atorvastatin Muscle Pain (Myalgia)       Code Status: Full Code    Activity level - Baseline/Home:  independent.  Activity Level - Current:   independent.   Lift room needed: No.   Bariatric: No   Needed: No   Isolation: No.   Infection: Not Applicable.     Respiratory status: Room air    Vital Signs (within 30 minutes):   Vitals:    02/16/25 1710 02/16/25 2006 02/16/25 2035 02/16/25 2117   BP: 93/61 (!) 128/108     Pulse: 86 87     Resp:       Temp:  98.7  F (37.1  C) 99.6  F (37.6  C)    TempSrc:  Oral Oral    SpO2:  98%  95%   Weight:       Height:           Cardiac Rhythm:  ,      Pain level:    Patient confused: No.   Patient Falls Risk: patient and family education.   Elimination Status: Has voided     Patient Report - Initial Complaint:   Arrives from home for 3 weeks of fever, weakness, and lethargy. Was covid positive two weeks ago and took a course of paxlovid. Was seen in  today and sent to the ED for a sepsis workup.      .   Focused Assessment: Low grade fever, chills     Abnormal Results:   Labs Ordered and Resulted from Time of ED Arrival to Time of ED Departure   COMPREHENSIVE METABOLIC PANEL - Abnormal       Result Value    Sodium 136      Potassium 4.0      Carbon Dioxide (CO2) 22      Anion Gap 17 (*)      Urea Nitrogen 25.0 (*)     Creatinine 2.03 (*)     GFR Estimate 36 (*)     Calcium 9.3      Chloride 97 (*)     Glucose 138 (*)     Alkaline Phosphatase 62      AST 54 (*)     ALT 53      Protein Total 7.5      Albumin 4.0      Bilirubin Total 0.7     CBC WITH PLATELETS AND DIFFERENTIAL - Abnormal    WBC Count 17.1 (*)     RBC Count 4.90      Hemoglobin 13.8      Hematocrit 41.7      MCV 85      MCH 28.2      MCHC 33.1      RDW 13.2      Platelet Count 303      % Neutrophils 89      % Lymphocytes 5      % Monocytes 4      % Eosinophils 0      % Basophils 0      % Immature Granulocytes 1      NRBCs per 100 WBC 0      Absolute Neutrophils 15.3 (*)     Absolute Lymphocytes 0.9      Absolute Monocytes 0.8      Absolute Eosinophils 0.0      Absolute Basophils 0.1      Absolute Immature Granulocytes 0.1      Absolute NRBCs 0.0     ROUTINE UA WITH MICROSCOPIC REFLEX TO CULTURE - Abnormal    Color Urine Yellow      Appearance Urine Slightly Cloudy (*)     Glucose Urine Negative      Bilirubin Urine Small (*)     Ketones Urine Negative      Specific Gravity Urine 1.027      Blood Urine Negative      pH Urine 5.5      Protein Albumin Urine 70 (*)     Urobilinogen Urine 4.0 (*)     Nitrite Urine Negative      Leukocyte Esterase Urine Negative      Mucus Urine Present (*)     RBC Urine 3 (*)     WBC Urine 9 (*)     Hyaline Casts Urine 4 (*)    ISTAT GASES LACTATE VENOUS POCT - Abnormal    Lactic Acid POCT 2.2 (*)     Bicarbonate Venous POCT 25      O2 Sat, Venous POCT 15 (*)     pCO2 Venous POCT 49      pH Venous POCT 7.30 (*)     pO2 Venous POCT 14 (*)    LIPASE - Normal    Lipase 28     INFLUENZA A/B, RSV AND SARS-COV2 PCR - Normal    Influenza A PCR Negative      Influenza B PCR Negative      RSV PCR Negative      SARS CoV2 PCR Negative     CK TOTAL - Normal         LACTIC ACID WHOLE BLOOD   BLOOD CULTURE   BLOOD CULTURE        CT Abdomen Pelvis w/o Contrast   Final Result   IMPRESSION:    1.  Few tiny 1 to 2 mm  stone noted within the1 right kidney. No hydronephrosis.   2.  Ill-defined soft tissue with inflammatory changes and air noted adjacent to the pancreatic head and second portion of the duodenum which could be related to duodenitis, pancreatitis, or possible malignancy. This area is poorly evaluated due to lack    of oral and IV contrast. Recommend CT or MRI of the pancreas for further evaluation.   3.  Low-attenuation lesion within the caudate lobe which is indeterminant. This can be followed up on subsequent imaging.         Chest XR,  PA & LAT   Final Result   IMPRESSION:       Heart size is normal. Lungs are clear bilaterally. Mediastinum and visualized bony structures are unremarkable.           Treatments provided: IV fluids  Family Comments: Wife at bedside  OBS brochure/video discussed/provided to patient:  Yes  ED Medications:   Medications   sodium chloride 0.9% BOLUS 1,000 mL (0 mLs Intravenous Stopped 2/16/25 1825)   sodium chloride 0.9% BOLUS 1,000 mL (1,000 mLs Intravenous $New Bag 2/16/25 2002)   acetaminophen (TYLENOL) tablet 1,000 mg (1,000 mg Oral $Given 2/16/25 2028)       Drips infusing:  No  For the majority of the shift this patient was Green.   Interventions performed were NA.    Sepsis treatment initiated: No    Cares/treatment/interventions/medications to be completed following ED care: See admission orders    ED Nurse Name: Georgette Deal RN  11:00 PM   RECEIVING UNIT ED HANDOFF REVIEW    Above ED Nurse Handoff Report was reviewed: Yes  Reviewed by: Maria Luz Acuna RN on February 16, 2025 at 11:58 PM   ADRIEL Richey called the ED to inform them the note was read: Yes

## 2025-02-17 NOTE — PLAN OF CARE
ROOM # 212-1    Living Situation (if not independent, order SW consult): Lives with girlfriend  Facility name:  : [facility]    Activity level at baseline: Ind  Activity level on admit: Ind    Who will be transporting you at discharge: Family    Patient registered to observation; given Patient Bill of Rights; given the opportunity to ask questions about observation status and their plan of care.  Patient has been oriented to the observation room, bathroom and call light is in place.    Discussed discharge goals and expectations with patient/family.

## 2025-02-17 NOTE — PLAN OF CARE
PRIMARY DIAGNOSIS:  Severe Sepsis  OUTPATIENT/OBSERVATION GOALS TO BE MET BEFORE DISCHARGE:  ADLs back to baseline: Yes    Activity and level of assistance: Up with standby assistance.    Pain status: Pain free.    Return to near baseline physical activity: Yes     Discharge Planner Nurse   Safe discharge environment identified: Yes  Barriers to discharge: Yes       Entered by: Maria Luz Acuna RN 02/17/2025 4:56 AM     Alert and oriented. Denies SOB or pain. IVF LR 100ml/hr ongoing. Plan MR Abdomen MRCP today.     Please review provider order for any additional goals.   Nurse to notify provider when observation goals have been met and patient is ready for discharge.    Goal Outcome Evaluation:      Plan of Care Reviewed With: patient    Overall Patient Progress: no changeOverall Patient Progress: no change    Outcome Evaluation: had T 103 F

## 2025-02-17 NOTE — PLAN OF CARE
Pt A+Ox4, coop, pleasant. VSS, on RA, denies pain. Temp elevated - Tylenol PRN given,.Awaiting MRI, checklist completed. Family at bedside.    Will continue to monitor closely.     Parker Olivas RN on 2/17/2025 at 1:03 PM      Problem: Adult Inpatient Plan of Care  Goal: Absence of Hospital-Acquired Illness or Injury  Outcome: Progressing  Intervention: Prevent Skin Injury  Recent Flowsheet Documentation  Taken 2/17/2025 0830 by Parker Olivas RN  Body Position: position changed independently  Intervention: Prevent Infection  Recent Flowsheet Documentation  Taken 2/17/2025 0830 by Parker Olivas, RN  Infection Prevention: rest/sleep promoted  Goal: Optimal Comfort and Wellbeing  Outcome: Progressing   Goal Outcome Evaluation:

## 2025-02-17 NOTE — PHARMACY-ADMISSION MEDICATION HISTORY
Pharmacist Admission Medication History    Admission medication history is complete. The information provided in this note is only as accurate as the sources available at the time of the update.    Information Source(s): Patient, Clinic records, [facility], and Pt's own med list  via in-person    Pertinent Information:      Changes made to PTA medication list:  Added: REpatha, Zyrtec & Omeprazole  Deleted: Praluent, Zofran & Oxycodone.  Changed: Synthroid 100mcg-->50mcg daily    Allergies reviewed with patient and updates made in EHR: yes    Medication History Completed By: Keyanna Albarran PharmD 2/17/2025 10:54 AM    PTA Med List   Medication Sig Last Dose/Taking    cetirizine (ZYRTEC) 10 MG tablet Take 10 mg by mouth daily as needed for allergies. Taking As Needed    diltiazem ER (DILT-XR) 240 MG 24 hr ER beaded capsule Take 240 mg by mouth daily. 2/16/2025 Morning    evolocumab (REPATHA) 140 MG/ML prefilled autoinjector Inject 140 mg subcutaneously every 14 days. 2/10/2025    levothyroxine (SYNTHROID/LEVOTHROID) 50 MCG tablet Take 50 mcg by mouth every morning (before breakfast). 2/16/2025 Morning    losartan (COZAAR) 100 MG tablet Take 100 mg by mouth daily 2/16/2025 Morning    nitroGLYcerin (NITROSTAT) 0.4 MG sublingual tablet Place 0.4 mg under the tongue as needed Taking As Needed    omeprazole (PRILOSEC) 20 MG DR capsule Take 20 mg by mouth daily as needed. Taking As Needed

## 2025-02-17 NOTE — PROGRESS NOTES
Caller: Taryn Weber    Relationship: Mother    Best call back number: [phone]     What is the best time to reach you: ANYTIME     Who are you requesting to speak with (clinical staff, provider,  specific staff member): CLINICAL     Do you know the name of the person who called: JOHN     What was the call regarding: MOM RETURNING A CALL TO JOHN     Is it okay if the provider responds through MyChart: PHONE CALL          [facility]    Medicine Progress Note - Hospitalist Service    Date of Admission:  2/16/2025    Assessment & Plan      Victoriano Bourgeois is a 64 year old male admitted on 2/16/2025. He has PMHx most notable for HTN, HLD, STEMI, CAD s/p PCI x 2, and hypothyroidism that presents with reported fever, chills and has findings concerning for sepsis of unknown source and acute renal failure secondary to dehydration.    Suspected severe sepsis, unclear source  Fever  Anion gap metabolic acidosis  Nonspecific lesion in the caudate lobe of the liver  Nonspecific inflammation about the duodenum/pancreatic head  Patient presents with fever and chills, fatigue and a few episodes of diarrhea. Also notes decreased appetite.   Recurrent fever here up to 103. VS otherwise stable. ED work up concerning for TESSA with Cr of 2.0, CRP elevated at 172, WBC elevated at 17.1, anion gap 17. Lactic acid 2.2. Procalcitonin 0.45.  Meets SIRS criteria without definitive source. Blood cultures negative to date.   He has no headache or neck pain/stiffness to suggest meningitis.  No respiratory symptoms to suggest pneumonia and no findings on chest x-ray.  He has no GI or urinary symptoms.  No arthralgia to suggest septic arthritis.  No rash to suggest skin or soft tissue infection.  No history of serious bacterial infection requiring hospitalization.  CT abd/pelvis shows ill-defined soft tissue with inflammatory changes and air noted adjacent to pancreatic head and second portion of duodenum which could be related duodenitis, pancreatitis or malignancy.   Low suspicion for pancreatitis given normal lipase  - continue Rocephin 2 g every 24 hours  - MRCP to further assess soft tissue inflammation noted on CT and liver lesion  - Follow blood cultures, negative to date  - urine culture negative today  - TSH normal at 2.5  - INR was added on, 1.36. Not on anticoagulation, may indicate liver dysfunction  - anion gap resolved    Acute  renal failure on chronic kidney disease II  Likely due to dehydration and poor p.o. intake.  Presents with creatinine 2.03 and elevated BUN.  Cr improved to 1.44 after IVFs.  - Baseline creatinine 1.2-1.3  - urine sodium <20, FeNa pending  - CT abdomen pelvis with no hydronephrosis  - s/p 2 L IVF in the ED, will continue LR at 100 mL/hour for 1 day  - Recheck creatinine in a.m.  - continue to hold PTA losartan, consider resuming in AM if Cr improves    Pyuria  - UA with 9 WBCs, 3 RBCs, negative LE, nitrites  - The patient has no urinary symptoms  - urine culture negative to date  - Getting Rocephin as above    HTN  HLD  CAD s/p PCI x 2 in 2019  History of STEMI in 2019  The patient presented to [facility] in 2019 with chest pain.  Was found to have inferior STEMI and had stents placed in the mid circumflex and obtuse marginal artery  - Continue PTA diltiazem and ASA 81 mg  - The patient is intolerant of statins  - Is on alirocumab every 2 weeks, resume on discharge    Hypothyroidism  - Continue PTA Synthroid    Hyponatremia  Mild, 133.   - monitor        Diet: Clear Liquid Diet    DVT Prophylaxis: Pneumatic Compression Devices  Myrick Catheter: Not present  Lines: None     Cardiac Monitoring: None  Code Status: Full Code      Clinically Significant Risk Factors Present on Admission         # Hyponatremia: Lowest Na = 133 mmol/L in last 2 days, will monitor as appropriate  # Hypochloremia: Lowest Cl = 97 mmol/L in last 2 days, will monitor as appropriate      # Hypoalbuminemia: Lowest albumin = 3 g/dL at 2/17/2025  5:39 AM, will monitor as appropriate  # Coagulation Defect: INR = 1.36 (Ref range: 0.85 - 1.15) and/or PTT = N/A, will monitor for bleeding  # Drug Induced Platelet Defect: home medication list includes an antiplatelet medication   # Hypertension: Home medication list includes antihypertensive(s)           # Overweight: Estimated body mass index is 29.54 kg/m  as calculated from the following:     "Height as of this encounter: 1.803 m (5' 11\").    Weight as of this encounter: 96.1 kg (211 lb 12.8 oz).              Social Drivers of Health    Tobacco Use: Medium Risk (2/16/2025)    Received from [employer]    Patient History     Smoking Tobacco Use: Former     Smokeless Tobacco Use: Never    Received from [facility], [employer] [facility]    Social Connections          Disposition Plan     Medically Ready for Discharge: Anticipated in 2-4 Days           The patient's care was discussed with the Bedside Nurse, Patient, and Patient's Family.    Zenaida Napoles PA-C  Hospitalist Service  [facility]  Securely message with [employer] (more info)  Text page via [facility] Paging/Directory   ______________________________________________________________________    Interval History   Only complains of fever and chills, denies chest pain, SOB, abd pain, nausea, vomiting or dysuria. Does note a few episodes of diarrhea. No recent surgeries or procedures, lap cholecystectomy a year ago    Physical Exam   Vital Signs: Temp: (!) 103.1  F (39.5  C) Temp src: Oral BP: (!) 145/85 Pulse: 98   Resp: 16 SpO2: 95 % O2 Device: None (Room air)    Weight: 211 lbs 12.8 oz    GENERAL:  Comfortable.  PSYCH: pleasant, oriented, No acute distress.  HEART:  Normal S1, S2 with no murmur, no pericardial rub, gallops or S3 or S4.  LUNGS:  Clear to auscultation, normal Respiratory effort. No wheezing, rales or ronchi.  GI:  Soft, normal bowel sounds. Non-tender, non distended.   EXTREMITIES:  No pedal edema, +2 pulses bilateral and equal.  SKIN:  Dry to touch, No rash, wound or ulcerations.  NEUROLOGIC:  grossly intact      Medical Decision Making       65 MINUTES SPENT BY ME on the date of service doing chart review, history, exam, documentation & further activities per the note.      Data     I have personally reviewed the following data over the past 24 " hrs:    10.1  \   11.4 (L)   / 232     133 (L) 101 25.1 (H) /  128 (H)   3.8 20 (L) 1.44 (H) \     ALT: 54 AST: 61 (H) AP: 48 TBILI: 0.3   ALB: 3.0 (L) TOT PROTEIN: 5.9 (L) LIPASE: 28     TSH: 2.50 T4: N/A A1C: N/A     Procal: 0.45 CRP: 172.60 (H) Lactic Acid: 0.9       INR:  1.36 (H) PTT:  N/A   D-dimer:  N/A Fibrinogen:  N/A       Imaging results reviewed over the past 24 hrs:   Recent Results (from the past 24 hours)   Chest XR,  PA & LAT    Narrative    EXAM: XR CHEST 2 VIEWS  LOCATION: [facility]  DATE: 2/16/2025    INDICATION: fever, elevated wbc, recent COVID  COMPARISON: None.      Impression    IMPRESSION:     Heart size is normal. Lungs are clear bilaterally. Mediastinum and visualized bony structures are unremarkable.    CT Abdomen Pelvis w/o Contrast    Narrative    EXAM: CT ABDOMEN PELVIS W/O CONTRAST  LOCATION: [facility]  DATE: 2/16/2025    INDICATION: sepsis (fever, WBC), TESSA, microscopic hematuria  COMPARISON: None.  TECHNIQUE: CT scan of the abdomen and pelvis was performed without IV contrast. Multiplanar reformats were obtained. Dose reduction techniques were used.  CONTRAST: None.    FINDINGS:   LOWER CHEST: Normal.    HEPATOBILIARY: Low-attenuation lesion within the caudate lobe measuring 3 x 3 cm which is indeterminant. Cholecystectomy.    PANCREAS: Within the pancreatic head and second portion of the duodenum, there is ill-defined soft tissue change and small foci of air is difficult to tell whether this is within bowel or within the soft tissues.    SPLEEN: Normal.    ADRENAL GLANDS: Normal.    KIDNEYS/BLADDER: 2 small stones measuring 2 mm are noted within the right kidney. Kidneys otherwise unremarkable with no evidence of hydronephrosis.    BOWEL: Diverticulosis of the colon. No obstruction.     LYMPH NODES: Normal.    VASCULATURE: Mild atherosclerotic disease of the abdominal aorta and its branches. Small fat-containing left inguinal  hernia.    PELVIC ORGANS: Bladder within normal limits.    MUSCULOSKELETAL: Normal.      Impression    IMPRESSION:   1.  Few tiny 1 to 2 mm stone noted within the1 right kidney. No hydronephrosis.  2.  Ill-defined soft tissue with inflammatory changes and air noted adjacent to the pancreatic head and second portion of the duodenum which could be related to duodenitis, pancreatitis, or possible malignancy. This area is poorly evaluated due to lack   of oral and IV contrast. Recommend CT or MRI of the pancreas for further evaluation.  3.  Low-attenuation lesion within the caudate lobe which is indeterminant. This can be followed up on subsequent imaging.

## 2025-02-17 NOTE — H&P
[facility]    History and Physical - Hospitalist Service       Date of Admission:  2/16/2025    Assessment & Plan      Victoriano Bourgeois is a 64 year old male admitted on 2/16/2025. He has PMH most notable for HTN, HLD, STEMI, CAD s/p PCI x 2, hypothyroidism that presents with reported fever, chills and has findings concerning for sepsis of unknown source and acute renal failure secondary to dehydration.    Med rec not done at the time of admission.  Please reorder patient's PTA medications once med rec has been completed.    #Suspected severe sepsis, unclear source  The patient presents with a reported oral temp of 103 F and rigors.  Found to be afebrile, HR 80s, BP 90s/60s, 98% on RA with RR of 24.  Anion gap 17, bicarb 22, WBC 17.1 meeting SIRS criteria. Lactic acid 2.2.  VBG 7.3/49.  The patient's history of fever, rigors and objective findings of leukocytosis, elevated lactate are suspicious for bacteremia.  He has no headache or neck pain/stiffness to suggest meningitis.  No respiratory symptoms to suggest pneumonia and no findings on chest x-ray.  He has no GI or urinary symptoms.  No arthralgia to suggest septic arthritis.  No rash to suggest skin or soft tissue infection.  No history of serious bacterial infection requiring hospitalization.  -Start Rocephin 2 g every 24 hours  -Add on Pro-Luis/CRP  -Follow blood cultures  -Add on urine culture  -Check TSH    #Acute renal failure on chronic kidney disease II  -Likely due to dehydration and poor p.o. intake.  Presents with creatinine 2.03 and elevated BUN.  -Baseline creatinine 1.2-1.3  -Add on urine sodium, obtain FeNa  -CT abdomen pelvis with no hydronephrosis  -s/p 2 L IVF in the ED, will continue LR at 100 mL/hour for 1 day  -Recheck creatinine in a.m.  -Hold PTA losartan    #Nonspecific lesion in the caudate lobe of the liver  #Nonspecific inflammation about the duodenum/pancreatic head  -Noted on CT abdomen pelvis without contrast,  "the patient has no GI symptoms  -Lipase is normal  -Given his history of decreased p.o. intake and possible malignancy will pursue MRCP in the a.m.    #Anion gap metabolic acidosis  -Likely due to elevated lactate, s/p 2 L IVF, recheck lactate and BMP    #Pyuria  -UA with 9 WBCs, 3 RBCs, negative LE, nitrites  -The patient has no urinary symptoms  -Added on urine culture  -Getting Rocephin as above    #HTN  #HLD  #CAD s/p PCI x 2 in 2019  #History of STEMI in 2019  The patient presented to [facility] in 2019 with chest pain.  Was found to have inferior STEMI and had stents placed in the mid circumflex and obtuse marginal artery  -Continue PTA diltiazem and ASA 81 mg  -The patient is intolerant of statins  -Is on alirocumab every 2 weeks    #Hypothyroidism  -Continue PTA Synthroid        Diet:  Clear liquid diet for now, s/p 2 L IVF in the ED, continue LR at 100 mL/hour for 1 day  DVT Prophylaxis: Enoxaparin (Lovenox) SQ  Myrick Catheter: Not present  Lines: None     Cardiac Monitoring: None  Code Status:  Full code, discussed on admission    Clinically Significant Risk Factors Present on Admission          # Hypochloremia: Lowest Cl = 97 mmol/L in last 2 days, will monitor as appropriate        # Drug Induced Platelet Defect: home medication list includes an antiplatelet medication  # Acute Kidney Injury, unspecified: based on a >150% or 0.3 mg/dL increase in last creatinine compared to past 90 day average, will monitor renal function  # Hypertension: Home medication list includes antihypertensive(s)           # Overweight: Estimated body mass index is 29.15 kg/m  as calculated from the following:    Height as of this encounter: 1.803 m (5' 11\").    Weight as of this encounter: 94.8 kg (208 lb 15.9 oz).              Disposition Plan     Medically Ready for Discharge: Anticipated in 2-4 Days    This patient was originally supposed to be admitted as an observation admit however after receiving history of oral temp " "of 103 and rigors the admission was changed to inpatient due to concern of bacteremia.         Elías Chavarria MD  Hospitalist Service  [facility]  Securely message with [employer] (more info)  Text page via [facility] Paging/Directory     ______________________________________________________________________    Chief Complaint   Fever, chills, lack of appetite    History is obtained from the patient    History of Present Illness   Victoriano Bourgeois is a 64 year old male who has PMH most notable for HTN, HLD, CAD s/p PCI x 2, hypothyroidism that presents with fever, chills, lack of appetite.    The patient was diagnosed with COVID 3 weeks ago.  He states his symptoms had completely resolved when he \"had a relapse.\"  Over the last several days he has had decreased p.o. intake and severe chills that lead to shaking/rigors especially at night.  He has only been eating 1 meal per day (breakfast) and skipping most other meals.  Today his wife took an oral temp and it was 103 F so he came to the ED.  He has no headache, neck pain/stiffness, cough, rhinorrhea, sore throat, chest pain, shortness of breath, abdominal pain, vomiting, nausea, urinary complaints.  He has chronic intermittent loose stools that has been ongoing since he had a cholecystectomy last year.    ER course:  -Afebrile, HR 80s, BP 90s/60s, RR 24, 98% on RA  -K 4.0, creatinine 2.03, BUN 25, bicarb 22, anion gap 17  -AST 54, ALT 53, T. bili 0.7, lipase 28  -WBC 17.1, lactic 2.2  -UA with 9 WBCs, 3 RBCs  -COVID/RSV/flu swab negative  -Blood cultures drawn  -CT abdomen pelvis without contrast with nonspecific inflammation about the duodenum/pancreatic head, a few 1 to 2 mm kidney stones in the right kidney and a nonspecific lesion in the caudate lobe of the liver.  -Chest x-ray clear  -Given 2 L normal saline and Tylenol    Past Medical History    Past Medical History:   Diagnosis Date    Coronary artery disease     Heart attack (H)     " Hypertension     Mixed hyperlipidemia     Stented coronary artery     Thyroid disease        Past Surgical History   Past Surgical History:   Procedure Laterality Date    CARDIAC SURGERY      2 cardiac stents    ENT SURGERY      tonsillectomy    LAPAROSCOPIC CHOLECYSTECTOMY WITH CHOLANGIOGRAMS N/A 2024    Procedure: CHOLECYSTECTOMY, LAPAROSCOPIC, WITH CHOLANGIOGRAM;  Surgeon: Kain Hrebert MD;  Location: [facility] OR    ORTHOPEDIC SURGERY      knee scope    PARATHYROIDECTOMY         Prior to Admission Medications   Prior to Admission Medications   Prescriptions Last Dose Informant Patient Reported? Taking?   alirocumab (PRALUENT) 150 MG/ML injectable pen   Yes No   Sig: Inject 150 mg Subcutaneous every 14 days   aspirin 81 MG EC tablet   Yes No   Sig: Take 81 mg by mouth daily   diltiazem (CARDIZEM) 120 MG tablet   Yes No   Sig: Take 240 mg by mouth daily   levothyroxine (SYNTHROID/LEVOTHROID) 100 MCG tablet   Yes No   Sig: Take 100 mcg by mouth daily   losartan (COZAAR) 100 MG tablet   Yes No   Sig: Take 100 mg by mouth daily   nitroGLYcerin (NITROSTAT) 0.4 MG sublingual tablet   Yes No   Sig: Place 0.4 mg under the tongue as needed   ondansetron (ZOFRAN ODT) 4 MG ODT tab   No No   Sig: Take 1 tablet (4 mg) by mouth every 8 hours as needed for nausea   oxyCODONE (ROXICODONE) 5 MG tablet   No No   Sig: Take 1-2 tablets (5-10 mg) by mouth every 4 hours as needed for moderate to severe pain      Facility-Administered Medications: None        Review of Systems    The 10 point Review of Systems is negative other than noted in the HPI or here.     Social History   I have reviewed this patient's social history and updated it with pertinent information if needed.  Social History     Tobacco Use    Smoking status: Former     Current packs/day: 0.00     Types: Cigarettes     Quit date:      Years since quittin.1    Smokeless tobacco: Never   Substance Use Topics    Alcohol use: Yes     Comment: 4-6/week     Drug use: Never         Family History     No significant family history, including no history of: Immunodeficiencies      Allergies   Allergies   Allergen Reactions    Chlorthalidone Muscle Pain (Myalgia)     Cramping severe    Metoprolol Other (See Comments)     Bloating, weight gain, heart burn.    Amlodipine Rash    Carvedilol Other (See Comments)     Wt gain, bloating, loss of appetite    Isosorbide Nitrate Headache    Codeine Unknown     Other Reaction(s): *Unknown    Statins Muscle Pain (Myalgia)    Atorvastatin Muscle Pain (Myalgia)        Physical Exam   Vital Signs: Temp: 98  F (36.7  C) Temp src: Oral BP: 108/70 Pulse: 81   Resp: 22 SpO2: 96 % O2 Device: None (Room air)    Weight: 208 lbs 15.94 oz    GENERAL: Lying in bed, no distress, appears stated age   HEENT: NC/AT, sclera anicteric   NECK: Able to flex and extend neck without difficulty  CV: RRR, no M/R/G, CR < 2 s   PULM: CTAB, no wheezes, rales, rhonchi   GI: Abd soft, NT, ND, no involuntary or voluntary guarding, not an acute abdomen   MSK: WWP, no LE edema   NEURO: Awake, alert, oriented to 2/16/2025, CN II-XII grossly intact, JACKMAN, appears nonfocal  SKIN: no rash           Medical Decision Making       60 MINUTES SPENT BY ME on the date of service doing chart review, history, exam, documentation & further activities per the note.      Data     I have personally reviewed the following data over the past 24 hrs:    17.1 (H)  \   13.8   / 303     136 97 (L) 25.0 (H) /  138 (H)   4.0 22 2.03 (H) \     ALT: 53 AST: 54 (H) AP: 62 TBILI: 0.7   ALB: 4.0 TOT PROTEIN: 7.5 LIPASE: 28     Procal: N/A CRP: N/A Lactic Acid: 2.2 (H)         Imaging results reviewed over the past 24 hrs:   Recent Results (from the past 24 hours)   Chest XR,  PA & LAT    Narrative    EXAM: XR CHEST 2 VIEWS  LOCATION: [facility]  DATE: 2/16/2025    INDICATION: fever, elevated wbc, recent COVID  COMPARISON: None.      Impression    IMPRESSION:     Heart  size is normal. Lungs are clear bilaterally. Mediastinum and visualized bony structures are unremarkable.    CT Abdomen Pelvis w/o Contrast    Narrative    EXAM: CT ABDOMEN PELVIS W/O CONTRAST  LOCATION: [facility]  DATE: 2/16/2025    INDICATION: sepsis (fever, WBC), TESSA, microscopic hematuria  COMPARISON: None.  TECHNIQUE: CT scan of the abdomen and pelvis was performed without IV contrast. Multiplanar reformats were obtained. Dose reduction techniques were used.  CONTRAST: None.    FINDINGS:   LOWER CHEST: Normal.    HEPATOBILIARY: Low-attenuation lesion within the caudate lobe measuring 3 x 3 cm which is indeterminant. Cholecystectomy.    PANCREAS: Within the pancreatic head and second portion of the duodenum, there is ill-defined soft tissue change and small foci of air is difficult to tell whether this is within bowel or within the soft tissues.    SPLEEN: Normal.    ADRENAL GLANDS: Normal.    KIDNEYS/BLADDER: 2 small stones measuring 2 mm are noted within the right kidney. Kidneys otherwise unremarkable with no evidence of hydronephrosis.    BOWEL: Diverticulosis of the colon. No obstruction.     LYMPH NODES: Normal.    VASCULATURE: Mild atherosclerotic disease of the abdominal aorta and its branches. Small fat-containing left inguinal hernia.    PELVIC ORGANS: Bladder within normal limits.    MUSCULOSKELETAL: Normal.      Impression    IMPRESSION:   1.  Few tiny 1 to 2 mm stone noted within the1 right kidney. No hydronephrosis.  2.  Ill-defined soft tissue with inflammatory changes and air noted adjacent to the pancreatic head and second portion of the duodenum which could be related to duodenitis, pancreatitis, or possible malignancy. This area is poorly evaluated due to lack   of oral and IV contrast. Recommend CT or MRI of the pancreas for further evaluation.  3.  Low-attenuation lesion within the caudate lobe which is indeterminant. This can be followed up on subsequent imaging.

## 2025-02-17 NOTE — PROVIDER NOTIFICATION
FYI patient's latest T 103 F, currently on IVF  ml/hr and on IV Ceftriaxone and will give Tylenol. Please advise. Thanks.    No new orders.

## 2025-02-17 NOTE — H&P
[facility] History and Physical    Victoriano Bourgeois MRN# [id number]   Age: 64 year old YOB: 1960     Date of Admission:  2/16/2025    [place] clinic: {:[id number]}  Primary care provider: Jessika Bernal          Assessment and Plan:   Assessment:   Victoriano Bourgeois is a 64 year old man with history COVID a couple of weeks ago who came to attention with fever, malaise and was found to have TESSA.     PMH includes CAD, hypothyroidism, ***    He had initially presented to  but was directed to the ED for eval of possible sepsis.    On presentation to the ED, VS: ***    DX:  Weakness and malaise assoc with fever.  TESSA.        Plan:   {:4756040}             Chief Complaint:     ***     {   History obtained from                     :5945611}     {   HPI format                                      :4324534}         Past Medical History:   {:0147341}          Past Surgical History:    {:1339022}          Social History:   {:1715849}          Family History:   {:7032107}          Immunizations:   {:5116622}          Allergies:   {:5284004}          Medications:   {:3887242}          Review of Systems:     {:3679069}      {   Physical exam format             :1519280}          Data:   {   Lab options               :2931632}   {   Cardiac studies        :3318894}   {   Imaging                     :4720267}      Attestation:  {   Attending physician attestation:[id number]}     Nicholas Lewis MD

## 2025-02-18 ENCOUNTER — APPOINTMENT (OUTPATIENT)
Dept: CT IMAGING | Facility: CLINIC | Age: 65
DRG: 871 | End: 2025-02-18
Attending: HOSPITALIST
Payer: MEDICARE

## 2025-02-18 LAB
ALBUMIN SERPL BCG-MCNC: 3.2 G/DL (ref 3.5–5.2)
ALP SERPL-CCNC: 43 U/L (ref 40–150)
ALT SERPL W P-5'-P-CCNC: 60 U/L (ref 0–70)
ANION GAP SERPL CALCULATED.3IONS-SCNC: 12 MMOL/L (ref 7–15)
AST SERPL W P-5'-P-CCNC: 71 U/L (ref 0–45)
BACTERIA UR CULT: NORMAL
BILIRUB SERPL-MCNC: 0.3 MG/DL
BUN SERPL-MCNC: 16.8 MG/DL (ref 8–23)
CALCIUM SERPL-MCNC: 8.1 MG/DL (ref 8.8–10.4)
CHLORIDE SERPL-SCNC: 102 MMOL/L (ref 98–107)
CREAT SERPL-MCNC: 1.05 MG/DL (ref 0.67–1.17)
CRP SERPL-MCNC: 154.72 MG/L
EGFRCR SERPLBLD CKD-EPI 2021: 79 ML/MIN/1.73M2
ERYTHROCYTE [DISTWIDTH] IN BLOOD BY AUTOMATED COUNT: 13.2 % (ref 10–15)
GLUCOSE SERPL-MCNC: 111 MG/DL (ref 70–99)
HCO3 SERPL-SCNC: 22 MMOL/L (ref 22–29)
HCT VFR BLD AUTO: 33.2 % (ref 40–53)
HGB BLD-MCNC: 11.2 G/DL (ref 13.3–17.7)
MCH RBC QN AUTO: 28.4 PG (ref 26.5–33)
MCHC RBC AUTO-ENTMCNC: 33.7 G/DL (ref 31.5–36.5)
MCV RBC AUTO: 84 FL (ref 78–100)
PLATELET # BLD AUTO: 217 10E3/UL (ref 150–450)
POTASSIUM SERPL-SCNC: 3.8 MMOL/L (ref 3.4–5.3)
PROCALCITONIN SERPL IA-MCNC: 0.41 NG/ML
PROT SERPL-MCNC: 5.7 G/DL (ref 6.4–8.3)
RBC # BLD AUTO: 3.95 10E6/UL (ref 4.4–5.9)
SODIUM SERPL-SCNC: 136 MMOL/L (ref 135–145)
WBC # BLD AUTO: 9.3 10E3/UL (ref 4–11)

## 2025-02-18 PROCEDURE — 84145 PROCALCITONIN (PCT): CPT | Performed by: PHYSICIAN ASSISTANT

## 2025-02-18 PROCEDURE — 87338 HPYLORI STOOL AG IA: CPT

## 2025-02-18 PROCEDURE — 250N000011 HC RX IP 250 OP 636: Performed by: HOSPITALIST

## 2025-02-18 PROCEDURE — 74177 CT ABD & PELVIS W/CONTRAST: CPT

## 2025-02-18 PROCEDURE — 86140 C-REACTIVE PROTEIN: CPT | Performed by: PHYSICIAN ASSISTANT

## 2025-02-18 PROCEDURE — 80053 COMPREHEN METABOLIC PANEL: CPT | Performed by: PHYSICIAN ASSISTANT

## 2025-02-18 PROCEDURE — 36415 COLL VENOUS BLD VENIPUNCTURE: CPT | Performed by: PHYSICIAN ASSISTANT

## 2025-02-18 PROCEDURE — 99222 1ST HOSP IP/OBS MODERATE 55: CPT | Performed by: SURGERY

## 2025-02-18 PROCEDURE — 250N000009 HC RX 250: Performed by: HOSPITALIST

## 2025-02-18 PROCEDURE — 85027 COMPLETE CBC AUTOMATED: CPT | Performed by: PHYSICIAN ASSISTANT

## 2025-02-18 PROCEDURE — 250N000013 HC RX MED GY IP 250 OP 250 PS 637: Performed by: INTERNAL MEDICINE

## 2025-02-18 PROCEDURE — 99232 SBSQ HOSP IP/OBS MODERATE 35: CPT | Performed by: HOSPITALIST

## 2025-02-18 PROCEDURE — 36415 COLL VENOUS BLD VENIPUNCTURE: CPT

## 2025-02-18 PROCEDURE — 250N000013 HC RX MED GY IP 250 OP 250 PS 637: Performed by: PHYSICIAN ASSISTANT

## 2025-02-18 PROCEDURE — 82105 ALPHA-FETOPROTEIN SERUM: CPT

## 2025-02-18 PROCEDURE — 120N000001 HC R&B MED SURG/OB

## 2025-02-18 RX ORDER — SODIUM CHLORIDE AND POTASSIUM CHLORIDE 150; 900 MG/100ML; MG/100ML
INJECTION, SOLUTION INTRAVENOUS CONTINUOUS
Status: DISCONTINUED | OUTPATIENT
Start: 2025-02-18 | End: 2025-02-19

## 2025-02-18 RX ORDER — PIPERACILLIN SODIUM, TAZOBACTAM SODIUM 3; .375 G/15ML; G/15ML
3.38 INJECTION, POWDER, LYOPHILIZED, FOR SOLUTION INTRAVENOUS EVERY 6 HOURS
Status: DISCONTINUED | OUTPATIENT
Start: 2025-02-18 | End: 2025-02-24

## 2025-02-18 RX ORDER — IOPAMIDOL 755 MG/ML
500 INJECTION, SOLUTION INTRAVASCULAR ONCE
Status: COMPLETED | OUTPATIENT
Start: 2025-02-18 | End: 2025-02-18

## 2025-02-18 RX ORDER — IBUPROFEN 400 MG/1
800 TABLET, FILM COATED ORAL EVERY 6 HOURS PRN
Status: DISCONTINUED | OUTPATIENT
Start: 2025-02-18 | End: 2025-02-24 | Stop reason: HOSPADM

## 2025-02-18 RX ORDER — DILTIAZEM HYDROCHLORIDE 240 MG/1
240 CAPSULE, EXTENDED RELEASE ORAL DAILY
Status: DISCONTINUED | OUTPATIENT
Start: 2025-02-18 | End: 2025-02-18

## 2025-02-18 RX ADMIN — LEVOTHYROXINE SODIUM 50 MCG: 0.05 TABLET ORAL at 07:28

## 2025-02-18 RX ADMIN — DILTIAZEM HYDROCHLORIDE 240 MG: 120 CAPSULE, COATED, EXTENDED RELEASE ORAL at 07:28

## 2025-02-18 RX ADMIN — IOPAMIDOL 100 ML: 755 INJECTION, SOLUTION INTRAVENOUS at 15:28

## 2025-02-18 RX ADMIN — PANTOPRAZOLE SODIUM 40 MG: 40 INJECTION, POWDER, FOR SOLUTION INTRAVENOUS at 19:41

## 2025-02-18 RX ADMIN — PIPERACILLIN AND TAZOBACTAM 3.38 G: 3; .375 INJECTION, POWDER, FOR SOLUTION INTRAVENOUS at 13:29

## 2025-02-18 RX ADMIN — ACETAMINOPHEN 650 MG: 325 TABLET, FILM COATED ORAL at 14:43

## 2025-02-18 RX ADMIN — ACETAMINOPHEN 650 MG: 325 TABLET, FILM COATED ORAL at 18:47

## 2025-02-18 RX ADMIN — SODIUM CHLORIDE 65 ML: 9 INJECTION, SOLUTION INTRAVENOUS at 15:28

## 2025-02-18 RX ADMIN — SODIUM CHLORIDE AND POTASSIUM CHLORIDE: .9; .15 SOLUTION INTRAVENOUS at 14:18

## 2025-02-18 RX ADMIN — IBUPROFEN 800 MG: 400 TABLET, FILM COATED ORAL at 17:40

## 2025-02-18 RX ADMIN — PIPERACILLIN AND TAZOBACTAM 3.38 G: 3; .375 INJECTION, POWDER, FOR SOLUTION INTRAVENOUS at 17:42

## 2025-02-18 RX ADMIN — ACETAMINOPHEN 650 MG: 325 TABLET, FILM COATED ORAL at 07:27

## 2025-02-18 ASSESSMENT — ACTIVITIES OF DAILY LIVING (ADL)
DIFFICULTY_EATING/SWALLOWING: NO
ADLS_ACUITY_SCORE: 47
DIFFICULTY_COMMUNICATING: NO
ADLS_ACUITY_SCORE: 27
FALL_HISTORY_WITHIN_LAST_SIX_MONTHS: NO
ADLS_ACUITY_SCORE: 47
ADLS_ACUITY_SCORE: 44
WEAR_GLASSES_OR_BLIND: NO
ADLS_ACUITY_SCORE: 47
ADLS_ACUITY_SCORE: 27
ADLS_ACUITY_SCORE: 27
ADLS_ACUITY_SCORE: 47
ADLS_ACUITY_SCORE: 27
ADLS_ACUITY_SCORE: 44
TOILETING_ISSUES: NO
DOING_ERRANDS_INDEPENDENTLY_DIFFICULTY: NO
ADLS_ACUITY_SCORE: 47
ADLS_ACUITY_SCORE: 44
ADLS_ACUITY_SCORE: 47
HEARING_DIFFICULTY_OR_DEAF: NO
ADLS_ACUITY_SCORE: 47
ADLS_ACUITY_SCORE: 47
WALKING_OR_CLIMBING_STAIRS_DIFFICULTY: NO
ADLS_ACUITY_SCORE: 47
CHANGE_IN_FUNCTIONAL_STATUS_SINCE_ONSET_OF_CURRENT_ILLNESS/INJURY: NO
ADLS_ACUITY_SCORE: 27
ADLS_ACUITY_SCORE: 44
ADLS_ACUITY_SCORE: 44
CONCENTRATING,_REMEMBERING_OR_MAKING_DECISIONS_DIFFICULTY: NO
DRESSING/BATHING_DIFFICULTY: NO

## 2025-02-18 NOTE — PLAN OF CARE
"Goal Outcome Evaluation:    Care From: [id number]  Admitted Diagnosis: Fever, Generalized Weakness      Plan of Care Reviewed With: patient    Overall Patient Progress: improvingOverall Patient Progress: improving    Outcome Evaluation: A&Ox4. Denies pain. No fever on this shift. on Rocephine. BC negative to date. SBA.    Vitals: /77 (BP Location: Right arm)   Pulse 74   Temp 99.3  F (37.4  C) (Oral)   Resp 16   Ht 1.803 m (5' 11\")   Wt 96.1 kg (211 lb 12.8 oz)   SpO2 94%   BMI 29.54 kg/m       Neuro: alert and oriented x4. Able to make needs known.  Cardiac: wdl  Lungs: wdl  GI: wdl. Denies nausea, vomiting, abdominal pain.  : wdl  Pain: denies pain  IV: Rocephin. IVF stopped on this shift per the order.    Labs/Imaging: Na 134. Cr 1.44. WBC 10.1. MRCP completed and resulted.   Diet: clear liquid diet  Activity: SBA  Misc:   Plan: continue monitoring for fever. Follow up with BC. IV antibiotic.     Problem: Adult Inpatient Plan of Care  Goal: Plan of Care Review  Description: The Plan of Care Review/Shift note should be completed every shift.  The Outcome Evaluation is a brief statement about your assessment that the patient is improving, declining, or no change.  This information will be displayed automatically on your shift  note.  Outcome: Progressing  Flowsheets (Taken 2/18/2025 0406)  Outcome Evaluation: A&Ox4. Denies pain. No fever on this shift. on Rocephine. BC negative to date. SBA.  Plan of Care Reviewed With: patient  Overall Patient Progress: improving  Goal: Patient-Specific Goal (Individualized)  Description: You can add care plan individualizations to a care plan. Examples of Individualization might be:  \"Parent requests to be called daily at 9am for status\", \"I have a hard time hearing out of my right ear\", or \"Do not touch me to wake me up as it startles  me\".  Outcome: Progressing  Goal: Absence of Hospital-Acquired Illness or Injury  Outcome: Progressing  Intervention: Identify and " Manage Fall Risk  Recent Flowsheet Documentation  Taken 2/18/2025 0225 by Vonnie Mcfadden RN  Safety Promotion/Fall Prevention:   clutter free environment maintained   lighting adjusted   nonskid shoes/slippers when out of bed  Intervention: Prevent Skin Injury  Recent Flowsheet Documentation  Taken 2/18/2025 0225 by Vonnie Mcfadden RN  Body Position: position changed independently  Goal: Optimal Comfort and Wellbeing  Outcome: Progressing  Goal: Readiness for Transition of Care  Outcome: Progressing     Problem: Infection  Goal: Absence of Infection Signs and Symptoms  Outcome: Progressing     Problem: Fatigue  Goal: Improved Activity Tolerance  Outcome: Progressing  Intervention: Promote Improved Energy  Recent Flowsheet Documentation  Taken 2/18/2025 0225 by Vonnie Mcfadden RN  Activity Management: activity adjusted per tolerance     Problem: Comorbidity Management  Goal: Blood Pressure in Desired Range  Outcome: Progressing  Intervention: Maintain Blood Pressure Management  Recent Flowsheet Documentation  Taken 2/18/2025 0225 by Vonnie Mcfadden RN  Medication Review/Management: medications reviewed

## 2025-02-18 NOTE — CARE PLAN
"Patient is alert and oriented x4. VS WNL and documented on the FS. Temp 103.9 and Tylenol/ibuprofen given along with ice packs. Lung sounds clear and patient is on RA. Denies SOB. Active bowel sounds with LBM this morning (stool collected and sent in). Patient denies any pain, urgency, and frequency when voiding. Denies pain. IV fluids infusing at 75 ml/hr. Zosyn Q6hrs. Regular diet now. Independent in room. Denies any numbness.     CT-  Caudate lobe lesion is located superjacent to the contained perforation and the differential favors penetrating disease resulting in a hepatic abscess which is stable in size.     Plan: NPO at  midnight and IR to see. Surgery/GI following.     /83 (BP Location: Left arm)   Pulse 94   Temp (!) 103.1  F (39.5  C) (Oral)   Resp 20   Ht 1.803 m (5' 11\")   Wt 96.1 kg (211 lb 12.8 oz)   SpO2 94%   BMI 29.54 kg/m      "

## 2025-02-18 NOTE — PLAN OF CARE
"INPATIENT NOTE: 1500 -2300     PRIMARY PROBLEM: General Weakness/fever/TESSA     Vital Signs: Stable, except fever on and off. Treating with tylenol        Orientation: A/O x 4  Neuro: Intact  Pain status: Denies pain, noted with generalized weakness  Resp: Lung sounds CTA, denies any SOB  Cardiac: WDL  GI: Bowel sounds active.  : Continent, voiding adequately  Skin: Intact  LDA: Peripheral IV to ZULLY infusing LR @ 100 ml/hr  Pertinent Labs/Imaging: Abdominal MRI completed  Diet: Clear Liquid  Activity: SBA  Alarms/Safety: All alarms on and audible   Consults: NA   Discharge Plan: Continue with IVF LR & Rocephin once daily - sepsis  Discharge Date: TBD     Will continue to monitor and provide cares.     Beatriz Brown RN Problem: Adult Inpatient Plan of Care  Goal: Plan of Care Review  Description: The Plan of Care Review/Shift note should be completed every shift.  The Outcome Evaluation is a brief statement about your assessment that the patient is improving, declining, or no change.  This information will be displayed automatically on your shift  note.  Outcome: Progressing  Goal: Patient-Specific Goal (Individualized)  Description: You can add care plan individualizations to a care plan. Examples of Individualization might be:  \"Parent requests to be called daily at 9am for status\", \"I have a hard time hearing out of my right ear\", or \"Do not touch me to wake me up as it startles  me\".  Outcome: Progressing  Goal: Absence of Hospital-Acquired Illness or Injury  Outcome: Progressing  Intervention: Identify and Manage Fall Risk  Recent Flowsheet Documentation  Taken 2/17/2025 1734 by Beatriz Brown RN  Safety Promotion/Fall Prevention:   activity supervised   clutter free environment maintained   lighting adjusted   mobility aid in reach   nonskid shoes/slippers when out of bed  Intervention: Prevent Skin Injury  Recent Flowsheet Documentation  Taken 2/17/2025 1734 by Beatriz Brown RN  Body Position: " position changed independently  Intervention: Prevent and Manage VTE (Venous Thromboembolism) Risk  Recent Flowsheet Documentation  Taken 2/17/2025 1734 by Beatriz Brown, RN  VTE Prevention/Management: SCDs off (sequential compression devices)  Intervention: Prevent Infection  Recent Flowsheet Documentation  Taken 2/17/2025 1734 by Beatriz Brown, RN  Infection Prevention:   cohorting utilized   hand hygiene promoted   single patient room provided   rest/sleep promoted  Goal: Optimal Comfort and Wellbeing  Outcome: Progressing  Goal: Readiness for Transition of Care  Outcome: Progressing

## 2025-02-18 NOTE — PROGRESS NOTES
[facility]    Medicine Progress Note - Hospitalist Service    Date of Admission:  2/16/2025    Assessment & Plan      Victoriano Bourgeois is a 64 year old male admitted on 2/16/2025. He has PMHx most notable for HTN, HLD, STEMI, CAD s/p PCI x 2, and hypothyroidism that presents with reported fever, chills and has findings concerning for sepsis of unknown source and acute renal failure secondary to dehydration.    Suspect intra-abdominal infection  MRCP/CT show: duodenitis vs ulcer with some air seen on CT ? Perforation  GI to see  Surgery to see    Severe sepsis  Likely intra-abdominal source  Possible perforated ulcer/duodenitis    - patient presented with fever (to 104) and chills, fatigue and a few episodes of diarrhea    - decreased appetite x months    - UA-culture/CXR unrevealing    - blood cultures negative    - still having fevers: last 100.9 at 7am (but also getting Tylenol regularly)    - CT abdo/pelvis: ill-defined soft tissue with inflammatory changes and air noted adjacent to pancreatic head and second portion of duodenum which could be related duodenitis, pancreatitis or malignancy    - MRCP: duodenitis vs ulcer    - received ceftriaxone x 2 days, now change to zosyn to cover intra-abdominal infection    - interestingly patient denies abdominal pain and does not have tenderness on exam    - GI consult for EGD: spoke with GI, asking surgery to review imaging    - I spoke with Dr. Ramsey: will see patient, possible EGD    - protonix IV BID    - NPO    - start IVF     Anion gap metabolic acidosis    - resolved    Acute renal failure  Chronic kidney disease II    - likely due to dehydration and poor p.o. intake    - resolved    - cont to hold losartan as BPs are low normal    HTN  HLD  CAD s/p PCI x 2 in 2019  History of STEMI in 2019    - presented to [facility] in 2019 with chest pain and was found to have inferior STEMI and had stents placed in the mid circumflex and obtuse  "marginal artery    - continue PTA diltiazem and ASA 81 mg (holding ASA in case of procedure/surgery)    - patient is intolerant of statins    - on alirocumab every 2 weeks, resume on discharge    Hypothyroidism    - continue PTA Synthroid    Hyponatremia    - mild, 133    - resolved    Wife in room and updated      Diet: NPO per Anesthesia Guidelines for Procedure/Surgery Except for: Meds, Ice Chips    DVT Prophylaxis: Pneumatic Compression Devices  Myrick Catheter: Not present  Lines: None     Cardiac Monitoring: None  Code Status: Full Code      Clinically Significant Risk Factors         # Hyponatremia: Lowest Na = 133 mmol/L in last 2 days, will monitor as appropriate  # Hypochloremia: Lowest Cl = 97 mmol/L in last 2 days, will monitor as appropriate      # Hypoalbuminemia: Lowest albumin = 3 g/dL at 2/17/2025  5:39 AM, will monitor as appropriate  # Coagulation Defect: INR = 1.36 (Ref range: 0.85 - 1.15) and/or PTT = N/A, will monitor for bleeding  # Drug Induced Platelet Defect: home medication list includes an antiplatelet medication   # Hypertension: Home medication list includes antihypertensive(s)            # Overweight: Estimated body mass index is 29.54 kg/m  as calculated from the following:    Height as of this encounter: 1.803 m (5' 11\").    Weight as of this encounter: 96.1 kg (211 lb 12.8 oz)., PRESENT ON ADMISSION            Social Drivers of Health    Tobacco Use: Medium Risk (2/16/2025)    Received from [facility]    Patient History     Smoking Tobacco Use: Former     Smokeless Tobacco Use: Never    Received from [facility], [facility]    Social Connections          Disposition Plan     Medically Ready for Discharge: Anticipated in 2-4 Days    The patient's care was discussed with the Bedside Nurse, Patient, and Patient's Family.    Lino Mullins MD  Hospitalist Service  [facility]  Securely message " with [employer] (more info)  Text page via [facility] Paging/Directory   ______________________________________________________________________    Interval History   I assumed care of the patient today.  Patient denies any symptoms of chest pain, shortness of breath.  He specifically denies any abdominal pain.  No nausea.  He occasionally has some mid to low back pain, but denies any right now.  No other specific symptoms of cough, runny nose, sore throat.  No urinary symptoms.    Physical Exam   Vital Signs: Temp: 98.2  F (36.8  C) Temp src: Oral BP: 124/71 Pulse: 65   Resp: 18 SpO2: 95 % O2 Device: None (Room air)    Weight: 211 lbs 12.8 oz    GENERAL:  Comfortable.  PSYCH: pleasant, oriented, No acute distress.  HEART:  Normal S1, S2 with no murmur, no pericardial rub, gallops or S3 or S4.  LUNGS:  Clear to auscultation, normal Respiratory effort. No wheezing, rales or ronchi.  GI:  Soft, normal bowel sounds. Non-tender, non distended.   EXTREMITIES:  No pedal edema, +2 pulses bilateral and equal.  SKIN:  Dry to touch, No rash, wound or ulcerations.  NEUROLOGIC:  grossly intact    No spinal or paraspinal tenderness    Medical Decision Making       65 MINUTES SPENT BY ME on the date of service doing chart review, history, exam, documentation & further activities per the note.      Data     I have personally reviewed the following data over the past 24 hrs:    9.3  \   11.2 (L)   / 217     136 102 16.8 /  111 (H)   3.8 22 1.05 \     ALT: 60 AST: 71 (H) AP: 43 TBILI: 0.3   ALB: 3.2 (L) TOT PROTEIN: 5.7 (L) LIPASE: N/A     Procal: 0.41 CRP: 154.72 (H) Lactic Acid: N/A         Imaging results reviewed over the past 24 hrs:   Recent Results (from the past 24 hours)   MR Abdomen MRCP w/o & w Contrast    Narrative    EXAM: MR ABDOMEN MRCP W/O and W CONTRAST  LOCATION: [facility]  DATE: 2/17/2025    INDICATION: possible pancreatic malignancy, additional nonspecific lesion in the caudate lobe of the  liver  COMPARISON: CT 2/16/2025  TECHNIQUE: Routine MR liver/pancreas protocol including axial and coronal MRCP sequences. 2D and 3D reconstruction performed by MR technologist including MIP reconstruction and slab cholangiograms. If performed with contrast, additional dynamic T1 post   IV contrast images.   CONTRAST: 10 mL Gadavist     FINDINGS:     MRCP: Prior cholecystectomy. No evidence of choledocholithiasis or biliary obstruction. Duodenal diverticulum is noted adjacent to the ampulla.    LIVER: No morphologic changes of chronic liver disease. No significant iron or fat deposition. T2 hyperintense lesion in the caudate which corresponds to findings on recent CT, measuring up to 3.8 x 3.6 cm (series 7 image 22), difficult to determine if   it is complex cystic or solid lesion. There is some peripheral and wispy internal postcontrast enhancement, although the majority of the lesion is nonenhancing. No additional liver lesions are visualized.    PANCREAS: No ductal dilation or surrounding inflammation. No measurable pancreatic mass is visualized.    ADDITIONAL FINDINGS: There are are inflammatory changes centered around the proximal duodenum which appears slightly decreased in comparison to most recent CT given differences in modality. Enlarged portacaval lymph node measuring 1.9 cm in short axis   with increased diffusion signal (series 18 image 64). No additional lymphadenopathy is visualized. Spleen and adrenal glands are unremarkable. No hydronephrosis. No abdominopelvic free fluid. No evidence of mechanical bowel obstruction. No acute bony   abnormality.      Impression    IMPRESSION:  1. Inflammatory changes centered around the proximal duodenum with enlarged adjacent lymph node, favor sequela of duodenitis or ulcer, could consider direct visualization with endoscopy if clinically indicated.  2. Caudate hepatic lesion as above, could represent an abscess in the setting of #1 and elevated WBC count but  recommend sampling and/or radiographic follow-up to ensure resolution to exclude neoplasm.  3. No measurable pancreatic mass visualized.

## 2025-02-18 NOTE — CONSULTS
Chief complaint:  Fever and chills    HPI:  This patient is a 64 year old male who presents with fever and chills over the last 5 days.  The patient had COVID about 3 weeks ago, and redeveloped fevers, which the patient attributed to a reactivation of COVID.  He continued to feel quite ill and have fevers up to 104.  The patient therefore presented to an urgent care, from which she was sent to the emergency room.  CT scan showed inflammation around the second portion of the duodenum and the head of the pancreas.  There was a question of some bubbles of air in the area.  He subsequently underwent an MRI yesterday.  This showed less overall inflammation but did show a lesion in the caudate lobe of the liver, felt possibly to represent an abscess or inflammatory process.  The patient has continued to have intermittent rigors.  He does state that he feels better today than he did yesterday.  The patient was seen by the GI service to consider endoscopy.  They are hesitant to proceed, given the possibility of contained perforation of an ulcer.  On presentation, the patient's creatinine was elevated.  This has now normalized.    Past Medical History:   has a past medical history of Coronary artery disease, Heart attack (H), Hypertension, Mixed hyperlipidemia, Stented coronary artery, and Thyroid disease.    Past Surgical History:  Past Surgical History:   Procedure Laterality Date    CARDIAC SURGERY  2019    2 cardiac stents    ENT SURGERY      tonsillectomy    LAPAROSCOPIC CHOLECYSTECTOMY WITH CHOLANGIOGRAMS N/A 4/5/2024    Procedure: CHOLECYSTECTOMY, LAPAROSCOPIC, WITH CHOLANGIOGRAM;  Surgeon: Kain Herbert MD;  Location: [facility] OR    ORTHOPEDIC SURGERY  2019    knee scope    PARATHYROIDECTOMY  2022        Social History:  Social History     Socioeconomic History    Marital status:      Spouse name: Not on file    Number of children: Not on file    Years of education: Not on file    Highest education level: Not  on file   Occupational History    Not on file   Tobacco Use    Smoking status: Former     Current packs/day: 0.00     Types: Cigarettes     Quit date: 2017     Years since quittin.1    Smokeless tobacco: Never   Substance and Sexual Activity    Alcohol use: Yes     Comment: 4-6/week    Drug use: Never    Sexual activity: Not on file   Other Topics Concern    Not on file   Social History Narrative    Not on file     Social Drivers of Health     Financial Resource Strain: Low Risk  (2025)    Financial Resource Strain     Within the past 12 months, have you or your family members you live with been unable to get utilities (heat, electricity) when it was really needed?: No   Food Insecurity: Low Risk  (2025)    Food Insecurity     Within the past 12 months, did you worry that your food would run out before you got money to buy more?: No     Within the past 12 months, did the food you bought just not last and you didn t have money to get more?: No   Transportation Needs: Low Risk  (2025)    Transportation Needs     Within the past 12 months, has lack of transportation kept you from medical appointments, getting your medicines, non-medical meetings or appointments, work, or from getting things that you need?: No   Physical Activity: Not on file   Stress: Not on file   Social Connections: Unknown (2022)    Received from [facility], [facility]    Social Connections     Frequency of Communication with Friends and Family: Not on file   Interpersonal Safety: Not on file   Housing Stability: Low Risk  (2025)    Housing Stability     Do you have housing? : Yes     Are you worried about losing your housing?: No        Family History:  No family history on file.    Review of Systems:  The 10 point Review of Systems is negative other than noted in the HPI and above.    Physical Exam:  General - This is a well developed, well nourished  male in no apparent distress.  He does visibly have the chills.  HEENT - Normocephalic, atraumatic.  No scleral icterus.  Neck - supple without masses  Lungs - clear to ascultation.    Heart - Regular rate & rhythm without murmur  Abdomen:   soft, non-distended with mild tenderness noted in the epigastric region. no masses palpated. normal bowel sounds.  Extremities - warm without edema  Neurologic - nonfocal    Relevant labs:  CRP is 154.72.  Creatinine is 1.05.  White blood cell count is 9300.    Imaging:  Imaging is as noted above.  CT scan showed inflammation and possible contained perforation, around the second portion of the duodenum and the head of the pancreas.  MRI the following day showed probable decrease in inflammation within the limits of the different modality of imaging.  There is also a lesion of uncertain etiology in the caudate lobe of the liver.  The possibility of an abscess was raised.    Assessment and Plan:  This is a patient with fever and chills of uncertain etiology.  There does appear to be some sort of inflammatory process around the second portion of the duodenum and the head of the pancreas.  In the absence of a free perforation, I would not anticipate surgical intervention.  Should the patient have ongoing problems, this is a very complex surgical area, and would likely best be served by a hepatobiliary surgeon.  I would like to obtain a CT scan with contrast.  It has been 2 days since the last CT scan, and I think the contrast, which could not be given for his last study, may help clarify the issue.  We will follow the patient with you.  The patient should remain n.p.o. until after his CT scan.      Kain Herbert MD  [facility]

## 2025-02-18 NOTE — CONSULTS
GASTROENTEROLOGY CONSULTATION      Victoriano Bourgeois  [address]  [facility] [id number]  64 year old male     Admission Date/Time: 2/16/2025  Primary Care Provider: Jessika Bernal  Referring / Attending Physician:  Dr. Lino Mullins     We were asked to see the patient in consultation by Dr. Lino Mullins for evaluation of abnormal CT, MR/MRCP.      HPI:    Victoriano Bourgeois is a 64 year old male admitted 2/16/25. He has a PMH of HTN, HLD, STEMI, CAD s/p PCI x 2, hypothyroidism. He presented with fever, chills and sepsis of unknown source and acute renal failure 2/2 dehydration.    CTAP 2/16/25 with inflammatory changes centered around the proximal duodenum with enlarged adjacent lymph node, favor sequela of duodenitis or ulcer, could consider direct visualization with endoscopy if clinically indicated. CT notes an ill defined soft tissue change and small foci of air within pancreatic head and second portion of the duodenum.    MR Abdomen MRCP 2/17/25 with inflammatory changes centered around proximal duodenum with enlarged adjacent LN, favor sequela of duodenitis or ulcer. Additionally, MR imaging with caudate hepatic lesion as above, which could represent an abscess in the setting of duodenitis/ulcer (recommend sampling and/or radiographic follow-up to ensure resolution to exclude neoplasm).    Initial WBC 17.1, trended down to WNL today. Blood cultures negative to date. No pneumonia on CXR. No exam findings to suggest skin or soft tissue source. Urine cultures negative. Hospitalist service consulting [facility] inquiring if patient should undergo upper endoscopy in light of imaging findings and sepsis.    Today, patient endorses 3/10 upper abdominal discomfort. Denies nausea or vomiting. Last BM ~2 hours ago soft, but no diarrhea or melena. Ran a fever 100.9F this morning and experiencing chills currently. Feels fatigued. States he last used ibuprofen 5 days ago. Typically takes Ibuprofen a couple times weekly. Mentions his  daughter has a history of Crohn's. No known family GI malignancies.       PAST MEDICAL HISTORY:  Patient Active Problem List    Diagnosis Date Noted    Sepsis (H) 02/17/2025     Priority: Medium    Decreased appetite 02/16/2025     Priority: Medium    Abnormal CT of the abdomen 02/16/2025     Priority: Medium    Acute kidney injury 02/16/2025     Priority: Medium    Fever in adult 02/16/2025     Priority: Medium    Leukocytosis, unspecified type 02/16/2025     Priority: Medium       ROS: A comprehensive ten point review of systems was negative aside from those in mentioned in the HPI.       MEDICATIONS:   Prior to Admission medications    Medication Sig Start Date End Date Taking? Authorizing Provider   cetirizine (ZYRTEC) 10 MG tablet Take 10 mg by mouth daily as needed for allergies.   Yes Unknown, Entered By History   diltiazem ER (DILT-XR) 240 MG 24 hr ER beaded capsule Take 240 mg by mouth daily.   Yes Unknown, Entered By History   evolocumab (REPATHA) 140 MG/ML prefilled autoinjector Inject 140 mg subcutaneously every 14 days.   Yes Unknown, Entered By History   levothyroxine (SYNTHROID/LEVOTHROID) 50 MCG tablet Take 50 mcg by mouth every morning (before breakfast).   Yes Unknown, Entered By History   losartan (COZAAR) 100 MG tablet Take 100 mg by mouth daily 1/1/20  Yes Reported, Patient   nitroGLYcerin (NITROSTAT) 0.4 MG sublingual tablet Place 0.4 mg under the tongue as needed 11/7/23  Yes Reported, Patient   omeprazole (PRILOSEC) 20 MG DR capsule Take 20 mg by mouth daily as needed.   Yes Unknown, Entered By History   aspirin 81 MG EC tablet Take 81 mg by mouth daily    Reported, Patient        ALLERGIES:   Allergies   Allergen Reactions    Chlorthalidone Muscle Pain (Myalgia)     Cramping severe    Metoprolol Other (See Comments)     Bloating, weight gain, heart burn.    Amlodipine Rash    Carvedilol Other (See Comments)     Wt gain, bloating, loss of appetite    Isosorbide Nitrate Headache    Codeine  "Unknown     Other Reaction(s): *Unknown    Statins Muscle Pain (Myalgia)    Atorvastatin Muscle Pain (Myalgia)        SOCIAL HISTORY:  Social History     Tobacco Use    Smoking status: Former     Current packs/day: 0.00     Types: Cigarettes     Quit date:      Years since quittin.1    Smokeless tobacco: Never   Substance Use Topics    Alcohol use: Yes     Comment: 4-6/week    Drug use: Never        FAMILY HISTORY:  Daughter Crohn's  Mother \"colitis\"   No known GI malignancies     PHYSICAL EXAM:   /71 (BP Location: Right arm)   Pulse 65   Temp 98.2  F (36.8  C) (Oral)   Resp 18   Ht 1.803 m (5' 11\")   Wt 96.1 kg (211 lb 12.8 oz)   SpO2 95%   BMI 29.54 kg/m       PHYSICAL EXAM:  GENERAL:  Adult male resting on gurney, nontoxic appearing, NAD  SKIN: no suspicious lesions, rashes, jaundice  HEAD: Normocephalic. Atraumatic.  NECK: Neck supple.   EYES: No scleral icterus  RESPIRATORY: Non labored  CARDIOVASCULAR: RRR  GASTROINTESTINAL: +BS, mild epigastric ttp without rebound or guarding.  JOINT/EXTREMITIES:  no gross deformities noted, normal muscle tone  NEURO: CN 2-12 grossly intact, no focal deficits  PSYCH: Normal affect     ADDITIONAL COMMENTS:   I reviewed the patient's new clinical lab test results.     Recent Labs   Lab 25  0525  1716   WBC 9.3 10.1 17.1*   RBC 3.95* 4.07* 4.90   HGB 11.2* 11.4* 13.8   HCT 33.2* 34.5* 41.7   MCV 84 85 85   MCH 28.4 28.0 28.2   MCHC 33.7 33.0 33.1   RDW 13.2 13.2 13.2    232 303     Recent Labs   Lab Test 25  0539 25  1716   POTASSIUM 3.8 3.8 4.0   CHLORIDE 102 101 97*   CO2 22 20* 22   BUN 16.8 25.1* 25.0*   ANIONGAP 12 12 17*     Recent Labs   Lab Test 25  0539 25  1716 25  0851 24  1041   ALBUMIN 3.2* 3.0*  --  4.0   < > 4.3   BILITOTAL 0.3 0.3  --  0.7   < > 1.3*   ALT 60 54  --  53   < > 391*   AST 71* 61*  --  54*   < > 139*   PROTEIN  " --   --  70*  --   --   --    LIPASE  --   --   --  28  --  18    < > = values in this interval not displayed.       Recent Labs   Lab 02/17/25  0539   INR 1.36*     Recent Labs   Lab 02/16/25  1716   LIPASE 28      IMAGING  Recent Results (from the past 24 hours)   MR Abdomen MRCP w/o & w Contrast    Narrative    EXAM: MR ABDOMEN MRCP W/O and W CONTRAST  LOCATION: [facility] [place]  DATE: 2/17/2025    INDICATION: possible pancreatic malignancy, additional nonspecific lesion in the caudate lobe of the liver  COMPARISON: CT 2/16/2025  TECHNIQUE: Routine MR liver/pancreas protocol including axial and coronal MRCP sequences. 2D and 3D reconstruction performed by MR technologist including MIP reconstruction and slab cholangiograms. If performed with contrast, additional dynamic T1 post   IV contrast images.   CONTRAST: 10 mL Gadavist     FINDINGS:     MRCP: Prior cholecystectomy. No evidence of choledocholithiasis or biliary obstruction. Duodenal diverticulum is noted adjacent to the ampulla.    LIVER: No morphologic changes of chronic liver disease. No significant iron or fat deposition. T2 hyperintense lesion in the caudate which corresponds to findings on recent CT, measuring up to 3.8 x 3.6 cm (series 7 image 22), difficult to determine if   it is complex cystic or solid lesion. There is some peripheral and wispy internal postcontrast enhancement, although the majority of the lesion is nonenhancing. No additional liver lesions are visualized.    PANCREAS: No ductal dilation or surrounding inflammation. No measurable pancreatic mass is visualized.    ADDITIONAL FINDINGS: There are are inflammatory changes centered around the proximal duodenum which appears slightly decreased in comparison to most recent CT given differences in modality. Enlarged portacaval lymph node measuring 1.9 cm in short axis   with increased diffusion signal (series 18 image 64). No additional lymphadenopathy is visualized.  Spleen and adrenal glands are unremarkable. No hydronephrosis. No abdominopelvic free fluid. No evidence of mechanical bowel obstruction. No acute bony   abnormality.      Impression    IMPRESSION:  1. Inflammatory changes centered around the proximal duodenum with enlarged adjacent lymph node, favor sequela of duodenitis or ulcer, could consider direct visualization with endoscopy if clinically indicated.  2. Caudate hepatic lesion as above, could represent an abscess in the setting of #1 and elevated WBC count but recommend sampling and/or radiographic follow-up to ensure resolution to exclude neoplasm.  3. No measurable pancreatic mass visualized.         CONSULTATION AND PLAN:    Sepsis  Duodenitis vs. Duodenal Ulcer vs. Perforated Duodenal Ulcer  Hepatic lesion  Victoriano Bourgeois is a 64 year old male admitted 2/16/25. He has a PMH of HTN, HLD, STEMI, CAD s/p PCI x 2, hypothyroidism. He presented with fever, chills and sepsis of unknown source and acute renal failure 2/2 dehydration.    CTAP 2/16/25 with inflammatory changes centered around the proximal duodenum with enlarged adjacent lymph node, favor sequela of duodenitis or ulcer, could consider direct visualization with endoscopy if clinically indicated.    MR Abdomen MRCP 2/17/25 with inflammatory changes centered around proximal duodenum with enlarged adjacent LN, favor sequela of duodenitis or ulcer. Additionally, MR imaging with caudate hepatic lesion as above, which could represent an abscess in the setting of duodenitis/ulcer (recommend sampling and/or radiographic follow-up to ensure resolution to exclude neoplasm). Of note, patient has a family history of Crohn's (daughter). CRP elevated 154.72.     In regards to his sepsis, initial WBC 17.1, trended down to WNL today. Blood cultures negative to date. No pneumonia on CXR. No exam findings to suggest skin or soft tissue source. Urine cultures negative. Hospitalist service consulted [facility] inquiring if  patient should undergo upper endoscopy in light of imaging findings and sepsis.    I discussed the patient case with Dr. Ramsey, GI staff physician, who will discuss his case with hospitalist service urgently. He is concerned about potential perforation, as there is small foci of air on CTAP per above. Patient currently having upper abdominal discomfort and rigors. At this juncture, upper endoscopy may be too risky.    Plan as follows:  -IV antibiotics per primary  -General Surgery consult  -PPI  -Avoid NSAIDs  -Hold off on EGD at this juncture due to concern for possible perforation  -Patient will need serial imaging, timing TBD  -H Pylori stool test  -[facility]   -[facility] will see patient tomorrow    Thank you for asking us to participate in the care of this patient.    52 min of total time was spent providing patient care, including patient evaluation, reviewing documentation/ test results, and .     Alexandrea Rios PA-C  [facility] ( [facility])

## 2025-02-18 NOTE — PLAN OF CARE
"Patient is alert and oriented x4. VS WNL and documented on the FS. Temp 100.9 and Tylenol given. Lung sounds clear and patient is on RA. Denies SOB. Active bowel sounds with LBM this morning. Patient denies any pain, urgency, and frequency when voiding. Denies pain. SL. Clear liquid diet and tolerating. Independent in room. Denies any numbness. Extremities equal in strength bilaterally. MRCP completed yesterday. CRP at 154.75  BC-NGTD  UC- Urogenital olive  /72 (BP Location: Right arm, Patient Position: Semi-Cotton's, Cuff Size: Adult Regular)   Pulse 78   Temp 100.9  F (38.3  C) (Oral)   Resp 16   Ht 1.803 m (5' 11\")   Wt 96.1 kg (211 lb 12.8 oz)   SpO2 95%   BMI 29.54 kg/m      Problem: Adult Inpatient Plan of Care  Goal: Plan of Care Review  Description: The Plan of Care Review/Shift note should be completed every shift.  The Outcome Evaluation is a brief statement about your assessment that the patient is improving, declining, or no change.  This information will be displayed automatically on your shift  note.  Outcome: Progressing  Flowsheets (Taken 2/18/2025 0735)  Plan of Care Reviewed With: patient  Overall Patient Progress: improving  Goal: Patient-Specific Goal (Individualized)  Description: You can add care plan individualizations to a care plan. Examples of Individualization might be:  \"Parent requests to be called daily at 9am for status\", \"I have a hard time hearing out of my right ear\", or \"Do not touch me to wake me up as it startles  me\".  Outcome: Progressing  Goal: Absence of Hospital-Acquired Illness or Injury  Outcome: Progressing  Intervention: Identify and Manage Fall Risk  Recent Flowsheet Documentation  Taken 2/18/2025 0725 by Renata Page, RN  Safety Promotion/Fall Prevention: nonskid shoes/slippers when out of bed  Intervention: Prevent Skin Injury  Recent Flowsheet Documentation  Taken 2/18/2025 0725 by Renata Page, RN  Body Position: position changed independently  Goal: " Optimal Comfort and Wellbeing  Outcome: Progressing  Goal: Readiness for Transition of Care  Outcome: Progressing     Problem: Infection  Goal: Absence of Infection Signs and Symptoms  Outcome: Progressing     Problem: Fatigue  Goal: Improved Activity Tolerance  Outcome: Progressing  Intervention: Promote Improved Energy  Recent Flowsheet Documentation  Taken 2/18/2025 0725 by Renata Page RN  Activity Management: up ad olivier     Problem: Comorbidity Management  Goal: Blood Pressure in Desired Range  Outcome: Progressing  Intervention: Maintain Blood Pressure Management  Recent Flowsheet Documentation  Taken 2/18/2025 0725 by Renata Page, RN  Medication Review/Management: medications reviewed   Goal Outcome Evaluation:      Plan of Care Reviewed With: patient    Overall Patient Progress: improvingOverall Patient Progress: improving

## 2025-02-19 ENCOUNTER — APPOINTMENT (OUTPATIENT)
Dept: ULTRASOUND IMAGING | Facility: CLINIC | Age: 65
DRG: 871 | End: 2025-02-19
Attending: PHYSICIAN ASSISTANT
Payer: MEDICARE

## 2025-02-19 LAB
AFP SERPL-MCNC: <1.8 NG/ML
ANION GAP SERPL CALCULATED.3IONS-SCNC: 11 MMOL/L (ref 7–15)
BASOPHILS # BLD AUTO: 0 10E3/UL (ref 0–0.2)
BASOPHILS NFR BLD AUTO: 0 %
BUN SERPL-MCNC: 16.1 MG/DL (ref 8–23)
CALCIUM SERPL-MCNC: 8.1 MG/DL (ref 8.8–10.4)
CHLORIDE SERPL-SCNC: 104 MMOL/L (ref 98–107)
CREAT SERPL-MCNC: 1.16 MG/DL (ref 0.67–1.17)
EGFRCR SERPLBLD CKD-EPI 2021: 70 ML/MIN/1.73M2
ENTEROCOCCUS FAECALIS: NOT DETECTED
ENTEROCOCCUS FAECIUM: NOT DETECTED
EOSINOPHIL # BLD AUTO: 0 10E3/UL (ref 0–0.7)
EOSINOPHIL NFR BLD AUTO: 0 %
ERYTHROCYTE [DISTWIDTH] IN BLOOD BY AUTOMATED COUNT: 13.3 % (ref 10–15)
GLUCOSE SERPL-MCNC: 120 MG/DL (ref 70–99)
H PYLORI AG STL QL IA: NEGATIVE
HCO3 SERPL-SCNC: 23 MMOL/L (ref 22–29)
HCT VFR BLD AUTO: 31.6 % (ref 40–53)
HGB BLD-MCNC: 10.6 G/DL (ref 13.3–17.7)
IMM GRANULOCYTES # BLD: 0.1 10E3/UL
IMM GRANULOCYTES NFR BLD: 1 %
LISTERIA SPECIES (DETECTED/NOT DETECTED): NOT DETECTED
LYMPHOCYTES # BLD AUTO: 1.1 10E3/UL (ref 0.8–5.3)
LYMPHOCYTES NFR BLD AUTO: 9 %
MCH RBC QN AUTO: 28.3 PG (ref 26.5–33)
MCHC RBC AUTO-ENTMCNC: 33.5 G/DL (ref 31.5–36.5)
MCV RBC AUTO: 85 FL (ref 78–100)
MONOCYTES # BLD AUTO: 0.9 10E3/UL (ref 0–1.3)
MONOCYTES NFR BLD AUTO: 7 %
NEUTROPHILS # BLD AUTO: 10.4 10E3/UL (ref 1.6–8.3)
NEUTROPHILS NFR BLD AUTO: 83 %
NRBC # BLD AUTO: 0 10E3/UL
NRBC BLD AUTO-RTO: 0 /100
PLATELET # BLD AUTO: 198 10E3/UL (ref 150–450)
POTASSIUM SERPL-SCNC: 4 MMOL/L (ref 3.4–5.3)
RBC # BLD AUTO: 3.74 10E6/UL (ref 4.4–5.9)
SODIUM SERPL-SCNC: 138 MMOL/L (ref 135–145)
STAPHYLOCOCCUS AUREUS: NOT DETECTED
STAPHYLOCOCCUS EPIDERMIDIS: NOT DETECTED
STAPHYLOCOCCUS LUGDUNENSIS: NOT DETECTED
STAPHYLOCOCCUS SPECIES: NOT DETECTED
STREPTOCOCCUS AGALACTIAE: NOT DETECTED
STREPTOCOCCUS ANGINOSUS GROUP: DETECTED
STREPTOCOCCUS PNEUMONIAE: NOT DETECTED
STREPTOCOCCUS PYOGENES: NOT DETECTED
WBC # BLD AUTO: 12.5 10E3/UL (ref 4–11)

## 2025-02-19 PROCEDURE — 99232 SBSQ HOSP IP/OBS MODERATE 35: CPT | Mod: AS | Performed by: SURGERY

## 2025-02-19 PROCEDURE — 87070 CULTURE OTHR SPECIMN AEROBIC: CPT | Performed by: HOSPITALIST

## 2025-02-19 PROCEDURE — 250N000011 HC RX IP 250 OP 636: Performed by: HOSPITALIST

## 2025-02-19 PROCEDURE — 272N000042 US PERITONEAL/RETROPERITONEAL DRAIN PLACEMENT NON-TUNNELED

## 2025-02-19 PROCEDURE — 87040 BLOOD CULTURE FOR BACTERIA: CPT | Performed by: HOSPITALIST

## 2025-02-19 PROCEDURE — 250N000013 HC RX MED GY IP 250 OP 250 PS 637: Performed by: PHYSICIAN ASSISTANT

## 2025-02-19 PROCEDURE — 82565 ASSAY OF CREATININE: CPT | Performed by: HOSPITALIST

## 2025-02-19 PROCEDURE — 250N000011 HC RX IP 250 OP 636: Performed by: NURSE PRACTITIONER

## 2025-02-19 PROCEDURE — 84132 ASSAY OF SERUM POTASSIUM: CPT | Performed by: HOSPITALIST

## 2025-02-19 PROCEDURE — 87075 CULTR BACTERIA EXCEPT BLOOD: CPT | Performed by: HOSPITALIST

## 2025-02-19 PROCEDURE — 0F9230Z DRAINAGE OF LEFT LOBE LIVER WITH DRAINAGE DEVICE, PERCUTANEOUS APPROACH: ICD-10-PCS | Performed by: INTERNAL MEDICINE

## 2025-02-19 PROCEDURE — 250N000009 HC RX 250: Performed by: HOSPITALIST

## 2025-02-19 PROCEDURE — 87205 SMEAR GRAM STAIN: CPT | Performed by: HOSPITALIST

## 2025-02-19 PROCEDURE — 85018 HEMOGLOBIN: CPT | Performed by: HOSPITALIST

## 2025-02-19 PROCEDURE — 99232 SBSQ HOSP IP/OBS MODERATE 35: CPT | Performed by: HOSPITALIST

## 2025-02-19 PROCEDURE — 36415 COLL VENOUS BLD VENIPUNCTURE: CPT | Performed by: HOSPITALIST

## 2025-02-19 PROCEDURE — 99207 PR APP CREDIT; MD BILLING SHARED VISIT: CPT | Performed by: PHYSICIAN ASSISTANT

## 2025-02-19 PROCEDURE — 250N000009 HC RX 250: Performed by: NURSE PRACTITIONER

## 2025-02-19 PROCEDURE — 99222 1ST HOSP IP/OBS MODERATE 55: CPT | Performed by: INTERNAL MEDICINE

## 2025-02-19 PROCEDURE — 85025 COMPLETE CBC W/AUTO DIFF WBC: CPT | Performed by: HOSPITALIST

## 2025-02-19 PROCEDURE — 250N000013 HC RX MED GY IP 250 OP 250 PS 637: Performed by: INTERNAL MEDICINE

## 2025-02-19 PROCEDURE — 120N000001 HC R&B MED SURG/OB

## 2025-02-19 PROCEDURE — C1769 GUIDE WIRE: HCPCS

## 2025-02-19 RX ORDER — NALOXONE HYDROCHLORIDE 0.4 MG/ML
0.2 INJECTION, SOLUTION INTRAMUSCULAR; INTRAVENOUS; SUBCUTANEOUS
Status: DISCONTINUED | OUTPATIENT
Start: 2025-02-19 | End: 2025-02-24 | Stop reason: HOSPADM

## 2025-02-19 RX ORDER — FENTANYL CITRATE 50 UG/ML
25-50 INJECTION, SOLUTION INTRAMUSCULAR; INTRAVENOUS EVERY 5 MIN PRN
Status: DISCONTINUED | OUTPATIENT
Start: 2025-02-19 | End: 2025-02-22

## 2025-02-19 RX ORDER — NALOXONE HYDROCHLORIDE 0.4 MG/ML
0.4 INJECTION, SOLUTION INTRAMUSCULAR; INTRAVENOUS; SUBCUTANEOUS
Status: DISCONTINUED | OUTPATIENT
Start: 2025-02-19 | End: 2025-02-24 | Stop reason: HOSPADM

## 2025-02-19 RX ORDER — FLUMAZENIL 0.1 MG/ML
0.2 INJECTION, SOLUTION INTRAVENOUS
Status: DISCONTINUED | OUTPATIENT
Start: 2025-02-19 | End: 2025-02-24 | Stop reason: HOSPADM

## 2025-02-19 RX ADMIN — ACETAMINOPHEN 650 MG: 325 TABLET, FILM COATED ORAL at 14:20

## 2025-02-19 RX ADMIN — PIPERACILLIN AND TAZOBACTAM 3.38 G: 3; .375 INJECTION, POWDER, FOR SOLUTION INTRAVENOUS at 06:06

## 2025-02-19 RX ADMIN — PIPERACILLIN AND TAZOBACTAM 3.38 G: 3; .375 INJECTION, POWDER, FOR SOLUTION INTRAVENOUS at 23:31

## 2025-02-19 RX ADMIN — PIPERACILLIN AND TAZOBACTAM 3.38 G: 3; .375 INJECTION, POWDER, FOR SOLUTION INTRAVENOUS at 00:18

## 2025-02-19 RX ADMIN — SODIUM CHLORIDE AND POTASSIUM CHLORIDE: .9; .15 SOLUTION INTRAVENOUS at 04:00

## 2025-02-19 RX ADMIN — PIPERACILLIN AND TAZOBACTAM 3.38 G: 3; .375 INJECTION, POWDER, FOR SOLUTION INTRAVENOUS at 13:44

## 2025-02-19 RX ADMIN — MIDAZOLAM 1 MG: 1 INJECTION INTRAMUSCULAR; INTRAVENOUS at 12:08

## 2025-02-19 RX ADMIN — PANTOPRAZOLE SODIUM 40 MG: 40 INJECTION, POWDER, FOR SOLUTION INTRAVENOUS at 21:08

## 2025-02-19 RX ADMIN — LEVOTHYROXINE SODIUM 50 MCG: 0.05 TABLET ORAL at 09:08

## 2025-02-19 RX ADMIN — DILTIAZEM HYDROCHLORIDE 240 MG: 120 CAPSULE, COATED, EXTENDED RELEASE ORAL at 09:08

## 2025-02-19 RX ADMIN — LIDOCAINE HYDROCHLORIDE 10 ML: 10 INJECTION, SOLUTION EPIDURAL; INFILTRATION; INTRACAUDAL; PERINEURAL at 12:24

## 2025-02-19 RX ADMIN — PIPERACILLIN AND TAZOBACTAM 3.38 G: 3; .375 INJECTION, POWDER, FOR SOLUTION INTRAVENOUS at 17:51

## 2025-02-19 RX ADMIN — PANTOPRAZOLE SODIUM 40 MG: 40 INJECTION, POWDER, FOR SOLUTION INTRAVENOUS at 09:08

## 2025-02-19 RX ADMIN — FENTANYL CITRATE 50 MCG: 50 INJECTION, SOLUTION INTRAMUSCULAR; INTRAVENOUS at 12:08

## 2025-02-19 ASSESSMENT — ACTIVITIES OF DAILY LIVING (ADL)
ADLS_ACUITY_SCORE: 27
ADLS_ACUITY_SCORE: 29
ADLS_ACUITY_SCORE: 27
ADLS_ACUITY_SCORE: 29
ADLS_ACUITY_SCORE: 27
ADLS_ACUITY_SCORE: 29
ADLS_ACUITY_SCORE: 27
ADLS_ACUITY_SCORE: 29
ADLS_ACUITY_SCORE: 29
ADLS_ACUITY_SCORE: 27
ADLS_ACUITY_SCORE: 29
ADLS_ACUITY_SCORE: 29
ADLS_ACUITY_SCORE: 27
ADLS_ACUITY_SCORE: 29
ADLS_ACUITY_SCORE: 29
ADLS_ACUITY_SCORE: 27
ADLS_ACUITY_SCORE: 29
ADLS_ACUITY_SCORE: 27
ADLS_ACUITY_SCORE: 27

## 2025-02-19 NOTE — PROGRESS NOTES
"GASTROENTEROLOGY PROGRESS NOTE     SUBJECTIVE:  Endorses some mild epigastric pain. No nausea, vomiting. Afebrile.      OBJECTIVE:  /68 (BP Location: Right arm)   Pulse 80   Temp 98.7  F (37.1  C) (Oral)   Resp 17   Ht 1.803 m (5' 11\")   Wt 96.1 kg (211 lb 12.8 oz)   SpO2 95%   BMI 29.54 kg/m    Temp (24hrs), Av.7  F (37.6  C), Min:98  F (36.7  C), Max:103.1  F (39.5  C)    Patient Vitals for the past 72 hrs:   Weight   25 0013 96.1 kg (211 lb 12.8 oz)   25 1659 94.8 kg (208 lb 15.9 oz)       Intake/Output Summary (Last 24 hours) at 2025 0849  Last data filed at 2025 0606  Gross per 24 hour   Intake 1508 ml   Output --   Net 1508 ml        PHYSICAL EXAM  GENERAL: No obvious distress  EYES: No scleral icterus  ABDOMEN: Non-distended. Positive bowel sounds. Soft. Non-tender.  SKIN: No jaundice  NEUROLOGIC: Alert and oriented. No asterixis.   PSYCHIATRIC: Normal affect     Additional Comments:  ROS, FH, SH: See initial GI consult for details.     I have reviewed the patient's new clinical lab results:     Recent Labs   Lab Test 25  0539   WBC 12.5* 9.3 10.1   HGB 10.6* 11.2* 11.4*   MCV 85 84 85    217 232   INR  --   --  1.36*     Recent Labs   Lab Test 25  0624 25  0539   POTASSIUM 4.0 3.8 3.8   CHLORIDE 104 102 101   CO2 23 22 20*   BUN 16.1 16.8 25.1*   ANIONGAP 11 12 12     Recent Labs   Lab Test 25  0539 25  1716 25  0851 24  1041   ALBUMIN 3.2* 3.0*  --  4.0   < > 4.3   BILITOTAL 0.3 0.3  --  0.7   < > 1.3*   ALT 60 54  --  53   < > 391*   AST 71* 61*  --  54*   < > 139*   PROTEIN  --   --  70*  --   --   --    LIPASE  --   --   --  28  --  18    < > = values in this interval not displayed.       IMAGING:   MR ABDOMEN MRCP W/O and W CONTRAST 2025  IMPRESSION:  1. Inflammatory changes centered around the proximal duodenum with enlarged adjacent " lymph node, favor sequela of duodenitis or ulcer, could consider direct visualization with endoscopy if clinically indicated.  2. Caudate hepatic lesion as above, could represent an abscess in the setting of #1 and elevated WBC count but recommend sampling and/or radiographic follow-up to ensure resolution to exclude neoplasm.  3. No measurable pancreatic mass visualized.    CTAP w/IV contrast 2/18/2025  IMPRESSION:  1.  Slightly increasing right upper quadrant inflammatory changes centered about the proximal duodenum with a suspected contained perforation arising posteriorly off the junction of the first and second duodenal segments. Of note, the caudate lobe lesion   is located superjacent to the contained perforation and the differential favors penetrating disease resulting in a hepatic abscess which is stable in size.  2.  New small volume ascites.    ENDOSCOPIC EVALUATION:  No previous upper endoscopy.     ASSESSMENT:  64 year old with past medical history of HTN, HLD, STEMI, CAD s/p PCI x 2, hypothyroidism who presented with sepsis of unknown etiology and acute renal failure secondary to dehydration.     CTAP 2/16 with inflammatory changes around proximal duodenum. MRCP with inflammatory changes around proximal duodenum with enlarged adjacent lymph nodes. Additionally, MRCP notes caudate hepatic lesion which could represent abscess. AFP normal.     Initial WBC 17.1, today 12.5. Blood cultures thus far negative.     Case discussed with Dr. Ramsey yesterday. With concern for potential perforation, EGD at this time would be too risky. H pylori stool test negative.      Patient undergoing IR drainage of abscess. Per surgery, if patient needs surgical intervention likely best served by hepatobiliary surgeon.     PLAN:   - IV antibiotics  - PPI  - Avoid NSAIDs  - NPO. Diet per surgery.    Discussed patient findings and plan with Dr. Ramsey.     Total time: 25 minutes of total time was spent providing patient care,  including patient evaluation, reviewing documentation/test results, and .     Amna Robles PA-C  [facility] ([facility])

## 2025-02-19 NOTE — PLAN OF CARE
"Goal Outcome Evaluation:      Plan of Care Reviewed With: patient, spouse    Overall Patient Progress: improvingOverall Patient Progress: improving    Outcome Evaluation: AOx4. Up ad olivier. Denied pain. Max T 99.2. PRN tylenol x1. WBC-12.5. Tolerated Reg diet. IR placed NADIA drain in hepatic abscess. BC positive- hospitalist notified, ID consulted and saw pt- continue IV zosyn and pend abscess cx and BC.- Repeat BC drawn.      Problem: Adult Inpatient Plan of Care  Goal: Plan of Care Review  Description: The Plan of Care Review/Shift note should be completed every shift.  The Outcome Evaluation is a brief statement about your assessment that the patient is improving, declining, or no change.  This information will be displayed automatically on your shift  note.  Outcome: Progressing  Flowsheets (Taken 2/19/2025 1505)  Outcome Evaluation: AOx4. Up ad olivier. Denied pain. Max T 99.2. PRN tylenol x1. WBC-12.5. Tolerated Reg diet. IR placed NADIA drain in hepatic abscess. BC positive- hospitalist notified, ID consulted and saw pt- continue IV zosyn and pend abscess cx and BC.- Repeat BC drawn.  Plan of Care Reviewed With:   patient   spouse  Overall Patient Progress: improving  Goal: Patient-Specific Goal (Individualized)  Description: You can add care plan individualizations to a care plan. Examples of Individualization might be:  \"Parent requests to be called daily at 9am for status\", \"I have a hard time hearing out of my right ear\", or \"Do not touch me to wake me up as it startles  me\".  Outcome: Progressing  Goal: Absence of Hospital-Acquired Illness or Injury  Outcome: Progressing  Intervention: Identify and Manage Fall Risk  Recent Flowsheet Documentation  Taken 2/19/2025 0945 by Kristyn Seymour RN  Safety Promotion/Fall Prevention:   nonskid shoes/slippers when out of bed   safety round/check completed  Intervention: Prevent Skin Injury  Recent Flowsheet Documentation  Taken 2/19/2025 0945 by Kristyn Seymour RN  Body " Position: position changed independently  Intervention: Prevent Infection  Recent Flowsheet Documentation  Taken 2/19/2025 0945 by Kristyn Seymour RN  Infection Prevention:   cohorting utilized   hand hygiene promoted   single patient room provided   rest/sleep promoted  Goal: Optimal Comfort and Wellbeing  Outcome: Progressing  Goal: Readiness for Transition of Care  Outcome: Progressing

## 2025-02-19 NOTE — CONSULTS
[facility]    Infectious Disease Consultation     Date of Admission:  2/16/2025  Date of Consult (When I saw the patient): 02/19/25    Assessment & Plan   Victoriano Bourgeois is a 64 year old male who was admitted on 2/16/2025.     Impression:  55 yo with PMH of HTN, HLD, STEMI, CAD s/p PCI x 2, and hypothyroidism   Presented with reported fever, chills   MRCP/CT show: duodenitis vs ulcer with some air seen on CT   Repeated CT with contrast: likely contained duodenal perforation with hepatic abscess  S/p drain placement   On zosyn   Blood culture with strep intermedius     Recommendations   For now zosyn   Follow up on the abscesses cultures   Further recommendations based on follow up blood culture and abscesses cultures     Dank Camarena MD    Reason for Consult   Reason for consult: I was asked to evaluate this patient for bacteremia, intraabdominal infection, liver abscess .    Primary Care Physician   Jessika Bernal    Chief Complaint   Fevers and chills     History is obtained from the patient and medical records    History of Present Illness   Victoriano Bourgeois is a 64 year old male who presents with ongoing abdominal symptoms for many months.  With recent symptoms of fevers and chills that led to presentation to the ER    Past Medical History   I have reviewed this patient's medical history and updated it with pertinent information if needed.   Past Medical History:   Diagnosis Date    Coronary artery disease     Heart attack (H)     Hypertension     Mixed hyperlipidemia     Stented coronary artery     Thyroid disease        Past Surgical History   I have reviewed this patient's surgical history and updated it with pertinent information if needed.  Past Surgical History:   Procedure Laterality Date    CARDIAC SURGERY  2019    2 cardiac stents    ENT SURGERY      tonsillectomy    LAPAROSCOPIC CHOLECYSTECTOMY WITH CHOLANGIOGRAMS N/A 4/5/2024    Procedure: CHOLECYSTECTOMY, LAPAROSCOPIC, WITH  CHOLANGIOGRAM;  Surgeon: Kain Herbert MD;  Location: [facility] OR    ORTHOPEDIC SURGERY  2019    knee scope    PARATHYROIDECTOMY  2022       Prior to Admission Medications   Prior to Admission Medications   Prescriptions Last Dose Informant Patient Reported? Taking?   aspirin 81 MG EC tablet   Yes No   Sig: Take 81 mg by mouth daily   cetirizine (ZYRTEC) 10 MG tablet   Yes Yes   Sig: Take 10 mg by mouth daily as needed for allergies.   diltiazem ER (DILT-XR) 240 MG 24 hr ER beaded capsule 2/16/2025 Morning  Yes Yes   Sig: Take 240 mg by mouth daily.   evolocumab (REPATHA) 140 MG/ML prefilled autoinjector 2/10/2025  Yes Yes   Sig: Inject 140 mg subcutaneously every 14 days.   levothyroxine (SYNTHROID/LEVOTHROID) 50 MCG tablet 2/16/2025 Morning  Yes Yes   Sig: Take 50 mcg by mouth every morning (before breakfast).   losartan (COZAAR) 100 MG tablet 2/16/2025 Morning  Yes Yes   Sig: Take 100 mg by mouth daily   nitroGLYcerin (NITROSTAT) 0.4 MG sublingual tablet   Yes Yes   Sig: Place 0.4 mg under the tongue as needed   omeprazole (PRILOSEC) 20 MG DR capsule   Yes Yes   Sig: Take 20 mg by mouth daily as needed.      Facility-Administered Medications: None     Allergies   Allergies   Allergen Reactions    Chlorthalidone Muscle Pain (Myalgia)     Cramping severe    Metoprolol Other (See Comments)     Bloating, weight gain, heart burn.    Amlodipine Rash    Carvedilol Other (See Comments)     Wt gain, bloating, loss of appetite    Isosorbide Nitrate Headache    Codeine Unknown     Other Reaction(s): *Unknown    Statins Muscle Pain (Myalgia)    Atorvastatin Muscle Pain (Myalgia)       Immunization History   Immunization History   Administered Date(s) Administered    COVID-19 MONOVALENT 12+ (Pfizer) 11/16/2021, 12/07/2021       Social History   I have reviewed this patient's social history and updated it with pertinent information if needed. Victoriano Bourgeois  reports that he quit smoking about 8 years ago. His smoking use  "included cigarettes. He has never used smokeless tobacco. He reports current alcohol use. He reports that he does not use drugs.    Family History   I have reviewed this patient's family history and updated it with pertinent information if needed.   No family history on file.    Review of Systems   The 10 point Review of Systems is negative    Physical Exam   Temp: 99.2  F (37.3  C) Temp src: Oral BP: 130/68 Pulse: 74   Resp: 17 SpO2: 97 % O2 Device: None (Room air) Oxygen Delivery: 2 LPM  Vital Signs with Ranges  Temp:  [98  F (36.7  C)-103.1  F (39.5  C)] 99.2  F (37.3  C)  Pulse:  [60-94] 74  Resp:  [16-20] 17  BP: ()/(47-84) 130/68  SpO2:  [94 %-97 %] 97 %  211 lbs 12.8 oz  Body mass index is 29.54 kg/m .    GENERAL APPEARANCE:  awake  EYES: Eyes grossly normal to inspection  NECK: no adenopathy  RESP: lungs clear   CV: regular rates and rhythm  LYMPHATICS: normal ant/post cervical and supraclavicular nodes  ABDOMEN: soft drain in place   MS: extremities normal  SKIN: no suspicious lesions or rashes        Data   All laboratory and imaging data in the past 24 hours reviewed  No results for input(s): \"CULT\" in the last 168 hours.  No lab results found.    Invalid input(s): \"UC\"       All cultures:  Recent Labs   Lab 02/16/25  2001 02/16/25  1900 02/16/25  1716   CULTURE <10,000 CFU/mL Mixture of Urogenital Oksana Positive on the 2nd day of incubation*  Streptococcus intermedius* No growth after 2 days      Blood culture:  Results for orders placed or performed during the hospital encounter of 02/16/25   Blood Culture Arm, Right    Collection Time: 02/16/25  7:00 PM    Specimen: Arm, Right; Blood   Result Value Ref Range    Culture Positive on the 2nd day of incubation (A)     Culture Streptococcus intermedius (AA)    Blood Culture Peripheral Blood    Collection Time: 02/16/25  5:16 PM    Specimen: Peripheral Blood   Result Value Ref Range    Culture No growth after 2 days       Urine culture:  Results for " orders placed or performed during the hospital encounter of 02/16/25   Urine Culture    Collection Time: 02/16/25  8:01 PM    Specimen: Urine, NOS   Result Value Ref Range    Culture <10,000 CFU/mL Mixture of Urogenital Oksana

## 2025-02-19 NOTE — PROGRESS NOTES
"[facility]   General Surgery Progress Note           Assessment and Plan:   Assessment:   Sepsis, suspected contained duodenal perforation with resulting hepatic abscess  2/18 H. Pylori negative       Plan:   -NPO for IR drainage of hepatic abscess  -abx: IV Zosyn  -PPI  -continue non-operative management       Physician Attestation     I saw and evaluated Victoriano Bourgeois as part of a shared APRN/PA visit.     I personally reviewed the vital signs, medications, labs, and imaging.    I personally provided a substantive portion of care for this patient and I approve the care plan as written by the STEPHANIE.  I was involved with Medical Decision Making including:  Quang is stable with minimal abdominal pain. Hepatic drain placed today by IR. Discussed diagnosis/pathology and plan with patient and his wife. No plan for surgery. Ok to resume diet. Told patient if he has pain with eating we may have to go back to clear or full liquid. Recommend follow up EGD in 6 weeks    Guerline Freed MD  Date of Service (when I saw the patient): 02/19/25        Interval History:   Tmax 103 yesterday evening. Afebrile so far today, slept well and feeling much improved. Denies abd pain. Thirsty but otherwise no c/o. Discussed plan for IR procedure today.         Physical Exam:   Blood pressure 118/68, pulse 80, temperature 98.7  F (37.1  C), temperature source Oral, resp. rate 17, height 1.803 m (5' 11\"), weight 96.1 kg (211 lb 12.8 oz), SpO2 95%.    I/O last 3 completed shifts:  In: 1508 [P.O.:120; I.V.:1188; IV Piggyback:200]  Out: -     Abdomen:   soft, non-distended, non-tender            Data:     Recent Labs   Lab 02/19/25  0623 02/18/25  0624 02/17/25  0539   WBC 12.5* 9.3 10.1   HGB 10.6* 11.2* 11.4*   HCT 31.6* 33.2* 34.5*   MCV 85 84 85    217 232       Asha Ford PA-C  Text page: [phone] 8 am to 4 pm  After 4 pm, call [phone]       "

## 2025-02-19 NOTE — PROVIDER NOTIFICATION
DATE/TIME OF CALL RECEIVED FROM LAB:  02/19/25 at 12:47 PM   LAB TEST:  BC  LAB VALUE:  Positive for gram positive cocci in pairs and chains  PROVIDER NOTIFIED?: Yes  PROVIDER NAME: Dr. Mullins  DATE/TIME LAB VALUE REPORTED TO PROVIDER: 12:48 PM   MECHANISM OF PROVIDER NOTIFICATION: Face-To-Face  PROVIDER RESPONSE: Aware- order for repeat BC placed

## 2025-02-19 NOTE — PROCEDURES
[facility]    Procedure: IR Procedure Note    Date/Time: 2/19/2025 3:32 PM    Performed by: Winston Goldberg MD  Authorized by: Winston Goldberg MD  IR Fellow Physician:    Pre Procedure Diagnosis: Hepatic Caudate Lobe abscess  Post Procedure Diagnosis: Hepatic Caudate Lobe abscess    UNIVERSAL PROTOCOL   Site Marked: Yes  Prior Images Obtained and Reviewed:  Yes  Required items: Required blood products, implants, devices and special equipment available    Patient identity confirmed:  Verbally with patient, arm band and provided demographic data  Patient was reevaluated immediately before administering moderate or deep sedation or anesthesia  Confirmation Checklist:  Patient's identity using two indicators, relevant allergies, procedure was appropriate and matched the consent or emergent situation and correct equipment/implants were available  Time out: Immediately prior to the procedure a time out was called    Universal Protocol: the Joint Commission Universal Protocol was followed    Preparation: Patient was prepped and draped in usual sterile fashion    ESBL (mL):  3     ANESTHESIA    Anesthesia:  Local infiltration  Local Anesthetic:  Lidocaine 1% without epinephrine  Anesthetic Total (mL):  20      SEDATION  Patient Sedated: Yes    Sedation Type:  Moderate (conscious) sedation  Sedation:  Fentanyl and midazolam  Vital signs: Vital signs monitored during sedation    Findings: Subxyphoid approach with 10 Fr drain placement into caudate lobe via the left hepatic lobe (3 capsular punctures not ideal, though few other options for access to abscess).  Catheter to be flushed 10 ml NS every 8 hours.  No complications.  Tolerated well.    Specimens: none    Procedural Complications: None    Condition: Stable      PROCEDURE    Patient Tolerance:  Patient tolerated the procedure well with no immediate complications  Length of time physician/provider present for 1:1 monitoring during  sedation:  38-52 min

## 2025-02-19 NOTE — PROGRESS NOTES
Patient tolerated hepatic abscess drain placement well by Dr. Goldberg.  1 mg of versed and 50 mcg of fentanyl given, total sedation time 22 minutes.  6 mls of purulent fluid was collected and sent to lab for processing.  Stayfix dressing applied and remains clean dry and intact.   10 Monegasque pigtail drain to NADIA suction.  Report called to bedside RN.  Patient to nursing unit via cart in stable condition.

## 2025-02-19 NOTE — CONSULTS
Patient is on US or CT schedule today Wednesday 2/19/25 for a liver abscess drain placement with IV moderate sedation.     Victoriano Bourgeois is a 64 year old male admitted on 2/16/2025. He has PMHx most notable for HTN, HLD, STEMI, CAD s/p PCI x 2, and hypothyroidism that presents with reported fever, chills and has findings concerning for sepsis of unknown source and acute renal failure secondary to dehydration.     Suspect intra-abdominal infection  MRCP/CT show: duodenitis vs ulcer with some air seen on CT ? Perforation    Liver abscess drain request made as patient has had continued, intermittent rigors.     -Labs WNL for procedure.    -Orders for NPO been entered.   -Consent will be done prior to procedure.     Temp:  [98  F (36.7  C)-103.1  F (39.5  C)] 98.7  F (37.1  C)  Pulse:  [60-94] 80  Resp:  [16-20] 17  BP: ()/(57-83) 118/68  SpO2:  [94 %-97 %] 95 %    ROUTINE ICU LABS (Last four results)  CMP  Recent Labs   Lab 02/19/25 0623 02/18/25 0624 02/17/25 1954 02/17/25 0539 02/16/25  1716    136 134* 133* 136   POTASSIUM 4.0 3.8  --  3.8 4.0   CHLORIDE 104 102  --  101 97*   CO2 23 22  --  20* 22   ANIONGAP 11 12  --  12 17*   * 111*  --  128* 138*   BUN 16.1 16.8  --  25.1* 25.0*   CR 1.16 1.05 1.17 1.44* 2.03*   GFRESTIMATED 70 79 69 54* 36*   ТАТЬЯНА 8.1* 8.1*  --  8.1* 9.3   PROTTOTAL  --  5.7*  --  5.9* 7.5   ALBUMIN  --  3.2*  --  3.0* 4.0   BILITOTAL  --  0.3  --  0.3 0.7   ALKPHOS  --  43  --  48 62   AST  --  71*  --  61* 54*   ALT  --  60  --  54 53     CBC  Recent Labs   Lab 02/19/25 0623 02/18/25 0624 02/17/25  0539 02/16/25  1716   WBC 12.5* 9.3 10.1 17.1*   RBC 3.74* 3.95* 4.07* 4.90   HGB 10.6* 11.2* 11.4* 13.8   HCT 31.6* 33.2* 34.5* 41.7   MCV 85 84 85 85   MCH 28.3 28.4 28.0 28.2   MCHC 33.5 33.7 33.0 33.1   RDW 13.3 13.2 13.2 13.2    217 232 303     INR  Recent Labs   Lab 02/17/25  0539   INR 1.36*     Arterial Blood GasNo lab results found in last 7  days.    Please contact the US or CT department for procedural related questions.     Reviewed and approved by IR Dr Baron today.     Total time: 20 minutes     Thanks, Kathryn [facility] Interventional Radiology CNP ([phone]) (phone [phone])

## 2025-02-19 NOTE — UTILIZATION REVIEW
"  Admission Status; Secondary Review Determination         Under the authority of the Utilization Management Committee, the utilization review process indicated a secondary review on the above patient.  The review outcome is based on review of the medical records, discussions with staff, and applying clinical experience noted on the date of the review.        (xxx)      Inpatient Status Appropriate - This patient's medical care is consistent with medical management for inpatient care and reasonable inpatient medical practice.      () Observation Status Appropriate - This patient does not meet hospital inpatient criteria and is placed in observation status. If this patient's primary payer is Medicare and was admitted as an inpatient, Condition Code 44 should be used and patient status changed to \"observation\".   () Admission Status NOT Appropriate - This patient's medical care is not consistent with medical management for Inpatient or Observation Status.          RATIONALE FOR DETERMINATION     64 year old male admitted on 2/16/2025. He has PMHx most notable for HTN, HLD, STEMI, CAD s/p PCI x 2, and hypothyroidism that presents with reported fever, chills and has findings concerning for sepsis of unknown source and acute renal failure secondary to dehydration.   MRCP/CT show: duodenitis vs ulcer with some air seen on CT   Repeated CT with contrast: likely contained duodenal perforation with hepatic abscess  IR placed drain today given ongoing fevers and CT findings.  Documented fevers here of 103.1.  WBC and CRP elevated.  BP 90s/50s today.  He is receiving close clinical monitoring, broad spectrum IV antibiotics, IVF.  IP status is appropriate.    The severity of illness, intensity of service provided, expected LOS and risk for adverse outcome make the care complex, high risk and appropriate for hospital admission.        The information on this document is developed by the utilization review team in order for the " business office to ensure compliance.  This only denotes the appropriateness of proper admission status and does not reflect the quality of care rendered.         The definitions of Inpatient Status and Observation Status used in making the determination above are those provided in the CMS Coverage Manual, Chapter 1 and Chapter 6, section 70.4.      Sincerely,     Ros Chambers MD  Physician Advisor   Utilization Review/ Case Management  [facility].

## 2025-02-19 NOTE — PLAN OF CARE
Goal Outcome Evaluation:      Plan of Care Reviewed With: patient    Overall Patient Progress: improvingOverall Patient Progress: improving    Outcome Evaluation: A&Ox4. Ambulates independently. Denies pain or discomfort. Oral temp improved after Tylenol and Ibuprofen. IVF continued. IV antibiotics infused. NPO after midnight pending CT w/ contrast and possible IR procedure today.      Problem: Adult Inpatient Plan of Care  Goal: Plan of Care Review  Outcome: Progressing  Flowsheets (Taken 2/19/2025 0342)  Outcome Evaluation: A&Ox4. Ambulates independently. Denies pain or discomfort. Oral temp improved after Tylenol and Ibuprofen. IVF continued. IV antibiotics infused. NPO after midnight pending CT w/ contrast and possible IR procedure today.  Plan of Care Reviewed With: patient  Overall Patient Progress: improving  Goal: Patient-Specific Goal (Individualized)  Outcome: Progressing  Goal: Absence of Hospital-Acquired Illness or Injury  Outcome: Progressing  Intervention: Identify and Manage Fall Risk  Recent Flowsheet Documentation  Taken 2/18/2025 1943 by Tata Lorenzana RN  Safety Promotion/Fall Prevention: nonskid shoes/slippers when out of bed  Intervention: Prevent Skin Injury  Recent Flowsheet Documentation  Taken 2/18/2025 1943 by Tata Lorenzana RN  Body Position: position changed independently  Goal: Optimal Comfort and Wellbeing  Outcome: Progressing  Goal: Readiness for Transition of Care  Outcome: Progressing     Problem: Infection  Goal: Absence of Infection Signs and Symptoms  Outcome: Progressing     Problem: Fatigue  Goal: Improved Activity Tolerance  Outcome: Progressing  Intervention: Promote Improved Energy  Recent Flowsheet Documentation  Taken 2/18/2025 1943 by Tata Lorenzana, RN  Activity Management:   up ad olivier   ambulated to bathroom     Problem: Comorbidity Management  Goal: Blood Pressure in Desired Range  Outcome: Progressing  Intervention: Maintain Blood Pressure Management  Recent  Flowsheet Documentation  Taken 2/18/2025 1943 by Tata Lorenzana, RN  Medication Review/Management: medications reviewed

## 2025-02-19 NOTE — PROGRESS NOTES
[facility]    Medicine Progress Note - Hospitalist Service    Date of Admission:  2/16/2025    Assessment & Plan      Victoriano Bourgeois is a 64 year old male admitted on 2/16/2025. He has PMHx most notable for HTN, HLD, STEMI, CAD s/p PCI x 2, and hypothyroidism that presents with reported fever, chills and has findings concerning for sepsis of unknown source and acute renal failure secondary to dehydration.    Suspect intra-abdominal infection  MRCP/CT show: duodenitis vs ulcer with some air seen on CT   Repeated CT with contrast: likely contained duodenal perforation with hepatic abscess  To IR for drain today    Severe Sepsis with Associated Organ Dysfunction of Acute renal failure and Lactic acidosis   Likely intra-abdominal source  Possible perforated ulcer/duodenitis    - patient presented with fever (to 104) and chills, fatigue and a few episodes of diarrhea    - decreased appetite x months    - UA-culture/CXR unrevealing    - blood cultures negative    - still having fevers: last 100.9 at 7am (but also getting Tylenol regularly)    - CT abdo/pelvis: ill-defined soft tissue with inflammatory changes and air noted adjacent to pancreatic head and second portion of duodenum which could be related duodenitis, pancreatitis or malignancy    - MRCP: duodenitis vs ulcer    - received ceftriaxone x 2 days, now changed to zosyn on 2/18  to cover intra-abdominal infection    - interestingly patient denies abdominal pain and does not have tenderness on exam    - CT with contrast 2/18 showed likely contained duodenal perforation with hepatic abscess    - GI/Surgery involved    - IR for drain    - started IV PPI BID     Anion gap metabolic acidosis    - resolved    Acute renal failure  Chronic kidney disease II    - likely due to dehydration and poor p.o. intake    - resolved    - cont to hold losartan as BPs are low normal    HTN  HLD  CAD s/p PCI x 2 in 2019  History of STEMI in 2019    - presented to  "[facility] in 2019 with chest pain and was found to have inferior STEMI and had stents placed in the mid circumflex and obtuse marginal artery    - continue PTA diltiazem and ASA 81 mg (holding ASA in case of procedure/surgery)    - patient is intolerant of statins    - on alirocumab every 2 weeks, resume on discharge    Hypothyroidism    - continue PTA Synthroid    Hyponatremia    - mild, 133    - resolved    Will update wife when she arrives      Diet: NPO per Anesthesia Guidelines for Procedure/Surgery Except for: Meds, Ice Chips    DVT Prophylaxis: Pneumatic Compression Devices  Myrick Catheter: Not present  Lines: None     Cardiac Monitoring: None  Code Status: Full Code      Clinically Significant Risk Factors         # Hyponatremia: Lowest Na = 133 mmol/L in last 2 days, will monitor as appropriate  # Hypochloremia: Lowest Cl = 97 mmol/L in last 2 days, will monitor as appropriate      # Hypoalbuminemia: Lowest albumin = 3 g/dL at 2/17/2025  5:39 AM, will monitor as appropriate  # Coagulation Defect: INR = 1.36 (Ref range: 0.85 - 1.15) and/or PTT = N/A, will monitor for bleeding  # Drug Induced Platelet Defect: home medication list includes an antiplatelet medication   # Hypertension: Home medication list includes antihypertensive(s)            # Overweight: Estimated body mass index is 29.54 kg/m  as calculated from the following:    Height as of this encounter: 1.803 m (5' 11\").    Weight as of this encounter: 96.1 kg (211 lb 12.8 oz)., PRESENT ON ADMISSION            Social Drivers of Health    Tobacco Use: Medium Risk (2/16/2025)    Received from [email or web address]    Patient History     Smoking Tobacco Use: Former     Smokeless Tobacco Use: Never    Received from [employer] [employer]    Social Connections          Disposition Plan     Medically Ready for Discharge: Anticipated in 2-4 Days    The patient's care was discussed with the " "Bedside Nurse, Patient, and Patient's Family.    Lino Mullins MD  Hospitalist Service  [facility]  Securely message with [employer] (more info)  Text page via [employer] Paging/Directory   ______________________________________________________________________    Interval History   Patient is in bed. Doing well. Slept well. Would like to shower. Mild RUQ discomfort. No chest pain or sob.  TMax 103 yesterday evening    Physical Exam   Vital Signs: Temp: 98.7  F (37.1  C) Temp src: Oral BP: 118/68 Pulse: 80   Resp: 17 SpO2: 95 % O2 Device: None (Room air)    Weight: 211 lbs 12.8 oz    GENERAL:  Comfortable.  PSYCH: pleasant, oriented, No acute distress.  HEART:  Normal S1, S2 with no murmur, no pericardial rub, gallops or S3 or S4.  LUNGS:  Clear to auscultation, normal Respiratory effort. No wheezing, rales or ronchi.  GI:  Soft, normal bowel sounds. Mild RUQ \"soreness\"  EXTREMITIES:  No pedal edema, +2 pulses bilateral and equal.  SKIN:  Dry to touch, No rash, wound or ulcerations.    Medical Decision Making       65 MINUTES SPENT BY ME on the date of service doing chart review, history, exam, documentation & further activities per the note.      Data     I have personally reviewed the following data over the past 24 hrs:    12.5 (H)  \   10.6 (L)   / 198     138 104 16.1 /  120 (H)   4.0 23 1.16 \       Imaging results reviewed over the past 24 hrs:   Recent Results (from the past 24 hours)   CT Abdomen Pelvis w Contrast    Narrative    EXAM: CT ABDOMEN PELVIS W CONTRAST  LOCATION: [facility]  DATE: 2/18/2025    INDICATION: possible ulcer with possible perforation, possible liver abscess  COMPARISON: MR abdomen 2/17/2025, CT abdomen pelvis 2/16/2025  TECHNIQUE: CT scan of the abdomen and pelvis was performed following injection of IV contrast. Multiplanar reformats were obtained. Dose reduction techniques were used.  CONTRAST: 100mL Isovue 370    FINDINGS:   LOWER CHEST: " Multivessel coronary artery calcifications. No acute airspace opacity in the lung bases or pleural effusion.    HEPATOBILIARY: Stable heterogeneous 4.1 x 3.7 x 4.1 cm lesion within the caudate lobe, previously measuring 3.8 x 3.6 x 4.3 cm at MRI. Note that small differences in measurements is attributable differences across exam modalities. No new liver lesion.   Patent hepatic and portal venous systems. Mild periportal edema. Cholecystectomy with normal caliber bile ducts.    PANCREAS: No focal splenic lesion or duct dilation.    SPLEEN: Normal.    ADRENAL GLANDS: Normal.    KIDNEYS/BLADDER: Two tiny 1 to 2 mm nonobstructing right renal calculi. No suspicious renal lesion, ureteral stone, or hydronephrosis. Normal bladder.    BOWEL: A slightly increasing right upper quadrant inflammatory changes centered about the proximal duodenum with suspected contained perforation arising posteriorly off the junction of the first and second portions of the duodenum which is most   conspicuous on series 5, images 54-56. The air and fluid collection posterior to the portal vein and a replaced right hepatic artery arising off the superior mesenteric artery measures 1.8 x 3.9 x 2.8 cm (series 3, image 73). Of note, the caudate lobe   lesion is superjacent to this collection and the differential favors penetrating disease resulting in a hepatic abscess. No free intraperitoneal air. New small volume ascites around the liver, gastric greater curvature, within the elvia hepatis and   dependent pelvis. Probably reactive 14 mm elvia hepatic lymph node.    LYMPH NODES: No lymphadenopathy.    VASCULATURE: Mild to moderate abdominal aorta atherosclerosis without aneurysmal dilation.    PELVIC ORGANS: No suspicious pelvic mass.    MUSCULOSKELETAL: No aggressive osseous lesion.        Impression    IMPRESSION:  1.  Slightly increasing right upper quadrant inflammatory changes centered about the proximal duodenum with a suspected contained  perforation arising posteriorly off the junction of the first and second duodenal segments. Of note, the caudate lobe lesion   is located superjacent to the contained perforation and the differential favors penetrating disease resulting in a hepatic abscess which is stable in size.  2.  New small volume ascites.

## 2025-02-20 VITALS
HEIGHT: 71 IN | TEMPERATURE: 100.1 F | WEIGHT: 211.8 LBS | OXYGEN SATURATION: 96 % | BODY MASS INDEX: 29.65 KG/M2 | SYSTOLIC BLOOD PRESSURE: 113 MMHG | RESPIRATION RATE: 18 BRPM | DIASTOLIC BLOOD PRESSURE: 72 MMHG | HEART RATE: 76 BPM

## 2025-02-20 LAB
ANION GAP SERPL CALCULATED.3IONS-SCNC: 10 MMOL/L (ref 7–15)
BACTERIA ABSC ANAEROBE+AEROBE CULT: ABNORMAL
BACTERIA ABSC ANAEROBE+AEROBE CULT: NORMAL
BACTERIA BLD CULT: ABNORMAL
BACTERIA BLD CULT: ABNORMAL
BACTERIA BLD CULT: NORMAL
BASOPHILS # BLD AUTO: 0 10E3/UL (ref 0–0.2)
BASOPHILS NFR BLD AUTO: 0 %
BUN SERPL-MCNC: 14 MG/DL (ref 8–23)
CALCIUM SERPL-MCNC: 8.2 MG/DL (ref 8.8–10.4)
CHLORIDE SERPL-SCNC: 103 MMOL/L (ref 98–107)
CREAT SERPL-MCNC: 1.02 MG/DL (ref 0.67–1.17)
EGFRCR SERPLBLD CKD-EPI 2021: 82 ML/MIN/1.73M2
EOSINOPHIL # BLD AUTO: 0 10E3/UL (ref 0–0.7)
EOSINOPHIL NFR BLD AUTO: 0 %
ERYTHROCYTE [DISTWIDTH] IN BLOOD BY AUTOMATED COUNT: 13.4 % (ref 10–15)
GLUCOSE SERPL-MCNC: 109 MG/DL (ref 70–99)
GRAM STAIN RESULT: ABNORMAL
GRAM STAIN RESULT: ABNORMAL
HCO3 SERPL-SCNC: 24 MMOL/L (ref 22–29)
HCT VFR BLD AUTO: 31.7 % (ref 40–53)
HGB BLD-MCNC: 10.3 G/DL (ref 13.3–17.7)
IMM GRANULOCYTES # BLD: 0.1 10E3/UL
IMM GRANULOCYTES NFR BLD: 1 %
LYMPHOCYTES # BLD AUTO: 1 10E3/UL (ref 0.8–5.3)
LYMPHOCYTES NFR BLD AUTO: 10 %
MCH RBC QN AUTO: 27.8 PG (ref 26.5–33)
MCHC RBC AUTO-ENTMCNC: 32.5 G/DL (ref 31.5–36.5)
MCV RBC AUTO: 85 FL (ref 78–100)
MONOCYTES # BLD AUTO: 0.8 10E3/UL (ref 0–1.3)
MONOCYTES NFR BLD AUTO: 8 %
NEUTROPHILS # BLD AUTO: 8.7 10E3/UL (ref 1.6–8.3)
NEUTROPHILS NFR BLD AUTO: 82 %
NRBC # BLD AUTO: 0 10E3/UL
NRBC BLD AUTO-RTO: 0 /100
PLATELET # BLD AUTO: 237 10E3/UL (ref 150–450)
POTASSIUM SERPL-SCNC: 3.4 MMOL/L (ref 3.4–5.3)
RBC # BLD AUTO: 3.71 10E6/UL (ref 4.4–5.9)
SODIUM SERPL-SCNC: 137 MMOL/L (ref 135–145)
WBC # BLD AUTO: 10.6 10E3/UL (ref 4–11)

## 2025-02-20 PROCEDURE — 250N000013 HC RX MED GY IP 250 OP 250 PS 637: Performed by: INTERNAL MEDICINE

## 2025-02-20 PROCEDURE — 250N000013 HC RX MED GY IP 250 OP 250 PS 637: Performed by: PHYSICIAN ASSISTANT

## 2025-02-20 PROCEDURE — 80048 BASIC METABOLIC PNL TOTAL CA: CPT | Performed by: HOSPITALIST

## 2025-02-20 PROCEDURE — 250N000011 HC RX IP 250 OP 636: Performed by: HOSPITALIST

## 2025-02-20 PROCEDURE — 99232 SBSQ HOSP IP/OBS MODERATE 35: CPT | Performed by: HOSPITALIST

## 2025-02-20 PROCEDURE — 250N000013 HC RX MED GY IP 250 OP 250 PS 637: Performed by: HOSPITALIST

## 2025-02-20 PROCEDURE — 36415 COLL VENOUS BLD VENIPUNCTURE: CPT | Performed by: HOSPITALIST

## 2025-02-20 PROCEDURE — 99232 SBSQ HOSP IP/OBS MODERATE 35: CPT | Performed by: PHYSICIAN ASSISTANT

## 2025-02-20 PROCEDURE — 85025 COMPLETE CBC W/AUTO DIFF WBC: CPT | Performed by: HOSPITALIST

## 2025-02-20 PROCEDURE — 120N000001 HC R&B MED SURG/OB

## 2025-02-20 PROCEDURE — 250N000011 HC RX IP 250 OP 636: Performed by: PHYSICIAN ASSISTANT

## 2025-02-20 RX ORDER — CETIRIZINE HYDROCHLORIDE 10 MG/1
10 TABLET ORAL DAILY PRN
Status: DISCONTINUED | OUTPATIENT
Start: 2025-02-20 | End: 2025-02-24 | Stop reason: HOSPADM

## 2025-02-20 RX ORDER — PANTOPRAZOLE SODIUM 40 MG/1
40 TABLET, DELAYED RELEASE ORAL
Status: DISCONTINUED | OUTPATIENT
Start: 2025-02-20 | End: 2025-02-24 | Stop reason: HOSPADM

## 2025-02-20 RX ADMIN — LEVOTHYROXINE SODIUM 50 MCG: 0.05 TABLET ORAL at 09:06

## 2025-02-20 RX ADMIN — ENOXAPARIN SODIUM 40 MG: 40 INJECTION SUBCUTANEOUS at 13:45

## 2025-02-20 RX ADMIN — CETIRIZINE HYDROCHLORIDE 10 MG: 10 TABLET, FILM COATED ORAL at 17:03

## 2025-02-20 RX ADMIN — PIPERACILLIN AND TAZOBACTAM 3.38 G: 3; .375 INJECTION, POWDER, FOR SOLUTION INTRAVENOUS at 05:39

## 2025-02-20 RX ADMIN — ACETAMINOPHEN 650 MG: 325 TABLET, FILM COATED ORAL at 18:43

## 2025-02-20 RX ADMIN — ACETAMINOPHEN 650 MG: 325 TABLET, FILM COATED ORAL at 01:08

## 2025-02-20 RX ADMIN — PIPERACILLIN AND TAZOBACTAM 3.38 G: 3; .375 INJECTION, POWDER, FOR SOLUTION INTRAVENOUS at 13:45

## 2025-02-20 RX ADMIN — PIPERACILLIN AND TAZOBACTAM 3.38 G: 3; .375 INJECTION, POWDER, FOR SOLUTION INTRAVENOUS at 18:08

## 2025-02-20 RX ADMIN — PANTOPRAZOLE SODIUM 40 MG: 40 TABLET, DELAYED RELEASE ORAL at 09:06

## 2025-02-20 RX ADMIN — ASPIRIN 81 MG: 81 TABLET, COATED ORAL at 09:06

## 2025-02-20 ASSESSMENT — ACTIVITIES OF DAILY LIVING (ADL)
ADLS_ACUITY_SCORE: 29

## 2025-02-20 NOTE — PROGRESS NOTES
GASTROENTEROLOGY CHART CHECK     63 yo male with past medical history of HTN, HLD, STEMI, CAD s/p PCI x 2, hypothyroidism who presented with sepsis of unknown etiology and acute renal failure secondary to dehydration. Found to have contained duodenal perforation with hepatic abscess and severe sepsis. EGD deferred due to perforation. H pylori stool test negative. Patient underwent IR drainage of abscess yesterday.     - Recommend EGD in 1 month. [facility] will contact patient to arrange.   - GI will sign off. Please contact us with questions or concerns.     Amna Robles PA-C  [facility] ([facility])

## 2025-02-20 NOTE — PROGRESS NOTES
Infectious disease brief note  Chart checked fever noted  Cultures reviewed  Continue on Zosyn follow-up on the culture from the abscess    Dank Camarena MD  Infectious Disease

## 2025-02-20 NOTE — PROGRESS NOTES
[facility]    Medicine Progress Note - Hospitalist Service    Date of Admission:  2/16/2025    Assessment & Plan      Victoriano Bourgeois is a 64 year old male admitted on 2/16/2025. He has PMHx most notable for HTN, HLD, STEMI, CAD s/p PCI x 2, and hypothyroidism that presents with reported fever, chills and has findings concerning for sepsis of unknown source and acute renal failure secondary to dehydration.    MRCP/CT show: duodenitis vs ulcer with some air seen on CT   Repeated CT with contrast: likely contained duodenal perforation with hepatic abscess  Bacteremia  S/p IR drain placement in hepatic abscess  GI can sign off  ID/Surgery following    Severe Sepsis with Associated Organ Dysfunction of Acute renal failure and Lactic acidosis   Likely intra-abdominal source  Possible perforated ulcer/duodenitis    - patient presented with fever (to 104) and chills, fatigue and a few episodes of diarrhea    - decreased appetite x months    - UA-culture/CXR unrevealing    - blood cultures negative (now positive)    - still having fevers: last 101.9 at midnight    - CT abdo/pelvis: ill-defined soft tissue with inflammatory changes and air noted adjacent to pancreatic head and second portion of duodenum which could be related duodenitis, pancreatitis or malignancy    - MRCP: duodenitis vs ulcer    - received ceftriaxone x 2 days, now changed to zosyn on 2/18  to cover intra-abdominal infection    - interestingly patient denies abdominal pain and does not have tenderness on exam    - CT with contrast 2/18 showed likely contained duodenal perforation with hepatic abscess    - GI/Surgery involved    - IR for drain (placed on 2/19)    - started IV PPI BID, change to oral PPI    - ID consulted to guide antibiotic duration    - Blood cultures + for strep intermedius (from 2/16), repeated on 2/19     Anion gap metabolic acidosis    - resolved    Acute renal failure  Chronic kidney disease II    - likely due to  "dehydration and poor p.o. intake    - resolved    - cont to hold losartan as BPs are low normal    HTN  HLD  CAD s/p PCI x 2 in 2019  History of STEMI in 2019    - presented to [facility] in 2019 with chest pain and was found to have inferior STEMI and had stents placed in the mid circumflex and obtuse marginal artery    - patient is intolerant of statins    - on alirocumab every 2 weeks, resume on discharge    - PTA Diltiazem held on 2/19 due to low/normal BPs, losartan held since admission    - resume ASA (was held for procedure)    Hypothyroidism    - continue PTA Synthroid    Hyponatremia    - mild, 133    - resolved    Will update wife when she arrives (I have been meeting with her in the afternoon)      Diet: Advance Diet as Tolerated: Regular Diet Adult    DVT Prophylaxis: Pneumatic Compression Devices  Myrick Catheter: Not present  Lines: None     Cardiac Monitoring: None  Code Status: Full Code      Clinically Significant Risk Factors         # Hyponatremia: Lowest Na = 133 mmol/L in last 2 days, will monitor as appropriate  # Hypochloremia: Lowest Cl = 97 mmol/L in last 2 days, will monitor as appropriate      # Hypoalbuminemia: Lowest albumin = 3 g/dL at 2/17/2025  5:39 AM, will monitor as appropriate  # Coagulation Defect: INR = 1.36 (Ref range: 0.85 - 1.15) and/or PTT = N/A, will monitor for bleeding  # Drug Induced Platelet Defect: home medication list includes an antiplatelet medication   # Hypertension: Home medication list includes antihypertensive(s)            # Overweight: Estimated body mass index is 29.54 kg/m  as calculated from the following:    Height as of this encounter: 1.803 m (5' 11\").    Weight as of this encounter: 96.1 kg (211 lb 12.8 oz)., PRESENT ON ADMISSION            Social Drivers of Health    Tobacco Use: Medium Risk (2/16/2025)    Received from [employer]    Patient History     Smoking Tobacco Use: Former     Smokeless Tobacco Use: Never    Received from [employer] [facility], [facility]    Social Connections          Disposition Plan     Medically Ready for Discharge: Anticipated in 2-4 Days    The patient's care was discussed with the Bedside Nurse, Patient, and Patient's Family.    Lino Mullins MD  Hospitalist Service  [facility]  Securely message with [employer] (more info)  Text page via [employer] Paging/Directory   ______________________________________________________________________    Interval History   Patient is in bed. Doing well. Slept well.   Eating and drinking better  +BM  No chest pain or SOB  Has some discomfort at the NADIA drain site  Physical Exam   Vital Signs: Temp: 98.8  F (37.1  C) Temp src: Oral BP: 113/70 Pulse: 69   Resp: 20 SpO2: 95 % O2 Device: None (Room air) Oxygen Delivery: 2 LPM  Weight: 211 lbs 12.8 oz    GENERAL:  Comfortable.  PSYCH: pleasant, oriented, No acute distress.  HEART:  Normal S1, S2 with no murmur, no pericardial rub, gallops or S3 or S4.  LUNGS:  Clear to auscultation, normal Respiratory effort. No wheezing, rales or ronchi.  GI:  Soft, normal bowel sounds. +NADIA drain with clearish minimal drainage  EXTREMITIES:  No pedal edema, +2 pulses bilateral and equal.  SKIN:  Dry to touch, No rash, wound or ulcerations.    Medical Decision Making       65 MINUTES SPENT BY ME on the date of service doing chart review, history, exam, documentation & further activities per the note.      Data         Imaging results reviewed over the past 24 hrs:   Recent Results (from the past 24 hours)    US Peritoneal/Retroperitoneal Drain Placement Non-Tunneled    Narrative    EXAM:  1. PERCUTANEOUS DRAIN PLACEMENT LIVER  2. ULTRASOUND GUIDANCE  3. CONSCIOUS SEDATION  LOCATION: [facility]  DATE: 2/19/2025    INDICATION: Liver abscess ( lesion in the caudate lobe of the liver), h o COVID, possible bowel perforation, intermittent rigors    PROCEDURE: Informed  consent obtained. Site marked. Prior images reviewed. Required items made available. Patient  identity confirmed verbally and with arm band. Patient reevaluated immediately before administering sedation. Universal protocol was   followed. Time out performed. The site was prepped and draped in sterile fashion. 10 mL of 1% lidocaine was infused into the local soft tissues. Using standard technique and under direct ultrasound guidance, a 10 Montserratian drainage catheter was inserted   into the fluid collection.      SPECIMEN: 15 mL of purulent fluid was aspirated and sent to lab for cultures and Gram stain.    BLOOD LOSS: Minimal.    The patient tolerated the procedure well. No immediate complications.    SEDATION: Versed 1 mg. Fentanyl 50 mcg. The procedure was performed with administration intravenous conscious sedation with appropriate preoperative, intraoperative, and postoperative evaluation.    30 minutes of supervised face to face conscious sedation time was provided by a radiology nurse under my direct supervision.      Impression    IMPRESSION:  1.  Ultrasound-guided drain placement into liver. Access was difficult given location in the caudate lobe. A direct intrahepatic approach was considered, though this would require intercostal and relatively cranial approach in navigating beyond hepatic   system, portal venous system, and bile ducts. There would be risk for pneumothorax/complicated pleural effusion. Alternatively, a subxiphoid approach could be attempted, this would require three capsular punctures, two through the left hepatic lobe and a   third into the caudate lobe. After a lengthy consideration, decision was made for a left transhepatic of pleural, subxiphoid. Ultimate placement of catheter is successful by completion. Sample sent to the lab for evaluation. Hepatic abscess drains may   be required for longer periods of time. The tube will require flushing of the catheter 3 times daily with 10 mL of  normal saline.

## 2025-02-20 NOTE — PLAN OF CARE
"[id number]    Inpatient Progress Note:    /70 (BP Location: Right arm)   Pulse 69   Temp 98.8  F (37.1  C) (Oral)   Resp 20   Ht 1.803 m (5' 11\")   Wt 96.1 kg (211 lb 12.8 oz)   SpO2 95%   BMI 29.54 kg/m       Goal Outcome Evaluation:      Plan of Care Reviewed With: patient    Overall Patient Progress: improvingOverall Patient Progress: improving    Outcome Evaluation: Patient is pleasent no concern of confusion , elevated temp. Prn Tylenol is given, active BS present x4, NADIA flashed and drained x2 light pink in color,30 ml output. Scheduled  IV protonix and Q6hrs, Zosyn given flashed IV saline locked. Patient denies pain. Sleep/rest promoted.    Problem: Adult Inpatient Plan of Care  Goal: Plan of Care Review  Description:   Outcome: Progressing  Flowsheets (Taken 2/19/2025 2113)  Outcome Evaluation: Patient is pleasent no concern of confusion , VSS,RA, active BS present x4, NADIA drain flashed and drained light pink drainage 10 ml, IV protonix given flashed IV saline locked.  Patient denes pain. Sleep/rest promoted.  Plan of Care Reviewed With: patient  Overall Patient Progress: improving  Goal: Patient-Specific Goal (Individualized)  Description: You can add care plan individualizations to a care plan. Examples of Individualization might be:  \"Parent requests to be called daily at 9am for status\", \"I have a hard time hearing out of my right ear\", or \"Do not touch me to wake me up as it startles  me\".  Outcome: Progressing  Goal: Absence of Hospital-Acquired Illness or Injury  Outcome: Progressing  Intervention: Identify and Manage Fall Risk  Recent Flowsheet Documentation  Taken 2/19/2025 1955 by Deb Angulo, RN  Safety Promotion/Fall Prevention: room near nurse's station  Intervention: Prevent Infection  Recent Flowsheet Documentation  Taken 2/19/2025 1955 by Deb Angulo, RN  Infection Prevention: single patient room provided  Goal: Optimal Comfort and Wellbeing  Outcome: Progressing  Goal: " Readiness for Transition of Care  Outcome: Progressing

## 2025-02-20 NOTE — PLAN OF CARE
"Goal Outcome Evaluation:      Plan of Care Reviewed With: patient, spouse    Overall Patient Progress: improvingOverall Patient Progress: improving    Outcome Evaluation: AOx4. Up ad olivier and ambulating halls throughout shift. S.O. at bedside. Pain in abd at  site- 2/10, ice applied. NADIA to bulb suction- irrigating abscess per orders-see MAR. 10cc bright red bloody output this shift. Afebrile. Repeat BC from 2/19/25- NGTD. Continue IV zosyn. ID, Gen surg following. GI signed off.      Problem: Adult Inpatient Plan of Care  Goal: Plan of Care Review  Description: The Plan of Care Review/Shift note should be completed every shift.  The Outcome Evaluation is a brief statement about your assessment that the patient is improving, declining, or no change.  This information will be displayed automatically on your shift  note.  Outcome: Progressing  Flowsheets (Taken 2/20/2025 1505)  Outcome Evaluation: AOx4. Up ad olivier and ambulating halls throughout shift. S.O. at bedside. Pain in abd at  site- 2/10, ice applied. NADIA to bulb suction- irrigating abscess per orders-see MAR. 10cc bright red bloody output this shift. Afebrile. Repeat BC from 2/19/25- NGTD. Continue IV zosyn. ID, Gen surg following. GI signed off.  Plan of Care Reviewed With:   patient   spouse  Overall Patient Progress: improving  Goal: Patient-Specific Goal (Individualized)  Description: You can add care plan individualizations to a care plan. Examples of Individualization might be:  \"Parent requests to be called daily at 9am for status\", \"I have a hard time hearing out of my right ear\", or \"Do not touch me to wake me up as it startles  me\".  Outcome: Progressing  Goal: Absence of Hospital-Acquired Illness or Injury  Outcome: Progressing  Intervention: Identify and Manage Fall Risk  Recent Flowsheet Documentation  Taken 2/20/2025 1345 by Kristyn Seymour RN  Safety Promotion/Fall Prevention:   clutter free environment maintained   nonskid shoes/slippers when " out of bed   safety round/check completed  Intervention: Prevent Skin Injury  Recent Flowsheet Documentation  Taken 2/20/2025 0815 by Kristyn Seymour RN  Body Position: position changed independently  Goal: Optimal Comfort and Wellbeing  Outcome: Progressing  Intervention: Monitor Pain and Promote Comfort  Recent Flowsheet Documentation  Taken 2/20/2025 0815 by Kristyn Seymour RN  Pain Management Interventions: cold applied  Goal: Readiness for Transition of Care  Outcome: Progressing

## 2025-02-20 NOTE — PLAN OF CARE
"A&Ox4. No c/o pain or discomfort. Afebrile. NADIA drain intact, to bulb suction, flushed and draining bright red bloody drainage. BC positive for strep anginosis, hospitalist aware. Will continue with plan of care.     Problem: Adult Inpatient Plan of Care  Goal: Plan of Care Review  Description: The Plan of Care Review/Shift note should be completed every shift.  The Outcome Evaluation is a brief statement about your assessment that the patient is improving, declining, or no change.  This information will be displayed automatically on your shift  note.  Outcome: Progressing  Flowsheets (Taken 2/19/2025 1851)  Outcome Evaluation: A&Ox4. No c/o pain or discomfort. Afebrile. NADIA drain intact, to bulb suction, flushed and draining bright red bloody drainage. BC positive for strep anginosis, hospitalist aware. Will continue with plan of care.  Plan of Care Reviewed With:   patient   spouse  Overall Patient Progress: improving  Goal: Patient-Specific Goal (Individualized)  Description: You can add care plan individualizations to a care plan. Examples of Individualization might be:  \"Parent requests to be called daily at 9am for status\", \"I have a hard time hearing out of my right ear\", or \"Do not touch me to wake me up as it startles  me\".  Outcome: Progressing  Goal: Absence of Hospital-Acquired Illness or Injury  Outcome: Progressing  Intervention: Identify and Manage Fall Risk  Recent Flowsheet Documentation  Taken 2/19/2025 1614 by Sheryl Lorenzana RN  Safety Promotion/Fall Prevention: room near nurse's station  Intervention: Prevent Infection  Recent Flowsheet Documentation  Taken 2/19/2025 1614 by Sheryl Lorenzana RN  Infection Prevention: single patient room provided  Goal: Optimal Comfort and Wellbeing  Outcome: Progressing  Goal: Readiness for Transition of Care  Outcome: Progressing     Problem: Infection  Goal: Absence of Infection Signs and Symptoms  Outcome: Progressing     Problem: Fatigue  Goal: Improved " Activity Tolerance  Outcome: Progressing     Problem: Comorbidity Management  Goal: Blood Pressure in Desired Range  Outcome: Progressing  Intervention: Maintain Blood Pressure Management  Recent Flowsheet Documentation  Taken 2/19/2025 1614 by Sheryl Lorenzana, RN  Medication Review/Management: medications reviewed

## 2025-02-20 NOTE — PROGRESS NOTES
"[facility]   General Surgery Progress Note           Assessment and Plan:   Assessment:   Sepsis, suspected contained duodenal perforation with resulting hepatic abscess  2/18 H. Pylori negative   2/19 IR drainage of hepatic abscess, cultures pending      Plan:   -regular diet  -abx per ID: IV Zosyn  -PPI  -await culture results  -continue non-operative management         Interval History:   Tmax 101.9 overnight. Afebrile so far today. Poor sleep due to discomfort at IR drain site. Tolerating diet and having bowel function. No new concerns.          Physical Exam:   Blood pressure 129/73, pulse 65, temperature 98.5  F (36.9  C), temperature source Oral, resp. rate 18, height 1.803 m (5' 11\"), weight 96.1 kg (211 lb 12.8 oz), SpO2 95%.    I/O last 3 completed shifts:  In: 10 [Other:10]  Out: 80 [Drains:60; Other:20]    Abdomen:   soft, non-distended, non-tender            Data:     Recent Labs   Lab 02/20/25  0839 02/19/25  0623 02/18/25  0624   WBC 10.6 12.5* 9.3   HGB 10.3* 10.6* 11.2*   HCT 31.7* 31.6* 33.2*   MCV 85 85 84    198 217       Asha Ford PA-C  Text page: [phone] 8 am to 4 pm  After 4 pm, call [phone]       "

## 2025-02-20 NOTE — PLAN OF CARE
"Goal Outcome Evaluation:      Plan of Care Reviewed With: patient    Overall Patient Progress: improvingOverall Patient Progress: improving    Outcome Evaluation: Patient is pleasent no concern of confusion , VSS,RA, active BS present x4, NADIA drain flashed and drained light pink drainage 10 ml, ABX Zosyn q6hrs.and IV protonix given flashed IV saline locked.  Patient denies pain. Sleep/rest promoted.      Problem: Adult Inpatient Plan of Care  Goal: Plan of Care Review  Description: The Plan of Care Review/Shift note should be completed every shift.  The Outcome Evaluation is a brief statement about your assessment that the patient is improving, declining, or no change.  This information will be displayed automatically on your shift  note.  2/20/2025 0630 by Deb Angulo RN  Outcome: Progressing  2/19/2025 2113 by Deb Angulo RN  Outcome: Progressing  Flowsheets (Taken 2/19/2025 2113)  Outcome Evaluation: Patient is pleasent no concern of confusion , VSS,RA, active BS present x4, NADIA drain flashed and drained light pink drainage 10 ml, IV protonix given flashed IV saline locked.  Patient denes pain. Sleep/rest promoted.  Plan of Care Reviewed With: patient  Overall Patient Progress: improving  Goal: Patient-Specific Goal (Individualized)  Description: You can add care plan individualizations to a care plan. Examples of Individualization might be:  \"Parent requests to be called daily at 9am for status\", \"I have a hard time hearing out of my right ear\", or \"Do not touch me to wake me up as it startles  me\".  2/20/2025 0630 by Deb Angulo RN  Outcome: Progressing  2/19/2025 2113 by Deb Angulo RN  Outcome: Progressing  Goal: Absence of Hospital-Acquired Illness or Injury  2/20/2025 0630 by Deb Angulo RN  Outcome: Progressing  2/19/2025 2113 by Deb Angulo RN  Outcome: Progressing  Intervention: Identify and Manage Fall Risk  Recent Flowsheet Documentation  Taken 2/20/2025 0059 by Deb Angulo, " RN  Safety Promotion/Fall Prevention: room near nurse's station  Taken 2/19/2025 1955 by Deb Angulo RN  Safety Promotion/Fall Prevention: room near nurse's station  Intervention: Prevent Skin Injury  Recent Flowsheet Documentation  Taken 2/20/2025 0059 by Deb Angulo RN  Body Position: position changed independently  Intervention: Prevent Infection  Recent Flowsheet Documentation  Taken 2/20/2025 0059 by Deb Angulo RN  Infection Prevention: single patient room provided  Taken 2/19/2025 1955 by Deb Angulo RN  Infection Prevention: single patient room provided  Goal: Optimal Comfort and Wellbeing  2/20/2025 0630 by Deb Angulo RN  Outcome: Progressing  2/19/2025 2113 by Deb Angulo RN  Outcome: Progressing  Goal: Readiness for Transition of Care  2/20/2025 0630 by Deb Angulo RN  Outcome: Progressing  2/19/2025 2113 by Deb Angulo RN  Outcome: Progressing

## 2025-02-21 LAB
ANION GAP SERPL CALCULATED.3IONS-SCNC: 11 MMOL/L (ref 7–15)
BACTERIA BLD CULT: NO GROWTH
BASOPHILS # BLD AUTO: 0 10E3/UL (ref 0–0.2)
BASOPHILS NFR BLD AUTO: 0 %
BUN SERPL-MCNC: 12.4 MG/DL (ref 8–23)
CALCIUM SERPL-MCNC: 8.2 MG/DL (ref 8.8–10.4)
CHLORIDE SERPL-SCNC: 106 MMOL/L (ref 98–107)
CREAT SERPL-MCNC: 1.09 MG/DL (ref 0.67–1.17)
EGFRCR SERPLBLD CKD-EPI 2021: 75 ML/MIN/1.73M2
EOSINOPHIL # BLD AUTO: 0 10E3/UL (ref 0–0.7)
EOSINOPHIL NFR BLD AUTO: 0 %
ERYTHROCYTE [DISTWIDTH] IN BLOOD BY AUTOMATED COUNT: 13.7 % (ref 10–15)
GLUCOSE SERPL-MCNC: 106 MG/DL (ref 70–99)
HCO3 SERPL-SCNC: 24 MMOL/L (ref 22–29)
HCT VFR BLD AUTO: 29.6 % (ref 40–53)
HGB BLD-MCNC: 9.7 G/DL (ref 13.3–17.7)
IMM GRANULOCYTES # BLD: 0.1 10E3/UL
IMM GRANULOCYTES NFR BLD: 1 %
LYMPHOCYTES # BLD AUTO: 0.8 10E3/UL (ref 0.8–5.3)
LYMPHOCYTES NFR BLD AUTO: 8 %
MCH RBC QN AUTO: 27.7 PG (ref 26.5–33)
MCHC RBC AUTO-ENTMCNC: 32.8 G/DL (ref 31.5–36.5)
MCV RBC AUTO: 85 FL (ref 78–100)
MONOCYTES # BLD AUTO: 0.8 10E3/UL (ref 0–1.3)
MONOCYTES NFR BLD AUTO: 8 %
NEUTROPHILS # BLD AUTO: 8.1 10E3/UL (ref 1.6–8.3)
NEUTROPHILS NFR BLD AUTO: 83 %
NRBC # BLD AUTO: 0 10E3/UL
NRBC BLD AUTO-RTO: 0 /100
PLATELET # BLD AUTO: 239 10E3/UL (ref 150–450)
POTASSIUM SERPL-SCNC: 3.6 MMOL/L (ref 3.4–5.3)
RBC # BLD AUTO: 3.5 10E6/UL (ref 4.4–5.9)
SODIUM SERPL-SCNC: 141 MMOL/L (ref 135–145)
WBC # BLD AUTO: 9.8 10E3/UL (ref 4–11)

## 2025-02-21 PROCEDURE — 120N000001 HC R&B MED SURG/OB

## 2025-02-21 PROCEDURE — 250N000013 HC RX MED GY IP 250 OP 250 PS 637: Performed by: INTERNAL MEDICINE

## 2025-02-21 PROCEDURE — 36415 COLL VENOUS BLD VENIPUNCTURE: CPT | Performed by: HOSPITALIST

## 2025-02-21 PROCEDURE — 82310 ASSAY OF CALCIUM: CPT | Performed by: HOSPITALIST

## 2025-02-21 PROCEDURE — 99232 SBSQ HOSP IP/OBS MODERATE 35: CPT | Performed by: INTERNAL MEDICINE

## 2025-02-21 PROCEDURE — 250N000011 HC RX IP 250 OP 636: Performed by: HOSPITALIST

## 2025-02-21 PROCEDURE — 99232 SBSQ HOSP IP/OBS MODERATE 35: CPT | Performed by: HOSPITALIST

## 2025-02-21 PROCEDURE — 250N000013 HC RX MED GY IP 250 OP 250 PS 637: Performed by: HOSPITALIST

## 2025-02-21 PROCEDURE — 85025 COMPLETE CBC W/AUTO DIFF WBC: CPT | Performed by: HOSPITALIST

## 2025-02-21 PROCEDURE — 80048 BASIC METABOLIC PNL TOTAL CA: CPT | Performed by: HOSPITALIST

## 2025-02-21 PROCEDURE — 85018 HEMOGLOBIN: CPT | Performed by: HOSPITALIST

## 2025-02-21 RX ORDER — DILTIAZEM HYDROCHLORIDE 180 MG/1
180 CAPSULE, COATED, EXTENDED RELEASE ORAL DAILY
Status: DISCONTINUED | OUTPATIENT
Start: 2025-02-22 | End: 2025-02-24 | Stop reason: HOSPADM

## 2025-02-21 RX ADMIN — PIPERACILLIN AND TAZOBACTAM 3.38 G: 3; .375 INJECTION, POWDER, FOR SOLUTION INTRAVENOUS at 18:01

## 2025-02-21 RX ADMIN — ASPIRIN 81 MG: 81 TABLET, COATED ORAL at 08:05

## 2025-02-21 RX ADMIN — ACETAMINOPHEN 650 MG: 325 TABLET, FILM COATED ORAL at 04:35

## 2025-02-21 RX ADMIN — LEVOTHYROXINE SODIUM 50 MCG: 0.05 TABLET ORAL at 08:05

## 2025-02-21 RX ADMIN — PIPERACILLIN AND TAZOBACTAM 3.38 G: 3; .375 INJECTION, POWDER, FOR SOLUTION INTRAVENOUS at 14:03

## 2025-02-21 RX ADMIN — PIPERACILLIN AND TAZOBACTAM 3.38 G: 3; .375 INJECTION, POWDER, FOR SOLUTION INTRAVENOUS at 00:09

## 2025-02-21 RX ADMIN — PANTOPRAZOLE SODIUM 40 MG: 40 TABLET, DELAYED RELEASE ORAL at 06:53

## 2025-02-21 RX ADMIN — PIPERACILLIN AND TAZOBACTAM 3.38 G: 3; .375 INJECTION, POWDER, FOR SOLUTION INTRAVENOUS at 06:11

## 2025-02-21 ASSESSMENT — ACTIVITIES OF DAILY LIVING (ADL)
ADLS_ACUITY_SCORE: 33
ADLS_ACUITY_SCORE: 33
ADLS_ACUITY_SCORE: 29
ADLS_ACUITY_SCORE: 33
ADLS_ACUITY_SCORE: 29
ADLS_ACUITY_SCORE: 33
ADLS_ACUITY_SCORE: 29
ADLS_ACUITY_SCORE: 29
ADLS_ACUITY_SCORE: 33
ADLS_ACUITY_SCORE: 29
ADLS_ACUITY_SCORE: 29
ADLS_ACUITY_SCORE: 33
ADLS_ACUITY_SCORE: 29
ADLS_ACUITY_SCORE: 33
ADLS_ACUITY_SCORE: 29

## 2025-02-21 NOTE — PROGRESS NOTES
[facility]    Infectious Disease Progress Note    Date of Service (when I saw the patient): 02/21/2025     Assessment & Plan   Victoriano Bourgeois is a 64 year old male who was admitted on 2/16/2025.     Impression:  53 yo with PMH of HTN, HLD, STEMI, CAD s/p PCI x 2, and hypothyroidism   Presented with reported fever, chills   MRCP/CT show: duodenitis vs ulcer with some air seen on CT   Repeated CT with contrast: likely contained duodenal perforation with hepatic abscess  S/p drain placement   On zosyn   Blood culture with strep intermedius   Abscesses cultures with GOC pending ID   Continues to be febrile   Surgery has signed off      Recommendations   For now zosyn   Follow up on the abscesses cultures   Fever again on zosyn Repeat CT scan tomorrow to see any residual abscesses the cause of the fevers   Noted gen surg note about need for hepatobiliary surgeon for any surgical intervention in the duodenal perforation, ? Need for transfer to [facility]   Evaluate further based on the CT scan       Dank Camarena MD    Interval History   Tolerating antibiotics ok   No new rashes or issues with antibiotics   Labs reviewed   No changes to past medical, social or family history   Feels better but still had a fever this morning       Physical Exam   Temp: 98.9  F (37.2  C) Temp src: Oral BP: 125/84 Pulse: 73   Resp: 16 SpO2: 97 % O2 Device: None (Room air)    Vitals:    02/16/25 1659 02/17/25 0013   Weight: 94.8 kg (208 lb 15.9 oz) 96.1 kg (211 lb 12.8 oz)     Vital Signs with Ranges  Temp:  [98.9  F (37.2  C)-101.5  F (38.6  C)] 98.9  F (37.2  C)  Pulse:  [73-78] 73  Resp:  [14-18] 16  BP: (113-141)/(72-84) 125/84  SpO2:  [93 %-97 %] 97 %    Constitutional: Awake, alert, cooperative, no apparent distress  Lungs: Clear to auscultation bilaterally, no crackles or wheezing  Cardiovascular: Regular rate and rhythm, normal S1 and S2, and no murmur noted  Abdomen: Normal bowel sounds, soft, non-distended,  "non-tender  Skin: No rashes, no cyanosis, no edema  Other:    Medications   Current Facility-Administered Medications   Medication Dose Route Frequency Provider Last Rate Last Admin     Current Facility-Administered Medications   Medication Dose Route Frequency Provider Last Rate Last Admin    aspirin EC tablet 81 mg  81 mg Oral Daily Lino Mullins MD   81 mg at 02/21/25 0805    [START ON 2/22/2025] diltiazem ER COATED BEADS (CARDIZEM CD/CARTIA XT) 24 hr capsule 180 mg  180 mg Oral Daily Lino Mullins MD        enoxaparin ANTICOAGULANT (LOVENOX) injection 40 mg  40 mg Subcutaneous Q24H Zenaida Napoles PA-C   40 mg at 02/20/25 1345    levothyroxine (SYNTHROID/LEVOTHROID) tablet 50 mcg  50 mcg Oral Daily Elías Chavarria MD   50 mcg at 02/21/25 0805    [Held by provider] losartan (COZAAR) tablet 100 mg  100 mg Oral Daily Zenaida Napoles PA-C        pantoprazole (PROTONIX) EC tablet 40 mg  40 mg Oral QAM AC Lino Mullins MD   40 mg at 02/21/25 0653    piperacillin-tazobactam (ZOSYN) 3.375 g vial to attach to  mL bag  3.375 g Intravenous Q6H Lino Mullins MD   3.375 g at 02/21/25 0611    sodium chloride (PF) 0.9% PF flush 10 mL  10 mL Irrigation Q8H [facility] APR[facility] CNP   10 mL at 02/21/25 0805    sodium chloride (PF) 0.9% PF flush 10 mL  10 mL Irrigation Q8H Winston Goldberg MD   10 mL at 02/21/25 0805    sodium chloride (PF) 0.9% PF flush 3 mL  3 mL Intracatheter Q8H Elías Chavarria MD   3 mL at 02/21/25 0806       Data   All microbiology laboratory data reviewed.  Recent Labs   Lab Test 02/21/25  0636 02/20/25  0839 02/19/25  0623   WBC 9.8 10.6 12.5*   HGB 9.7* 10.3* 10.6*   HCT 29.6* 31.7* 31.6*   MCV 85 85 85    237 198     Recent Labs   Lab Test 02/21/25  0636 02/20/25  0839 02/19/25  0623   CR 1.09 1.02 1.16     Recent Labs   Lab Test 08/28/19  1333   SED 10     No lab results found.    Invalid input(s): \"UC\"    All cultures:  Recent Labs "   Lab 02/19/25  1321 02/19/25  1222 02/16/25 2001 02/16/25  1900 02/16/25  1716   CULTURE No growth after 1 day  No growth after 1 day No anaerobic organisms isolated after 1 day  No growth, less than 1 day <10,000 CFU/mL Mixture of Urogenital Oksana Positive on the 2nd day of incubation*  Streptococcus intermedius* No growth after 4 days      Blood culture:  Results for orders placed or performed during the hospital encounter of 02/16/25   Blood Culture Arm, Right    Collection Time: 02/19/25  1:21 PM    Specimen: Arm, Right; Blood   Result Value Ref Range    Culture No growth after 1 day    Blood Culture Arm, Right    Collection Time: 02/19/25  1:21 PM    Specimen: Arm, Right; Blood   Result Value Ref Range    Culture No growth after 1 day    Blood Culture Arm, Right    Collection Time: 02/16/25  7:00 PM    Specimen: Arm, Right; Blood   Result Value Ref Range    Culture Positive on the 2nd day of incubation (A)     Culture Streptococcus intermedius (AA)    Blood Culture Peripheral Blood    Collection Time: 02/16/25  5:16 PM    Specimen: Peripheral Blood   Result Value Ref Range    Culture No growth after 4 days       Urine culture:  Results for orders placed or performed during the hospital encounter of 02/16/25   Urine Culture    Collection Time: 02/16/25  8:01 PM    Specimen: Urine, NOS   Result Value Ref Range    Culture <10,000 CFU/mL Mixture of Urogenital Oksana

## 2025-02-21 NOTE — PROGRESS NOTES
"[facility]  General Surgery  CHART CHECK:    SUBJECTIVE:  Tmax 101.5 this am. Feeling well without concerns per chart    OBJECTIVE:  /84 (BP Location: Right arm)   Pulse 73   Temp 98.9  F (37.2  C) (Oral)   Resp 16   Ht 1.803 m (5' 11\")   Wt 96.1 kg (211 lb 12.8 oz)   SpO2 97%   BMI 29.54 kg/m         ASSESSMENT/PLAN:  No surgery indicated. Any surgical intervention for his duodenal perforation should be performed by a hepatobiliary surgeon.  Abx plan per ID.  Drain management per IR.  General Surgery will sign off, please contact if questions.      Asha Ford PA-C            "

## 2025-02-21 NOTE — PLAN OF CARE
"Care from [id number]      Inpatient Progress Note:  For complete assessment see flow sheet documentation.       /73 (BP Location: Right arm)   Pulse 73   Temp 98.9  F (37.2  C) (Oral)   Resp 14   Ht 1.803 m (5' 11\")   Wt 96.1 kg (211 lb 12.8 oz)   SpO2 94%   BMI 29.54 kg/m         Neuro: A&O x4  Vital Signs: VSS on this shift.  Pain/Comfort: Denies pain.  Diet/po intake: Regular diet.  Output: Up to bathroom for use, NADIA drain in abdomen. Flushing Q8, charting drain in flow sheet.  Activity/Ambulation: IND   Pertinent assessments: Monitor temps  Infusions: Q6 zosyn.  Major Shift Events: N/A  Plan (Upcoming Events): Cont IV antibiotics. Follow cultures. ID and Gen surg are following.   Discharge/Transfer Needs: Will discharge home when medically cleared.     Will continue with POC.          Will continue to monitor and provide cares.    Goal Outcome Evaluation:      Plan of Care Reviewed With: patient    Overall Patient Progress: improvingOverall Patient Progress: improving    Outcome Evaluation: A&O x4. Ind in room. Monitor NADIA drain. Denies pain. IV zosyn Q 6. ID and Gen surg following.      Problem: Adult Inpatient Plan of Care  Goal: Plan of Care Review  Description: The Plan of Care Review/Shift note should be completed every shift.  The Outcome Evaluation is a brief statement about your assessment that the patient is improving, declining, or no change.  This information will be displayed automatically on your shift  note.  Outcome: Progressing  Flowsheets (Taken 2/21/2025 0142)  Outcome Evaluation: A&O x4. Ind in room. Monitor NADIA drain. Denies pain. IV zosyn Q 6. ID and Gen surg following.  Plan of Care Reviewed With: patient  Overall Patient Progress: improving  Goal: Patient-Specific Goal (Individualized)  Description: You can add care plan individualizations to a care plan. Examples of Individualization might be:  \"Parent requests to be called daily at 9am for status\", \"I have a hard time hearing " "out of my right ear\", or \"Do not touch me to wake me up as it startles  me\".  Outcome: Progressing  Goal: Absence of Hospital-Acquired Illness or Injury  Outcome: Progressing  Intervention: Identify and Manage Fall Risk  Recent Flowsheet Documentation  Taken 2/21/2025 0016 by Aura Curry RN  Safety Promotion/Fall Prevention:   clutter free environment maintained   nonskid shoes/slippers when out of bed   safety round/check completed  Intervention: Prevent Skin Injury  Recent Flowsheet Documentation  Taken 2/21/2025 0016 by Aura Curry RN  Body Position:   position changed independently   side-lying  Intervention: Prevent and Manage VTE (Venous Thromboembolism) Risk  Recent Flowsheet Documentation  Taken 2/21/2025 0016 by uAra Curry RN  VTE Prevention/Management: SCDs off (sequential compression devices)  Goal: Optimal Comfort and Wellbeing  Outcome: Progressing  Goal: Readiness for Transition of Care  Outcome: Progressing     Problem: Infection  Goal: Absence of Infection Signs and Symptoms  Outcome: Progressing     Problem: Fatigue  Goal: Improved Activity Tolerance  Outcome: Progressing  Intervention: Promote Improved Energy  Recent Flowsheet Documentation  Taken 2/21/2025 0016 by Aura Curry RN  Activity Management: up ad olivier     Problem: Comorbidity Management  Goal: Blood Pressure in Desired Range  Outcome: Progressing  Intervention: Maintain Blood Pressure Management  Recent Flowsheet Documentation  Taken 2/21/2025 0016 by Aura Curry RN  Medication Review/Management: medications reviewed  Goal: Maintenance of Asthma Control  Outcome: Progressing  Intervention: Maintain Asthma Symptom Control  Recent Flowsheet Documentation  Taken 2/21/2025 0016 by Aura Curry RN  Medication Review/Management: medications reviewed  Goal: Maintenance of Behavioral Health Symptom Control  Outcome: Progressing  Intervention: Maintain Behavioral Health Symptom Control  Recent Flowsheet " Documentation  Taken 2/21/2025 0016 by Aura Curry RN  Medication Review/Management: medications reviewed  Goal: Maintenance of COPD Symptom Control  Outcome: Progressing  Intervention: Maintain COPD Symptom Control  Recent Flowsheet Documentation  Taken 2/21/2025 0016 by Aura Curry RN  Medication Review/Management: medications reviewed  Goal: Blood Glucose Levels Within Targeted Range  Outcome: Progressing  Intervention: Monitor and Manage Glycemia  Recent Flowsheet Documentation  Taken 2/21/2025 0016 by Aura Curry RN  Medication Review/Management: medications reviewed  Goal: Maintenance of Heart Failure Symptom Control  Outcome: Progressing  Intervention: Maintain Heart Failure Management  Recent Flowsheet Documentation  Taken 2/21/2025 0016 by Aura Curry RN  Medication Review/Management: medications reviewed  Goal: Maintenance of Osteoarthritis Symptom Control  Outcome: Progressing  Intervention: Maintain Osteoarthritis Symptom Control  Recent Flowsheet Documentation  Taken 2/21/2025 0016 by Aura Curry RN  Activity Management: up ad olivier  Medication Review/Management: medications reviewed  Goal: Bariatric Home Regimen Maintained  Outcome: Progressing  Intervention: Maintain and Manage Postbariatric Surgery Care  Recent Flowsheet Documentation  Taken 2/21/2025 0016 by Aura Curry RN  Medication Review/Management: medications reviewed  Goal: Maintenance of Seizure Control  Outcome: Progressing  Intervention: Maintain Seizure Symptom Control  Recent Flowsheet Documentation  Taken 2/21/2025 0016 by Aura Curry RN  Medication Review/Management: medications reviewed

## 2025-02-21 NOTE — PROGRESS NOTES
[facility]    Medicine Progress Note - Hospitalist Service    Date of Admission:  2/16/2025    Assessment & Plan      Victoriano Bourgeois is a 64 year old male admitted on 2/16/2025. He has PMHx most notable for HTN, HLD, STEMI, CAD s/p PCI x 2, and hypothyroidism that presents with reported fever, chills and has findings concerning for sepsis of unknown source and acute renal failure secondary to dehydration.    MRCP/CT show: duodenitis vs ulcer with some air seen on CT   Repeated CT with contrast: likely contained duodenal perforation with hepatic abscess  Bacteremia  S/p IR drain placement in hepatic abscess (2/19)  GI can sign off  ID/Surgery following  Cultures still pending    Severe Sepsis with Associated Organ Dysfunction of Acute renal failure and Lactic acidosis   Likely intra-abdominal source  Possible perforated ulcer/duodenitis  Bacteremia    - patient presented with fever (to 104) and chills, fatigue and a few episodes of diarrhea    - decreased appetite x months    - UA-culture/CXR unrevealing    - CT abdo/pelvis: ill-defined soft tissue with inflammatory changes and air noted adjacent to pancreatic head and second portion of duodenum which could be related duodenitis, pancreatitis or malignancy    - MRCP: duodenitis vs ulcer    - received ceftriaxone x 2 days, now changed to zosyn on 2/18  to cover intra-abdominal infection    - interestingly patient denies abdominal pain and does not have tenderness on exam    - CT with contrast 2/18 showed likely contained duodenal perforation with hepatic abscess    - GI/Surgery involved    - IR for drain (placed on 2/19), cultures still pending    - started IV PPI BID, change to oral PPI (lifelong)    - ID consulted to guide antibiotic duration    - Blood cultures + for strep intermedius (from 2/16), repeated on 2/19 (neg so far)    - Tmax 101.5 at 4:30am today    Addendum: spoke with ID. Given continued fevers, recommending repeat CT in am  (ordered). If there are concerns with the CT, Surgery will need to be re-consulted as they signed off.    Duodenal ulcer with perforation    - as seen on CT    - GI signed off    - IV PPI now changed to oral, will likely need lifelong    - H Pylori neg    - will need to follow-up with GI as outpatient for endoscopy    Anion gap metabolic acidosis    - resolved    Acute renal failure  Chronic kidney disease II    - likely due to dehydration and poor p.o. intake    - resolved    - cont to hold losartan as BPs are low normal    HTN  HLD  CAD s/p PCI x 2 in 2019  History of STEMI in 2019    - presented to [facility] in 2019 with chest pain and was found to have inferior STEMI and had stents placed in the mid circumflex and obtuse marginal artery    - patient is intolerant of statins    - on alirocumab every 2 weeks, resume on discharge    - PTA Diltiazem held on 2/19 due to low/normal BPs, losartan held since admission    - resume ASA (was held for procedure)     - BPs improving: will re-start Diltiazem but at a lower dose (180mg from 240mg)    - cont to hold losartan, but resume if/when appropriate    Hypothyroidism    - continue PTA Synthroid    Hyponatremia    - mild, 133    - resolved    Will update wife when she arrives (I have been meeting with her in the afternoon)      Diet: Advance Diet as Tolerated: Regular Diet Adult    DVT Prophylaxis: Pneumatic Compression Devices  Myrick Catheter: Not present  Lines: None     Cardiac Monitoring: None  Code Status: Full Code      Clinically Significant Risk Factors         # Hyponatremia: Lowest Na = 133 mmol/L in last 2 days, will monitor as appropriate  # Hypochloremia: Lowest Cl = 97 mmol/L in last 2 days, will monitor as appropriate      # Hypoalbuminemia: Lowest albumin = 3 g/dL at 2/17/2025  5:39 AM, will monitor as appropriate  # Coagulation Defect: INR = 1.36 (Ref range: 0.85 - 1.15) and/or PTT = N/A, will monitor for bleeding  # Drug Induced Platelet Defect:  "home medication list includes an antiplatelet medication   # Hypertension: Home medication list includes antihypertensive(s)            # Overweight: Estimated body mass index is 29.54 kg/m  as calculated from the following:    Height as of this encounter: 1.803 m (5' 11\").    Weight as of this encounter: 96.1 kg (211 lb 12.8 oz).             Social Drivers of Health    Tobacco Use: Medium Risk (2/16/2025)    Received from [facility]    Patient History     Smoking Tobacco Use: Former     Smokeless Tobacco Use: Never    [facility]    Social Connections          Disposition Plan     Medically Ready for Discharge: Anticipated in 2-4 Days    The patient's care was discussed with the Bedside Nurse, Patient, and Patient's Family.    Lino Mullins MD  Hospitalist Service  [facility]  Securely message with [employer] (more info)  Text page via [employer] Paging/Directory   ______________________________________________________________________    Interval History   Patient is in the bed  Doing well   No complaints  No chest pain, sob, abdo pain (some discomfort related to drain)  Ambulating throughout the day    Physical Exam   Vital Signs: Temp: 99.7  F (37.6  C) Temp src: Oral BP: (!) 141/78 Pulse: 78   Resp: 16 SpO2: 93 % O2 Device: None (Room air)    Weight: 211 lbs 12.8 oz    GENERAL:  Comfortable.  PSYCH: pleasant, oriented, No acute distress.  HEART:  Normal S1, S2 with no murmur, no pericardial rub, gallops or S3 or S4.  LUNGS:  Clear to auscultation, normal Respiratory effort. No wheezing, rales or ronchi.  GI:  Soft, normal bowel sounds. +NADIA drain with slightly pink tinged drainage (about 15cc, but just flushed)  EXTREMITIES:  No pedal edema  SKIN:  Dry to touch, No rash, wound or ulcerations.    Medical Decision Making       65 MINUTES SPENT BY ME on the date of service doing chart review, history, " exam, documentation & further activities per the note.      Data     I have personally reviewed the following data over the past 24 hrs:    9.8  \   9.7 (L)   / 239     141 106 12.4 /  106 (H)   3.6 24 1.09 \       Imaging results reviewed over the past 24 hrs:   No results found for this or any previous visit (from the past 24 hours).

## 2025-02-21 NOTE — PLAN OF CARE
Goal Outcome Evaluation:      Plan of Care Reviewed With: patient, spouse    Overall Patient Progress: improvingOverall Patient Progress: improving     PRIMARY DIAGNOSIS: Duodenal perforation with hepatic abscess   OUTPATIENT/OBSERVATION GOALS TO BE MET BEFORE DISCHARGE:  Diagnostic test and consults (if applicable) complete: Yes    Vitals signs stable or return to baseline: Yes    Tolerate oral intake to maintain hydration: Yes    Pain status: Pain free.    Tolerating oral antibiotics or has plans for home infusion setup:  Yes    Return to near baseline physical activity: Yes    Discharge Planner Nurse   Safe discharge environment identified: No  Barriers to discharge: Yes       Entered by: Parmjit Dunn RN 02/20/2025    Vitals are Temp: 99.5  F (37.5  C) Temp src: Oral BP: 113/72 Pulse: 76   Resp: 18 SpO2: 96 %.  Patient is Alert and Oriented x4. denying pain.  Patient is Saline locked.    Pt is a Regular diet. He is independent with no assistive devices . NADIA drain intact to bulb suction at abdomen with red bloody output, irrigating NADIA drain q8, Zosyn q6 for intra abdominal infection. VSS on room air.   Please review provider order for any additional goals.   Nurse to notify provider when observation goals have been met and patient is ready for discharge.

## 2025-02-21 NOTE — PLAN OF CARE
"Goal Outcome Evaluation:      Plan of Care Reviewed With: patient, spouse    Overall Patient Progress: improvingOverall Patient Progress: improving    Outcome Evaluation: AOx4. Up ad olivier and ambulating halls throughout shift. significant other at bedside. Pain in abd at  site- only with coughing- declined any interventions. NADIA to bulb suction- irrigating abscess per orders-see MAR.  bright red bloody output. Afebrile. Repeat BC from 2/19/25- NGTD. Continue IV zosyn. ID, Gen surg following. CT abd to be completed tomorrow. GI signed off      Problem: Adult Inpatient Plan of Care  Goal: Plan of Care Review  Description: The Plan of Care Review/Shift note should be completed every shift.  The Outcome Evaluation is a brief statement about your assessment that the patient is improving, declining, or no change.  This information will be displayed automatically on your shift  note.  Outcome: Progressing  Flowsheets (Taken 2/21/2025 1434)  Outcome Evaluation: AOx4. Up ad olivier and ambulating halls throughout shift. significant other at bedside. Pain in abd at  site- only with coughing- declined any interventions. NADIA to bulb suction- irrigating abscess per orders-see MAR.  bright red bloody output. Afebrile. Repeat BC from 2/19/25- NGTD. Continue IV zosyn. ID, Gen surg following. CT abd to be completed tomorrow. GI signed off  Plan of Care Reviewed With:   patient   spouse  Overall Patient Progress: improving  Goal: Patient-Specific Goal (Individualized)  Description: You can add care plan individualizations to a care plan. Examples of Individualization might be:  \"Parent requests to be called daily at 9am for status\", \"I have a hard time hearing out of my right ear\", or \"Do not touch me to wake me up as it startles  me\".  Outcome: Progressing  Goal: Absence of Hospital-Acquired Illness or Injury  Outcome: Progressing  Intervention: Identify and Manage Fall Risk  Recent Flowsheet Documentation  Taken 2/21/2025 0900 by " Kristyn Seymour, RN  Safety Promotion/Fall Prevention:   clutter free environment maintained   nonskid shoes/slippers when out of bed   safety round/check completed  Intervention: Prevent Skin Injury  Recent Flowsheet Documentation  Taken 2/21/2025 0900 by Kristyn Seymour RN  Body Position:   position changed independently   side-lying  Intervention: Prevent Infection  Recent Flowsheet Documentation  Taken 2/21/2025 0900 by Kristyn Seymour RN  Infection Prevention: single patient room provided  Goal: Optimal Comfort and Wellbeing  Outcome: Progressing  Goal: Readiness for Transition of Care  Outcome: Progressing

## 2025-02-22 ENCOUNTER — APPOINTMENT (OUTPATIENT)
Dept: CT IMAGING | Facility: CLINIC | Age: 65
DRG: 871 | End: 2025-02-22
Attending: HOSPITALIST
Payer: MEDICARE

## 2025-02-22 LAB
ANION GAP SERPL CALCULATED.3IONS-SCNC: 10 MMOL/L (ref 7–15)
BACTERIA BLD CULT: ABNORMAL
BACTERIA BLD CULT: ABNORMAL
BASOPHILS # BLD AUTO: 0 10E3/UL (ref 0–0.2)
BASOPHILS NFR BLD AUTO: 0 %
BUN SERPL-MCNC: 10.8 MG/DL (ref 8–23)
CALCIUM SERPL-MCNC: 8.1 MG/DL (ref 8.8–10.4)
CHLORIDE SERPL-SCNC: 104 MMOL/L (ref 98–107)
CREAT SERPL-MCNC: 0.98 MG/DL (ref 0.67–1.17)
EGFRCR SERPLBLD CKD-EPI 2021: 86 ML/MIN/1.73M2
EOSINOPHIL # BLD AUTO: 0.1 10E3/UL (ref 0–0.7)
EOSINOPHIL NFR BLD AUTO: 1 %
ERYTHROCYTE [DISTWIDTH] IN BLOOD BY AUTOMATED COUNT: 13.7 % (ref 10–15)
GLUCOSE SERPL-MCNC: 104 MG/DL (ref 70–99)
HCO3 SERPL-SCNC: 25 MMOL/L (ref 22–29)
HCT VFR BLD AUTO: 31 % (ref 40–53)
HGB BLD-MCNC: 10 G/DL (ref 13.3–17.7)
IMM GRANULOCYTES # BLD: 0.1 10E3/UL
IMM GRANULOCYTES NFR BLD: 1 %
LYMPHOCYTES # BLD AUTO: 1 10E3/UL (ref 0.8–5.3)
LYMPHOCYTES NFR BLD AUTO: 13 %
MCH RBC QN AUTO: 27.6 PG (ref 26.5–33)
MCHC RBC AUTO-ENTMCNC: 32.3 G/DL (ref 31.5–36.5)
MCV RBC AUTO: 86 FL (ref 78–100)
MONOCYTES # BLD AUTO: 0.7 10E3/UL (ref 0–1.3)
MONOCYTES NFR BLD AUTO: 9 %
NEUTROPHILS # BLD AUTO: 6.3 10E3/UL (ref 1.6–8.3)
NEUTROPHILS NFR BLD AUTO: 76 %
NRBC # BLD AUTO: 0 10E3/UL
NRBC BLD AUTO-RTO: 0 /100
PLATELET # BLD AUTO: 266 10E3/UL (ref 150–450)
POTASSIUM SERPL-SCNC: 3.5 MMOL/L (ref 3.4–5.3)
RBC # BLD AUTO: 3.62 10E6/UL (ref 4.4–5.9)
SODIUM SERPL-SCNC: 139 MMOL/L (ref 135–145)
WBC # BLD AUTO: 8.3 10E3/UL (ref 4–11)

## 2025-02-22 PROCEDURE — 36415 COLL VENOUS BLD VENIPUNCTURE: CPT | Performed by: HOSPITALIST

## 2025-02-22 PROCEDURE — 99232 SBSQ HOSP IP/OBS MODERATE 35: CPT | Performed by: INTERNAL MEDICINE

## 2025-02-22 PROCEDURE — 250N000009 HC RX 250: Performed by: HOSPITALIST

## 2025-02-22 PROCEDURE — 82310 ASSAY OF CALCIUM: CPT | Performed by: HOSPITALIST

## 2025-02-22 PROCEDURE — 250N000013 HC RX MED GY IP 250 OP 250 PS 637: Performed by: HOSPITALIST

## 2025-02-22 PROCEDURE — 250N000011 HC RX IP 250 OP 636: Performed by: HOSPITALIST

## 2025-02-22 PROCEDURE — 250N000013 HC RX MED GY IP 250 OP 250 PS 637: Performed by: INTERNAL MEDICINE

## 2025-02-22 PROCEDURE — 250N000011 HC RX IP 250 OP 636: Performed by: PHYSICIAN ASSISTANT

## 2025-02-22 PROCEDURE — 74177 CT ABD & PELVIS W/CONTRAST: CPT

## 2025-02-22 PROCEDURE — 85014 HEMATOCRIT: CPT | Performed by: HOSPITALIST

## 2025-02-22 PROCEDURE — 80048 BASIC METABOLIC PNL TOTAL CA: CPT | Performed by: HOSPITALIST

## 2025-02-22 PROCEDURE — 120N000001 HC R&B MED SURG/OB

## 2025-02-22 PROCEDURE — 250N000011 HC RX IP 250 OP 636: Performed by: INTERNAL MEDICINE

## 2025-02-22 PROCEDURE — 82565 ASSAY OF CREATININE: CPT | Performed by: HOSPITALIST

## 2025-02-22 PROCEDURE — 85025 COMPLETE CBC W/AUTO DIFF WBC: CPT | Performed by: HOSPITALIST

## 2025-02-22 RX ORDER — IOPAMIDOL 755 MG/ML
500 INJECTION, SOLUTION INTRAVASCULAR ONCE
Status: COMPLETED | OUTPATIENT
Start: 2025-02-22 | End: 2025-02-22

## 2025-02-22 RX ORDER — FUROSEMIDE 10 MG/ML
40 INJECTION INTRAMUSCULAR; INTRAVENOUS ONCE
Status: COMPLETED | OUTPATIENT
Start: 2025-02-22 | End: 2025-02-22

## 2025-02-22 RX ADMIN — ENOXAPARIN SODIUM 40 MG: 40 INJECTION SUBCUTANEOUS at 12:24

## 2025-02-22 RX ADMIN — PIPERACILLIN AND TAZOBACTAM 3.38 G: 3; .375 INJECTION, POWDER, FOR SOLUTION INTRAVENOUS at 23:42

## 2025-02-22 RX ADMIN — SODIUM CHLORIDE 65 ML: 9 INJECTION, SOLUTION INTRAVENOUS at 09:14

## 2025-02-22 RX ADMIN — PIPERACILLIN AND TAZOBACTAM 3.38 G: 3; .375 INJECTION, POWDER, FOR SOLUTION INTRAVENOUS at 06:00

## 2025-02-22 RX ADMIN — PIPERACILLIN AND TAZOBACTAM 3.38 G: 3; .375 INJECTION, POWDER, FOR SOLUTION INTRAVENOUS at 18:42

## 2025-02-22 RX ADMIN — PIPERACILLIN AND TAZOBACTAM 3.38 G: 3; .375 INJECTION, POWDER, FOR SOLUTION INTRAVENOUS at 12:24

## 2025-02-22 RX ADMIN — ASPIRIN 81 MG: 81 TABLET, COATED ORAL at 08:03

## 2025-02-22 RX ADMIN — PIPERACILLIN AND TAZOBACTAM 3.38 G: 3; .375 INJECTION, POWDER, FOR SOLUTION INTRAVENOUS at 00:36

## 2025-02-22 RX ADMIN — PANTOPRAZOLE SODIUM 40 MG: 40 TABLET, DELAYED RELEASE ORAL at 08:03

## 2025-02-22 RX ADMIN — FUROSEMIDE 40 MG: 10 INJECTION, SOLUTION INTRAMUSCULAR; INTRAVENOUS at 16:25

## 2025-02-22 RX ADMIN — LEVOTHYROXINE SODIUM 50 MCG: 0.05 TABLET ORAL at 08:03

## 2025-02-22 RX ADMIN — DILTIAZEM HYDROCHLORIDE 180 MG: 180 CAPSULE, COATED, EXTENDED RELEASE ORAL at 08:03

## 2025-02-22 RX ADMIN — IOPAMIDOL 100 ML: 755 INJECTION, SOLUTION INTRAVENOUS at 09:14

## 2025-02-22 ASSESSMENT — ACTIVITIES OF DAILY LIVING (ADL)
ADLS_ACUITY_SCORE: 33

## 2025-02-22 NOTE — CARE PLAN
Provider paged out after consulting with IR based on today's CT results. Received orders to flush liver abscess drain daily with 5cc NS only and discontinue previous irrigation orders.    Statement Selected

## 2025-02-22 NOTE — PROGRESS NOTES
[facility]    Medicine Progress Note - Hospitalist Service    Date of Admission:  2/16/2025    Assessment & Plan      Victoriano Bourgeois is a 64 year old male admitted on 2/16/2025. He has PMHx most notable for HTN, HLD, STEMI, CAD s/p PCI x 2, and hypothyroidism that presents with reported fever, chills and has findings concerning for sepsis of unknown source and acute renal failure secondary to dehydration.    MRCP/CT show: duodenitis vs ulcer with some air seen on CT   Repeated CT with contrast: likely contained duodenal perforation with hepatic abscess  Strep intermedius Bacteremia  S/p IR drain placement in hepatic abscess (2/19)  GI  signed off  ID/Surgery following  Cultures still pending    Severe Sepsis with Associated Organ Dysfunction of Acute renal failure and Lactic acidosis   Likely intra-abdominal source  Possible perforated ulcer/duodenitis  Strep intermedius bacteremia    - patient presented with fever (to 104) and chills, fatigue and a few episodes of diarrhea    - decreased appetite x months    - UA-culture/CXR unrevealing    - CT abdo/pelvis: ill-defined soft tissue with inflammatory changes and air noted adjacent to pancreatic head and second portion of duodenum which could be related duodenitis, pancreatitis or malignancy    - MRCP: duodenitis vs ulcer    - received ceftriaxone x 2 days, now changed to zosyn on 2/18  to cover intra-abdominal infection    - interestingly patient denies abdominal pain and does not have tenderness on exam    - CT with contrast 2/18 showed likely contained duodenal perforation with hepatic abscess    - GI/Surgery involved    - IR for drain (placed on 2/19), cultures still pending    - started IV PPI BID, change to oral PPI (lifelong)    - ID consulted to guide antibiotic duration    - Blood cultures + for strep intermedius (from 2/16), repeated on 2/19 (neg so far)    - Tmax 101.5 on 2/21/2025  spoke with ID. Given continued fevers, recommending  repeat CT in am (ordered). If there are concerns with the CT, Surgery will need to be re-consulted as they signed off.  CT repeat done on 2/22/2025 results are currently pending      Addendum;  CT repeat done on 9 2/22/2025 results reviewed showed the liver abscesses decreased in size with residual abscess cavity measuring 3.9 x 2.2 cm.  But there is a interval increase in size of the fluid collection along the pancreatic neck which now extends along the surface of the caudate lobe and measures up to 8.7 cm.    Called IR on-call team and discussed with IR doc.  They are going to review CT and decide about how to best manage the fluid collection.  And they will get back to us.    Duodenal ulcer with perforation    - as seen on CT    - GI signed off    - IV PPI now changed to oral, will most likely be on PPI twice daily for 8 weeks and then changed to once daily    - H Pylori neg    - will need to follow-up with GI as outpatient for endoscopy    Anion gap metabolic acidosis    - resolved    Acute renal failure  Chronic kidney disease II    - likely due to dehydration and poor p.o. intake    - resolved    - cont to hold losartan as BPs are low normal    HTN  HLD  CAD s/p PCI x 2 in 2019  History of STEMI in 2019    - presented to [facility] in 2019 with chest pain and was found to have inferior STEMI and had stents placed in the mid circumflex and obtuse marginal artery    - patient is intolerant of statins    - on alirocumab every 2 weeks, resume on discharge    - PTA Diltiazem held on 2/19 due to low/normal BPs, losartan held since admission    - resume ASA (was held for procedure)     - BPs improving: will re-start Diltiazem but at a lower dose (180mg from 240mg)    - cont to hold losartan, but resume if/when appropriate    Hypothyroidism    - continue PTA Synthroid    Hyponatremia    - mild, 133    - resolved         Diet: Advance Diet as Tolerated: Regular Diet Adult    DVT Prophylaxis: Pneumatic Compression  "Devices  Myrick Catheter: Not present  Lines: None     Cardiac Monitoring: None  Code Status: Full Code      Clinically Significant Risk Factors         # Hyponatremia: Lowest Na = 133 mmol/L in last 2 days, will monitor as appropriate  # Hypochloremia: Lowest Cl = 97 mmol/L in last 2 days, will monitor as appropriate      # Hypoalbuminemia: Lowest albumin = 3 g/dL at 2/17/2025  5:39 AM, will monitor as appropriate  # Coagulation Defect: INR = 1.36 (Ref range: 0.85 - 1.15) and/or PTT = N/A, will monitor for bleeding  # Drug Induced Platelet Defect: home medication list includes an antiplatelet medication   # Hypertension: Home medication list includes antihypertensive(s)            # Overweight: Estimated body mass index is 29.54 kg/m  as calculated from the following:    Height as of this encounter: 1.803 m (5' 11\").    Weight as of this encounter: 96.1 kg (211 lb 12.8 oz).             Social Drivers of Health    Tobacco Use: Medium Risk (2/16/2025)    Received from [facility]    Patient History     Smoking Tobacco Use: Former     Smokeless Tobacco Use: Never    [facility]    Social Connections          Disposition Plan         Medically Ready for Discharge: Anticipated in 2-4 Days    The patient's care was discussed with the Bedside Nurse, Patient, and Patient's Family.    Isabelle Hussein MD  Hospitalist Service  [facility]  Securely message with [employer] (more info)  Text page via [employer] Paging/Directory   ______________________________________________________________________    Interval History   Patient is in the bed  Doing well.  Denies any nausea vomiting.  Mild discomfort at the drain site otherwise no abdominal pain.  Repeat CT scan of the abdomen done results are currently pending.  No other acute issues      Physical Exam   Vital Signs: Temp: 98.7  F (37.1  C) Temp src: Oral BP: 133/76 Pulse: " 68   Resp: 18 SpO2: 98 % O2 Device: None (Room air)    Weight: 211 lbs 12.8 oz    GENERAL:  Comfortable.  PSYCH: pleasant, oriented, No acute distress.  HEART:  Normal S1, S2 with no murmur, no pericardial rub, gallops or S3 or S4.  LUNGS:  Clear to auscultation, normal Respiratory effort. No wheezing, rales or ronchi.  GI:  Soft, normal bowel sounds. +NADIA drain with slightly pink tinged drainage (about 10cc)  EXTREMITIES:  No pedal edema  SKIN:  Dry to touch, No rash, wound or ulcerations.    Medical Decision Making       65 MINUTES SPENT BY ME on the date of service doing chart review, history, exam, documentation & further activities per the note.      Data     I have personally reviewed the following data over the past 24 hrs:    8.3  \   10.0 (L)   / 266     139 104 10.8 /  104 (H)   3.5 25 0.98 \       Imaging results reviewed over the past 24 hrs:   No results found for this or any previous visit (from the past 24 hours).

## 2025-02-22 NOTE — PLAN OF CARE
"Patient is alert and oriented x 4. Denies nausea, vomiting, fever, chills or fatigue. Denies any abd pain. VS WNL and documented on the FS. Lung sounds clear and patient is on RA. Active bowel sounds with LBM today. Patient denies any pain, urgency, and frequency when voiding. Pt is tolerating regular diet. IV SL. NADIA to bulb suction drain in place with 5 ml daily flush orders to irrigate abscess- light pink output noted. Zosyn Q6. Patient denies any pain. Patient is independent in the room with transfers.     Plan: Repeat CT completed. ID following. Continue POC.     BP (!) 145/84 (BP Location: Right arm)   Pulse 60   Temp 99.3  F (37.4  C) (Oral)   Resp 18   Ht 1.803 m (5' 11\")   Wt 96.1 kg (211 lb 12.8 oz)   SpO2 97%   BMI 29.54 kg/m       Goal Outcome Evaluation:      Plan of Care Reviewed With: patient    Overall Patient Progress: improvingOverall Patient Progress: improving      Problem: Adult Inpatient Plan of Care  Goal: Plan of Care Review  Description: The Plan of Care Review/Shift note should be completed every shift.  The Outcome Evaluation is a brief statement about your assessment that the patient is improving, declining, or no change.  This information will be displayed automatically on your shift  note.  Outcome: Progressing  Flowsheets (Taken 2/22/2025 1447)  Plan of Care Reviewed With: patient  Overall Patient Progress: improving  Goal: Patient-Specific Goal (Individualized)  Description: You can add care plan individualizations to a care plan. Examples of Individualization might be:  \"Parent requests to be called daily at 9am for status\", \"I have a hard time hearing out of my right ear\", or \"Do not touch me to wake me up as it startles  me\".  Outcome: Progressing  Goal: Absence of Hospital-Acquired Illness or Injury  Outcome: Progressing  Intervention: Identify and Manage Fall Risk  Recent Flowsheet Documentation  Taken 2/22/2025 0800 by Missael Wright RN  Safety Promotion/Fall " Prevention:   assistive device/personal items within reach   clutter free environment maintained   nonskid shoes/slippers when out of bed   safety round/check completed  Intervention: Prevent Skin Injury  Recent Flowsheet Documentation  Taken 2/22/2025 0800 by Missael Wright RN  Body Position:   position changed independently   side-lying  Intervention: Prevent and Manage VTE (Venous Thromboembolism) Risk  Recent Flowsheet Documentation  Taken 2/22/2025 0800 by Missael Wright RN  VTE Prevention/Management: SCDs off (sequential compression devices)  Goal: Optimal Comfort and Wellbeing  Outcome: Progressing  Goal: Readiness for Transition of Care  Outcome: Progressing     Problem: Infection  Goal: Absence of Infection Signs and Symptoms  Outcome: Progressing     Problem: Fatigue  Goal: Improved Activity Tolerance  Outcome: Progressing  Intervention: Promote Improved Energy  Recent Flowsheet Documentation  Taken 2/22/2025 0800 by Missael Wright RN  Activity Management: up ad olivier     Problem: Comorbidity Management  Goal: Blood Pressure in Desired Range  Outcome: Progressing  Intervention: Maintain Blood Pressure Management  Recent Flowsheet Documentation  Taken 2/22/2025 0800 by Missael Wright RN  Medication Review/Management: medications reviewed  Goal: Maintenance of Asthma Control  Outcome: Progressing  Intervention: Maintain Asthma Symptom Control  Recent Flowsheet Documentation  Taken 2/22/2025 0800 by Missael Wright RN  Medication Review/Management: medications reviewed  Goal: Maintenance of Behavioral Health Symptom Control  Outcome: Progressing  Intervention: Maintain Behavioral Health Symptom Control  Recent Flowsheet Documentation  Taken 2/22/2025 0800 by Missael Wright RN  Medication Review/Management: medications reviewed  Goal: Maintenance of COPD Symptom Control  Outcome: Progressing  Intervention: Maintain COPD Symptom Control  Recent Flowsheet Documentation  Taken 2/22/2025 0800 by  Missael Wright RN  Medication Review/Management: medications reviewed  Goal: Blood Glucose Levels Within Targeted Range  Outcome: Progressing  Intervention: Monitor and Manage Glycemia  Recent Flowsheet Documentation  Taken 2/22/2025 0800 by Missael Wright RN  Medication Review/Management: medications reviewed  Goal: Maintenance of Heart Failure Symptom Control  Outcome: Progressing  Intervention: Maintain Heart Failure Management  Recent Flowsheet Documentation  Taken 2/22/2025 0800 by Missael Wright RN  Medication Review/Management: medications reviewed  Goal: Maintenance of Osteoarthritis Symptom Control  Outcome: Progressing  Intervention: Maintain Osteoarthritis Symptom Control  Recent Flowsheet Documentation  Taken 2/22/2025 0800 by Missael Wright RN  Activity Management: up ad olivier  Medication Review/Management: medications reviewed  Goal: Bariatric Home Regimen Maintained  Outcome: Progressing  Intervention: Maintain and Manage Postbariatric Surgery Care  Recent Flowsheet Documentation  Taken 2/22/2025 0800 by Missael Wright RN  Medication Review/Management: medications reviewed  Goal: Maintenance of Seizure Control  Outcome: Progressing  Intervention: Maintain Seizure Symptom Control  Recent Flowsheet Documentation  Taken 2/22/2025 0800 by Missael Wright RN  Medication Review/Management: medications reviewed

## 2025-02-22 NOTE — PLAN OF CARE
"Pt is Aox4 and calm. Pt is up IND in room. Pt denies pain. IV is saline locked and pt tolerated IV Zosyn. Pt is on regular diet. Pt denies pain and has been resting peacefully during the night. Flushing NADIA drain with 10mL flushes per orders in MAR, output to be checked when IV antibiotic is done infusing. Surg and GI signed off, Infectious Disease following. Continue to follow plan of care.     /87 (BP Location: Right arm)   Pulse 68   Temp 98.5  F (36.9  C)   Resp 18   Ht 1.803 m (5' 11\")   Wt 96.1 kg (211 lb 12.8 oz)   SpO2 96%   BMI 29.54 kg/m             Goal Outcome Evaluation:      Plan of Care Reviewed With: patient    Overall Patient Progress: improvingOverall Patient Progress: improving    Outcome Evaluation: Pt AOx4 and calm. tolerating IV antibiotics. VSS on room air. up IND      Problem: Adult Inpatient Plan of Care  Goal: Plan of Care Review  Description: The Plan of Care Review/Shift note should be completed every shift.  The Outcome Evaluation is a brief statement about your assessment that the patient is improving, declining, or no change.  This information will be displayed automatically on your shift  note.  Outcome: Progressing  Flowsheets (Taken 2/22/2025 0604)  Outcome Evaluation: Pt AOx4 and calm. tolerating IV antibiotics. VSS on room air. up IND  Plan of Care Reviewed With: patient  Overall Patient Progress: improving  Goal: Patient-Specific Goal (Individualized)  Description: You can add care plan individualizations to a care plan. Examples of Individualization might be:  \"Parent requests to be called daily at 9am for status\", \"I have a hard time hearing out of my right ear\", or \"Do not touch me to wake me up as it startles  me\".  Outcome: Progressing  Goal: Absence of Hospital-Acquired Illness or Injury  Outcome: Progressing  Intervention: Identify and Manage Fall Risk  Recent Flowsheet Documentation  Taken 2/22/2025 0034 by Garo Mai RN  Safety Promotion/Fall " Prevention:   assistive device/personal items within reach   clutter free environment maintained   nonskid shoes/slippers when out of bed   safety round/check completed  Intervention: Prevent Skin Injury  Recent Flowsheet Documentation  Taken 2/22/2025 0034 by Garo Mai RN  Body Position:   position changed independently   side-lying  Intervention: Prevent and Manage VTE (Venous Thromboembolism) Risk  Recent Flowsheet Documentation  Taken 2/22/2025 0034 by Garo Mai RN  VTE Prevention/Management: SCDs off (sequential compression devices)  Goal: Optimal Comfort and Wellbeing  Outcome: Progressing  Intervention: Monitor Pain and Promote Comfort  Recent Flowsheet Documentation  Taken 2/22/2025 0034 by Garo Mai RN  Pain Management Interventions: rest  Goal: Readiness for Transition of Care  Outcome: Progressing     Problem: Infection  Goal: Absence of Infection Signs and Symptoms  Outcome: Progressing     Problem: Fatigue  Goal: Improved Activity Tolerance  Outcome: Progressing  Intervention: Promote Improved Energy  Recent Flowsheet Documentation  Taken 2/22/2025 0034 by Garo Mai RN  Activity Management: up ad olivier     Problem: Comorbidity Management  Goal: Blood Pressure in Desired Range  Outcome: Progressing  Intervention: Maintain Blood Pressure Management  Recent Flowsheet Documentation  Taken 2/22/2025 0034 by Garo Mai RN  Medication Review/Management: medications reviewed  Goal: Maintenance of Asthma Control  Outcome: Progressing  Intervention: Maintain Asthma Symptom Control  Recent Flowsheet Documentation  Taken 2/22/2025 0034 by Garo Mai RN  Medication Review/Management: medications reviewed  Goal: Maintenance of Behavioral Health Symptom Control  Outcome: Progressing  Intervention: Maintain Behavioral Health Symptom Control  Recent Flowsheet Documentation  Taken 2/22/2025 0034 by Garo Mai RN  Medication Review/Management: medications reviewed  Goal: Maintenance of COPD  Symptom Control  Outcome: Progressing  Intervention: Maintain COPD Symptom Control  Recent Flowsheet Documentation  Taken 2/22/2025 0034 by Garo Mai RN  Medication Review/Management: medications reviewed  Goal: Blood Glucose Levels Within Targeted Range  Outcome: Progressing  Intervention: Monitor and Manage Glycemia  Recent Flowsheet Documentation  Taken 2/22/2025 0034 by Garo Mai RN  Medication Review/Management: medications reviewed  Goal: Maintenance of Heart Failure Symptom Control  Outcome: Progressing  Intervention: Maintain Heart Failure Management  Recent Flowsheet Documentation  Taken 2/22/2025 0034 by Garo Mai RN  Medication Review/Management: medications reviewed  Goal: Maintenance of Osteoarthritis Symptom Control  Outcome: Progressing  Intervention: Maintain Osteoarthritis Symptom Control  Recent Flowsheet Documentation  Taken 2/22/2025 0034 by Garo Mai RN  Activity Management: up ad olivier  Medication Review/Management: medications reviewed  Goal: Bariatric Home Regimen Maintained  Outcome: Progressing  Intervention: Maintain and Manage Postbariatric Surgery Care  Recent Flowsheet Documentation  Taken 2/22/2025 0034 by Garo Mai RN  Medication Review/Management: medications reviewed  Goal: Maintenance of Seizure Control  Outcome: Progressing  Intervention: Maintain Seizure Symptom Control  Recent Flowsheet Documentation  Taken 2/22/2025 0034 by Garo Mai RN  Medication Review/Management: medications reviewed

## 2025-02-22 NOTE — PLAN OF CARE
Goal Outcome Evaluation:      Plan of Care Reviewed With: patient    Overall Patient Progress: improvingOverall Patient Progress: improving     PRIMARY DIAGNOSIS: Duodenal perforation with hepatic abscess   OUTPATIENT/OBSERVATION GOALS TO BE MET BEFORE DISCHARGE:  Diagnostic test and consults (if applicable) complete: Yes    Vitals signs stable or return to baseline: Yes    Tolerate oral intake to maintain hydration: Yes    Pain status: Pain free.    Tolerating oral antibiotics or has plans for home infusion setup:  Yes    Return to near baseline physical activity: Yes    Discharge Planner Nurse   Safe discharge environment identified: No  Barriers to discharge: Yes       Entered by: Parmjit Dunn RN 02/21/2025    Vitals are Temp: 100.6  F (38.1  C) Temp src: Oral BP: 125/75 Pulse: 73   Resp: 18 SpO2: 98 %.  Patient is Alert and Oriented x4. denying pain.  Patient is Saline locked.    Pt is a Regular diet. He is independent with no assistive devices . NADIA drain intact to bulb suction at abdomen with light pink output, 10 ml output this shift, irrigating NADIA drain q8, Zosyn q6 for intra abdominal infection. VSS on room air.   Please review provider order for any additional goals.   Nurse to notify provider when observation goals have been met and patient is ready for discharge.

## 2025-02-23 PROCEDURE — 250N000013 HC RX MED GY IP 250 OP 250 PS 637: Performed by: INTERNAL MEDICINE

## 2025-02-23 PROCEDURE — 99232 SBSQ HOSP IP/OBS MODERATE 35: CPT | Performed by: INTERNAL MEDICINE

## 2025-02-23 PROCEDURE — 120N000001 HC R&B MED SURG/OB

## 2025-02-23 PROCEDURE — 250N000013 HC RX MED GY IP 250 OP 250 PS 637: Performed by: HOSPITALIST

## 2025-02-23 PROCEDURE — 250N000011 HC RX IP 250 OP 636: Performed by: HOSPITALIST

## 2025-02-23 RX ADMIN — PIPERACILLIN AND TAZOBACTAM 3.38 G: 3; .375 INJECTION, POWDER, FOR SOLUTION INTRAVENOUS at 19:07

## 2025-02-23 RX ADMIN — ASPIRIN 81 MG: 81 TABLET, COATED ORAL at 08:25

## 2025-02-23 RX ADMIN — LEVOTHYROXINE SODIUM 50 MCG: 0.05 TABLET ORAL at 08:25

## 2025-02-23 RX ADMIN — DILTIAZEM HYDROCHLORIDE 180 MG: 180 CAPSULE, COATED, EXTENDED RELEASE ORAL at 08:25

## 2025-02-23 RX ADMIN — PIPERACILLIN AND TAZOBACTAM 3.38 G: 3; .375 INJECTION, POWDER, FOR SOLUTION INTRAVENOUS at 06:35

## 2025-02-23 RX ADMIN — PIPERACILLIN AND TAZOBACTAM 3.38 G: 3; .375 INJECTION, POWDER, FOR SOLUTION INTRAVENOUS at 13:06

## 2025-02-23 RX ADMIN — PANTOPRAZOLE SODIUM 40 MG: 40 TABLET, DELAYED RELEASE ORAL at 06:35

## 2025-02-23 ASSESSMENT — ACTIVITIES OF DAILY LIVING (ADL)
ADLS_ACUITY_SCORE: 33
ADLS_ACUITY_SCORE: 37
ADLS_ACUITY_SCORE: 33
ADLS_ACUITY_SCORE: 37
ADLS_ACUITY_SCORE: 37
ADLS_ACUITY_SCORE: 33
ADLS_ACUITY_SCORE: 33
ADLS_ACUITY_SCORE: 37
ADLS_ACUITY_SCORE: 33
ADLS_ACUITY_SCORE: 33
ADLS_ACUITY_SCORE: 37
ADLS_ACUITY_SCORE: 37
ADLS_ACUITY_SCORE: 33

## 2025-02-23 NOTE — PLAN OF CARE
"Goal Outcome Evaluation:      Plan of Care Reviewed With: patient          Outcome Evaluation: AOX4. VSS on RA. IV antibiotics given. NADIA drain in abdomen.        Problem: Adult Inpatient Plan of Care  Goal: Plan of Care Review  Description: The Plan of Care Review/Shift note should be completed every shift.  The Outcome Evaluation is a brief statement about your assessment that the patient is improving, declining, or no change.  This information will be displayed automatically on your shift  note.  Outcome: Adequate for Care Transition  Flowsheets (Taken 2/23/2025 4757)  Outcome Evaluation: AOX4. VSS on RA. IV antibiotics given. NADIA drain in abdomen.  Plan of Care Reviewed With: patient  Goal: Patient-Specific Goal (Individualized)  Description: You can add care plan individualizations to a care plan. Examples of Individualization might be:  \"Parent requests to be called daily at 9am for status\", \"I have a hard time hearing out of my right ear\", or \"Do not touch me to wake me up as it startles  me\".  Outcome: Adequate for Care Transition  Goal: Absence of Hospital-Acquired Illness or Injury  Outcome: Adequate for Care Transition  Goal: Optimal Comfort and Wellbeing  Outcome: Adequate for Care Transition  Goal: Readiness for Transition of Care  Outcome: Adequate for Care Transition     Problem: Infection  Goal: Absence of Infection Signs and Symptoms  Outcome: Adequate for Care Transition     Problem: Fatigue  Goal: Improved Activity Tolerance  Outcome: Adequate for Care Transition     Problem: Comorbidity Management  Goal: Blood Pressure in Desired Range  Outcome: Adequate for Care Transition  Goal: Maintenance of Asthma Control  Outcome: Adequate for Care Transition  Goal: Maintenance of Behavioral Health Symptom Control  Outcome: Adequate for Care Transition  Goal: Maintenance of COPD Symptom Control  Outcome: Adequate for Care Transition  Goal: Blood Glucose Levels Within Targeted Range  Outcome: Adequate for Care " Transition  Goal: Maintenance of Heart Failure Symptom Control  Outcome: Adequate for Care Transition  Goal: Maintenance of Osteoarthritis Symptom Control  Outcome: Adequate for Care Transition  Goal: Bariatric Home Regimen Maintained  Outcome: Adequate for Care Transition  Goal: Maintenance of Seizure Control  Outcome: Adequate for Care Transition

## 2025-02-23 NOTE — PLAN OF CARE
Goal Outcome Evaluation:      Plan of Care Reviewed With: patient    Overall Patient Progress: improvingOverall Patient Progress: improving     PRIMARY DIAGNOSIS: Duodenal perforation with hepatic abscess   OUTPATIENT/OBSERVATION GOALS TO BE MET BEFORE DISCHARGE:  Diagnostic test and consults (if applicable) complete: Yes    Vitals signs stable or return to baseline: Yes    Tolerate oral intake to maintain hydration: Yes    Pain status: Pain free.    Tolerating oral antibiotics or has plans for home infusion setup:  Yes    Return to near baseline physical activity: Yes    Discharge Planner Nurse   Safe discharge environment identified: No  Barriers to discharge: Yes       Entered by: Parmjit Dunn RN 02/22/2025    Vitals are Temp: 98.6  F (37  C) Temp src: Oral BP: 127/78 Pulse: 60   Resp: 18 SpO2: 95 %.  Patient is Alert and Oriented x4. denying pain.  Patient is Saline locked.    Pt is a Regular diet. He is independent with no assistive devices . NADIA drain intact to bulb suction at abdomen with light pink output, irrigating NADIA drain daily, Zosyn q6 for intra abdominal infection. VSS on room air.   Please review provider order for any additional goals.   Nurse to notify provider when observation goals have been met and patient is ready for discharge.

## 2025-02-23 NOTE — PROGRESS NOTES
"[facility]  Infectious Disease Progress Note    Date of Service (when I saw the patient): 02/22/2025     Assessment & Plan   Victoriano Bourgeois is a 64 year old male who was admitted on 2/16/2025.     Impression:  55 yo with PMH of HTN, HLD, STEMI, CAD s/p PCI x 2, and hypothyroidism   Presented with reported fever, chills   MRCP/CT show: duodenitis vs ulcer with some air seen on CT   Repeated CT with contrast: likely contained duodenal perforation with hepatic abscess  S/p drain placement   On zosyn   Blood culture with strep intermedius   Abscesses cultures growing Strep intermedius      Recommendations   Continue the iv zosyn for now with oral conversion on discharge      Bruce Gamez MD    Interval History   Fevers are down  He feels better he reports  Eating well now  Denies abd pain  More awake   Abscess cx with Strep intermedius    New CT today showed, \" Interval placement of an appropriately positioned left transhepatic drain into the previous caudate lobe abscess. The abscess has decreased in size, with a small amount of fluid and gas remaining within the abscess cavity surrounding the drain.   2.  Interval increase in size of a fluid collection along the pancreatic neck, which now extends along the surface of the caudate lobe and measures up to 8.7 cm..  A tiny focus of extraluminal gas and fluid within the portacaval space has not significantly changed.  No additional perihepatic or periduodenal fluid collections.  Small amount of ascites is similar in volume. No free intraperitoneal air.  New small bilateral pleural effusions with adjacent atelectasis.No new rashes or issues with antibiotics   Labs reviewed   No changes to past medical, social or family history   Feels better but still had a fever this morning       Physical Exam   Temp: 99.3  F (37.4  C) Temp src: Oral BP: (!) 145/84 (RN notified) Pulse: 60   Resp: 18 SpO2: 97 % O2 Device: None (Room air)    Vitals:    02/16/25 " 1659 02/17/25 0013   Weight: 94.8 kg (208 lb 15.9 oz) 96.1 kg (211 lb 12.8 oz)     Vital Signs with Ranges  Temp:  [97.8  F (36.6  C)-99.3  F (37.4  C)] 99.3  F (37.4  C)  Pulse:  [60-68] 60  Resp:  [18] 18  BP: (129-154)/(75-87) 145/84  SpO2:  [93 %-98 %] 97 %  HEENT:, scleral anicteric , no scleral hemorrhages,   Op clear  Neck supple, no SHANTHI  CV: RRR   Lungs faint rales in bases bilat  Abd soft, NT, ND, drain exiting upper middle abd  Ext; no c/c/e      Medications   Current Facility-Administered Medications   Medication Dose Route Frequency Provider Last Rate Last Admin     Current Facility-Administered Medications   Medication Dose Route Frequency Provider Last Rate Last Admin    aspirin EC tablet 81 mg  81 mg Oral Daily Lino Mullins MD   81 mg at 02/22/25 0803    diltiazem ER COATED BEADS (CARDIZEM CD/CARTIA XT) 24 hr capsule 180 mg  180 mg Oral Daily Lino Mullins MD   180 mg at 02/22/25 0803    enoxaparin ANTICOAGULANT (LOVENOX) injection 40 mg  40 mg Subcutaneous Q24H Zenaida Napoles PA-C   40 mg at 02/22/25 1224    levothyroxine (SYNTHROID/LEVOTHROID) tablet 50 mcg  50 mcg Oral Daily Elías Chavarria MD   50 mcg at 02/22/25 0803    [Held by provider] losartan (COZAAR) tablet 100 mg  100 mg Oral Daily Zenaida Napoles PA-C        pantoprazole (PROTONIX) EC tablet 40 mg  40 mg Oral QAM AC Lino Mullins MD   40 mg at 02/22/25 0803    piperacillin-tazobactam (ZOSYN) 3.375 g vial to attach to  mL bag  3.375 g Intravenous Q6H Lino Mullins MD   3.375 g at 02/22/25 1224    sodium chloride (PF) 0.9% PF flush 10 mL  10 mL Irrigation Q8H [facility] CNP   10 mL at 02/22/25 0804    sodium chloride (PF) 0.9% PF flush 10 mL  10 mL Irrigation Q8H Winston Goldberg MD   10 mL at 02/22/25 0804    sodium chloride (PF) 0.9% PF flush 3 mL  3 mL Intracatheter Q8H Elías Chavarria MD   3 mL at 02/22/25 1625       Data   All microbiology laboratory data  "reviewed.  Recent Labs   Lab Test 02/22/25  0712 02/21/25  0636 02/20/25  0839   WBC 8.3 9.8 10.6   HGB 10.0* 9.7* 10.3*   HCT 31.0* 29.6* 31.7*   MCV 86 85 85    239 237     Recent Labs   Lab Test 02/22/25  0712 02/21/25  0636 02/20/25  0839   CR 0.98 1.09 1.02     Recent Labs   Lab Test 08/28/19  1333   SED 10     No lab results found.    Invalid input(s): \"UC\"    All cultures:  Recent Labs   Lab 02/19/25  1321 02/19/25  1222 02/16/25 2001 02/16/25  1900 02/16/25  1716   CULTURE No growth after 3 days  No growth after 3 days No anaerobic organisms isolated after 3 days  4+ Streptococcus intermedius* <10,000 CFU/mL Mixture of Urogenital Oksana Positive on the 2nd day of incubation*  Streptococcus intermedius* No Growth      Blood culture:  Results for orders placed or performed during the hospital encounter of 02/16/25   Blood Culture Arm, Right    Collection Time: 02/19/25  1:21 PM    Specimen: Arm, Right; Blood   Result Value Ref Range    Culture No growth after 3 days    Blood Culture Arm, Right    Collection Time: 02/19/25  1:21 PM    Specimen: Arm, Right; Blood   Result Value Ref Range    Culture No growth after 3 days    Blood Culture Arm, Right    Collection Time: 02/16/25  7:00 PM    Specimen: Arm, Right; Blood   Result Value Ref Range    Culture Positive on the 2nd day of incubation (A)     Culture Streptococcus intermedius (AA)        Susceptibility    Streptococcus intermedius - SHEILA     Penicillin 0.047 Susceptible ug/mL     Ceftriaxone 0.125 Susceptible ug/mL     Meropenem 0.094 Susceptible ug/mL     Vancomycin 1.0 Susceptible ug/mL     Cefotaxime 0.125 Susceptible ug/mL   Blood Culture Peripheral Blood    Collection Time: 02/16/25  5:16 PM    Specimen: Peripheral Blood   Result Value Ref Range    Culture No Growth       Urine culture:  Results for orders placed or performed during the hospital encounter of 02/16/25   Urine Culture    Collection Time: 02/16/25  8:01 PM    Specimen: Urine, NOS "   Result Value Ref Range    Culture <10,000 CFU/mL Mixture of Urogenital Oksana

## 2025-02-23 NOTE — PROGRESS NOTES
[facility]    Medicine Progress Note - Hospitalist Service    Date of Admission:  2/16/2025    Assessment & Plan      Victoriano Bourgeois is a 64 year old male admitted on 2/16/2025. He has PMHx most notable for HTN, HLD, STEMI, CAD s/p PCI x 2, and hypothyroidism that presents with reported fever, chills and has findings concerning for sepsis of unknown source and acute renal failure secondary to dehydration.    MRCP/CT show: duodenitis vs ulcer with some air seen on CT   Repeated CT with contrast: likely contained duodenal perforation with hepatic abscess  Strep intermedius Bacteremia  S/p IR drain placement in hepatic abscess (2/19)  GI  signed off  ID/Surgery following  Drain fluid cultures as well as blood cultures returned positive for Strep intermedius    Severe Sepsis with Associated Organ Dysfunction of Acute renal failure and Lactic acidosis   Likely intra-abdominal source  Possible perforated ulcer/duodenitis  Strep intermedius bacteremia    - patient presented with fever (to 104) and chills, fatigue and a few episodes of diarrhea    - decreased appetite x months    - UA-culture/CXR unrevealing    - CT abdo/pelvis: ill-defined soft tissue with inflammatory changes and air noted adjacent to pancreatic head and second portion of duodenum which could be related duodenitis, pancreatitis or malignancy    - MRCP: duodenitis vs ulcer    - received ceftriaxone x 2 days, now changed to zosyn on 2/18  to cover intra-abdominal infection    - interestingly patient denies abdominal pain and does not have tenderness on exam    - CT with contrast 2/18 showed likely contained duodenal perforation with hepatic abscess    - GI/Surgery involved    - IR for drain (placed on 2/19), cultures still pending    - started IV PPI BID, change to oral PPI (lifelong)    - ID consulted to guide antibiotic duration    - Blood cultures + for strep intermedius (from 2/16), repeated on 2/19 (neg so far)    - Tmax 101.5 on  2/21/2025  spoke with ID. Given continued fevers, recommending repeat CT in am (ordered). If there are concerns with the CT, Surgery will need to be re-consulted as they signed off.  CT repeat done on 9 2/22/2025 results reviewed showed the liver abscesses decreased in size with residual abscess cavity measuring 3.9 x 2.2 cm.  But there is a interval increase in size of the fluid collection along the pancreatic neck which now extends along the surface of the caudate lobe and measures up to 8.7 cm.  Called and discussed with IR team and they thought the saline drain flushes might be going into the caudate lobe area and leading to increased fluid collection.  There is still the drain flushes reduced to 5 mL normal saline daily from 3 times daily on 2 22-25  IR team recommended repeating CT abdomen in the next few days    Need to review with IR on 2/24 to see if they can reposition the current drain to get the other fluid collection seen on repeat CT     Duodenal ulcer with perforation    - as seen on CT    - GI signed off    - IV PPI now changed to oral, will most likely be on PPI twice daily for 8 weeks and then changed to once daily    - H Pylori neg    - will need to follow-up with GI as outpatient for endoscopy    Anion gap metabolic acidosis    - resolved    Acute renal failure  Chronic kidney disease II    - likely due to dehydration and poor p.o. intake    - resolved    - cont to hold losartan as BPs are low normal    HTN  HLD  CAD s/p PCI x 2 in 2019  History of STEMI in 2019    - presented to [facility] in 2019 with chest pain and was found to have inferior STEMI and had stents placed in the mid circumflex and obtuse marginal artery    - patient is intolerant of statins    - on alirocumab every 2 weeks, resume on discharge    - PTA Diltiazem held on 2/19 due to low/normal BPs, losartan held since admission    - resume ASA (was held for procedure)     - BPs improving: will re-start Diltiazem but at a  "lower dose (180mg from 240mg)    - cont to hold losartan, but resume if/when appropriate    Hypothyroidism    - continue PTA Synthroid    Hyponatremia    - mild, 133    - resolved         Diet: Advance Diet as Tolerated: Regular Diet Adult    DVT Prophylaxis: Pneumatic Compression Devices  Myrick Catheter: Not present  Lines: None     Cardiac Monitoring: None  Code Status: Full Code      Clinically Significant Risk Factors         # Hyponatremia: Lowest Na = 133 mmol/L in last 2 days, will monitor as appropriate  # Hypochloremia: Lowest Cl = 97 mmol/L in last 2 days, will monitor as appropriate      # Hypoalbuminemia: Lowest albumin = 3 g/dL at 2/17/2025  5:39 AM, will monitor as appropriate  # Coagulation Defect: INR = 1.36 (Ref range: 0.85 - 1.15) and/or PTT = N/A, will monitor for bleeding  # Drug Induced Platelet Defect: home medication list includes an antiplatelet medication   # Hypertension: Home medication list includes antihypertensive(s)            # Overweight: Estimated body mass index is 29.54 kg/m  as calculated from the following:    Height as of this encounter: 1.803 m (5' 11\").    Weight as of this encounter: 96.1 kg (211 lb 12.8 oz).             Social Drivers of Health    Tobacco Use: Medium Risk (2/16/2025)    Received from [employer]    Patient History     Smoking Tobacco Use: Former     Smokeless Tobacco Use: Never    [facility]    Social Connections          Disposition Plan         Medically Ready for Discharge: Anticipated in 2-4 Days    The patient's care was discussed with the Bedside Nurse, Patient, and Patient's Family.    Isabelle Hussein MD  Hospitalist Service  [facility]  Securely message with [employer] (more info)  Text page via [employer] Paging/Directory   ______________________________________________________________________    Interval History   Patient is in the " bed  Doing well.  Denies any nausea vomiting.  Mild discomfort at the drain site otherwise no abdominal pain.  No other acute issues      Physical Exam   Vital Signs: Temp: 98.3  F (36.8  C) Temp src: Oral BP: 133/89 Pulse: 60   Resp: 18 SpO2: 99 % O2 Device: None (Room air)    Weight: 211 lbs 12.8 oz    GENERAL:  Comfortable.  PSYCH: pleasant, oriented, No acute distress.  HEART:  Normal S1, S2 with no murmur, no pericardial rub, gallops or S3 or S4.  LUNGS:  Clear to auscultation, normal Respiratory effort. No wheezing, rales or ronchi.  GI:  Soft, nontender, nondistended, NADIA drain in place normal bowel sounds.  NADIA drain in place  EXTREMITIES:  No pedal edema  SKIN:  Dry to touch, No rash, wound or ulcerations.    Medical Decision Making       65 MINUTES SPENT BY ME on the date of service doing chart review, history, exam, documentation & further activities per the note.      Data         Imaging results reviewed over the past 24 hrs:   No results found for this or any previous visit (from the past 24 hours).

## 2025-02-24 VITALS
SYSTOLIC BLOOD PRESSURE: 145 MMHG | RESPIRATION RATE: 16 BRPM | TEMPERATURE: 98.3 F | HEIGHT: 71 IN | BODY MASS INDEX: 29.58 KG/M2 | OXYGEN SATURATION: 97 % | WEIGHT: 211.3 LBS | HEART RATE: 66 BPM | DIASTOLIC BLOOD PRESSURE: 85 MMHG

## 2025-02-24 LAB
ANION GAP SERPL CALCULATED.3IONS-SCNC: 11 MMOL/L (ref 7–15)
BACTERIA BLD CULT: NO GROWTH
BACTERIA BLD CULT: NO GROWTH
BUN SERPL-MCNC: 7.6 MG/DL (ref 8–23)
CALCIUM SERPL-MCNC: 8.7 MG/DL (ref 8.8–10.4)
CHLORIDE SERPL-SCNC: 101 MMOL/L (ref 98–107)
CREAT SERPL-MCNC: 1.12 MG/DL (ref 0.67–1.17)
EGFRCR SERPLBLD CKD-EPI 2021: 73 ML/MIN/1.73M2
ERYTHROCYTE [DISTWIDTH] IN BLOOD BY AUTOMATED COUNT: 13.9 % (ref 10–15)
GLUCOSE SERPL-MCNC: 104 MG/DL (ref 70–99)
HCO3 SERPL-SCNC: 27 MMOL/L (ref 22–29)
HCT VFR BLD AUTO: 34 % (ref 40–53)
HGB BLD-MCNC: 10.9 G/DL (ref 13.3–17.7)
MCH RBC QN AUTO: 27.8 PG (ref 26.5–33)
MCHC RBC AUTO-ENTMCNC: 32.1 G/DL (ref 31.5–36.5)
MCV RBC AUTO: 87 FL (ref 78–100)
PLATELET # BLD AUTO: 412 10E3/UL (ref 150–450)
POTASSIUM SERPL-SCNC: 3.4 MMOL/L (ref 3.4–5.3)
RBC # BLD AUTO: 3.92 10E6/UL (ref 4.4–5.9)
SODIUM SERPL-SCNC: 139 MMOL/L (ref 135–145)
WBC # BLD AUTO: 8.1 10E3/UL (ref 4–11)

## 2025-02-24 PROCEDURE — 250N000013 HC RX MED GY IP 250 OP 250 PS 637: Performed by: INTERNAL MEDICINE

## 2025-02-24 PROCEDURE — 250N000011 HC RX IP 250 OP 636: Performed by: INTERNAL MEDICINE

## 2025-02-24 PROCEDURE — 82310 ASSAY OF CALCIUM: CPT | Performed by: PHYSICIAN ASSISTANT

## 2025-02-24 PROCEDURE — 99239 HOSP IP/OBS DSCHRG MGMT >30: CPT | Performed by: PHYSICIAN ASSISTANT

## 2025-02-24 PROCEDURE — 36415 COLL VENOUS BLD VENIPUNCTURE: CPT | Performed by: PHYSICIAN ASSISTANT

## 2025-02-24 PROCEDURE — 85027 COMPLETE CBC AUTOMATED: CPT | Performed by: PHYSICIAN ASSISTANT

## 2025-02-24 PROCEDURE — 250N000011 HC RX IP 250 OP 636: Mod: JZ | Performed by: NURSE PRACTITIONER

## 2025-02-24 PROCEDURE — 80048 BASIC METABOLIC PNL TOTAL CA: CPT | Performed by: PHYSICIAN ASSISTANT

## 2025-02-24 PROCEDURE — 250N000011 HC RX IP 250 OP 636: Performed by: HOSPITALIST

## 2025-02-24 PROCEDURE — 250N000013 HC RX MED GY IP 250 OP 250 PS 637: Performed by: HOSPITALIST

## 2025-02-24 RX ORDER — DILTIAZEM HYDROCHLORIDE 180 MG/1
180 CAPSULE, EXTENDED RELEASE ORAL DAILY
Qty: 30 CAPSULE | Refills: 0 | Status: SHIPPED | OUTPATIENT
Start: 2025-02-24

## 2025-02-24 RX ORDER — ACETAMINOPHEN 325 MG/1
650 TABLET ORAL EVERY 4 HOURS PRN
COMMUNITY
Start: 2025-02-24

## 2025-02-24 RX ORDER — AMOXICILLIN 250 MG
1 CAPSULE ORAL 2 TIMES DAILY PRN
COMMUNITY
Start: 2025-02-24

## 2025-02-24 RX ORDER — PANTOPRAZOLE SODIUM 40 MG/1
40 TABLET, DELAYED RELEASE ORAL
Qty: 30 TABLET | Refills: 0 | Status: SHIPPED | OUTPATIENT
Start: 2025-02-25

## 2025-02-24 RX ORDER — CEFTRIAXONE 2 G/1
2 INJECTION, POWDER, FOR SOLUTION INTRAMUSCULAR; INTRAVENOUS EVERY 24 HOURS
Status: DISCONTINUED | OUTPATIENT
Start: 2025-02-24 | End: 2025-02-24 | Stop reason: HOSPADM

## 2025-02-24 RX ORDER — LOSARTAN POTASSIUM 100 MG/1
50 TABLET ORAL DAILY
COMMUNITY
Start: 2025-02-24

## 2025-02-24 RX ADMIN — PANTOPRAZOLE SODIUM 40 MG: 40 TABLET, DELAYED RELEASE ORAL at 06:32

## 2025-02-24 RX ADMIN — CEFTRIAXONE 2 G: 2 INJECTION, POWDER, FOR SOLUTION INTRAMUSCULAR; INTRAVENOUS at 12:52

## 2025-02-24 RX ADMIN — PIPERACILLIN AND TAZOBACTAM 3.38 G: 3; .375 INJECTION, POWDER, FOR SOLUTION INTRAVENOUS at 06:32

## 2025-02-24 RX ADMIN — DILTIAZEM HYDROCHLORIDE 180 MG: 180 CAPSULE, COATED, EXTENDED RELEASE ORAL at 10:04

## 2025-02-24 RX ADMIN — PIPERACILLIN AND TAZOBACTAM 3.38 G: 3; .375 INJECTION, POWDER, FOR SOLUTION INTRAVENOUS at 00:13

## 2025-02-24 RX ADMIN — ASPIRIN 81 MG: 81 TABLET, COATED ORAL at 10:04

## 2025-02-24 RX ADMIN — LEVOTHYROXINE SODIUM 50 MCG: 0.05 TABLET ORAL at 10:04

## 2025-02-24 RX ADMIN — ALTEPLASE 2 MG: 2.2 INJECTION, POWDER, LYOPHILIZED, FOR SOLUTION INTRAVENOUS at 14:17

## 2025-02-24 ASSESSMENT — ACTIVITIES OF DAILY LIVING (ADL)
ADLS_ACUITY_SCORE: 37

## 2025-02-24 NOTE — PLAN OF CARE
Goal Outcome Evaluation:      Plan of Care Reviewed With: patient    Overall Patient Progress: improvingOverall Patient Progress: improving    Outcome Evaluation: AOx4. Up ad olivier- ambulating halls during shift. Denies pain. NADIA to suction- Alteplase administered this afternoon- left to dwell for 30 min. Output minimal in NADIA- pink tinged. IV abx changed to rocephin- adminsitered. Afebrile. Repeat CT abd 3 days after alteplase. ID, following. Plan for possible discharge home on PO abx with outpatient f/u.      Problem: Adult Inpatient Plan of Care  Goal: Plan of Care Review  Description: The Plan of Care Review/Shift note should be completed every shift.  The Outcome Evaluation is a brief statement about your assessment that the patient is improving, declining, or no change.  This information will be displayed automatically on your shift  note.  Recent Flowsheet Documentation  Taken 2/24/2025 1455 by Kristyn Seymour RN  Outcome Evaluation: AOx4. Up ad olivier- ambulating halls during shift. Denies pain. NADIA to suction- Alteplase administered this afternoon- left to dwell for 30 min. Output minimal in NADIA- pink tinged. IV abx changed to rocephin- adminsitered. Afebrile. Repeat CT abd 3 days after alteplase. ID, following. Plan for possible discharge home on PO abx with outpatient f/u.  Plan of Care Reviewed With: patient  Overall Patient Progress: improving  Goal: Absence of Hospital-Acquired Illness or Injury  Intervention: Identify and Manage Fall Risk  Recent Flowsheet Documentation  Taken 2/24/2025 0958 by Kristyn Seymour RN  Safety Promotion/Fall Prevention:   assistive device/personal items within reach   clutter free environment maintained   nonskid shoes/slippers when out of bed   safety round/check completed  Intervention: Prevent Skin Injury  Recent Flowsheet Documentation  Taken 2/24/2025 0958 by Kristyn Seymour RN  Body Position: position changed independently

## 2025-02-24 NOTE — PROGRESS NOTES
"[facility]  Infectious Disease Progress Note    Date of Service (when I saw the patient): 02/24/2025     Assessment & Plan   Victoriano Bourgeois is a 64 year old male who was admitted on 2/16/2025.     Impression:  53 yo with PMH of HTN, HLD, STEMI, CAD s/p PCI x 2, and hypothyroidism   Presented with reported fever, chills   MRCP/CT show: duodenitis vs ulcer with some air seen on CT   Repeated CT with contrast: likely contained duodenal perforation with hepatic abscess  S/p drain placement   On zosyn   Blood culture with strep intermedius   Abscesses cultures growing Strep intermedius      Recommendations   Discussed with hospitalist, patient is being discharged today IR wanting to do a repeat CT scan later this week    Okay to switch to oral Augmentin  Follow-up CT scan planned for end of this week  Follow-up in ID clinic in 2 weeks  Patient to call us Friday  Please dispense at least 2 weeks of Augmentin and based on CT scan might need more      Dank Camarena MD    Interval History   Fevers are down  Abscess cx with Strep intermedius    New CT showed, \" Interval placement of an appropriately positioned left transhepatic drain into the previous caudate lobe abscess. The abscess has decreased in size, with a small amount of fluid and gas remaining within the abscess cavity surrounding the drain.   2.  Interval increase in size of a fluid collection along the pancreatic neck, which now extends along the surface of the caudate lobe and measures up to 8.7 cm..  A tiny focus of extraluminal gas and fluid within the portacaval space has not significantly changed.  No additional perihepatic or periduodenal fluid collections.  Small amount of ascites is similar in volume. No free intraperitoneal air.  New small bilateral pleural effusions with adjacent atelectasis.No new rashes or issues with antibiotics   Labs reviewed       No changes to past medical, social or family history   Feels better but still " had a fever this morning   IR team thought the saline drain flushes might be going into the caudate lobe area and leading to increased fluid collection.  There is still the drain flushes reduced to 5 mL normal saline daily from 3 times daily on 2 22-25  IR team recommended repeating CT abdomen in the next few days    Physical Exam   Temp: 98.9  F (37.2  C) Temp src: Oral BP: 131/81 Pulse: 80   Resp: 20 SpO2: 97 % O2 Device: None (Room air)    Vitals:    02/16/25 1659 02/17/25 0013 02/24/25 0958   Weight: 94.8 kg (208 lb 15.9 oz) 96.1 kg (211 lb 12.8 oz) 95.8 kg (211 lb 4.8 oz)     Vital Signs with Ranges  Temp:  [98.6  F (37  C)-98.9  F (37.2  C)] 98.9  F (37.2  C)  Pulse:  [59-80] 80  Resp:  [18-20] 20  BP: (131-142)/(78-84) 131/81  SpO2:  [95 %-98 %] 97 %    Medications   Current Facility-Administered Medications   Medication Dose Route Frequency Provider Last Rate Last Admin     Current Facility-Administered Medications   Medication Dose Route Frequency Provider Last Rate Last Admin    aspirin EC tablet 81 mg  81 mg Oral Daily Lino Mullins MD   81 mg at 02/23/25 0825    diltiazem ER COATED BEADS (CARDIZEM CD/CARTIA XT) 24 hr capsule 180 mg  180 mg Oral Daily Lino Mullins MD   180 mg at 02/23/25 0825    levothyroxine (SYNTHROID/LEVOTHROID) tablet 50 mcg  50 mcg Oral Daily Elías Chavarria MD   50 mcg at 02/23/25 0825    [Held by provider] losartan (COZAAR) tablet 100 mg  100 mg Oral Daily Zenaida Napoles PA-C        pantoprazole (PROTONIX) EC tablet 40 mg  40 mg Oral QAM AC Lino Mullins MD   40 mg at 02/24/25 0632    piperacillin-tazobactam (ZOSYN) 3.375 g vial to attach to  mL bag  3.375 g Intravenous Q6H Lino Mullins MD   3.375 g at 02/24/25 0632    sodium chloride (PF) 0.9% PF flush 10 mL  10 mL Irrigation Q8H [facility] CNP   10 mL at 02/23/25 0236    sodium chloride (PF) 0.9% PF flush 10 mL  10 mL Irrigation Q8H Winston Goldberg MD   10 mL at  "02/23/25 0832    sodium chloride (PF) 0.9% PF flush 3 mL  3 mL Intracatheter Q8H Elías Chavarria MD   3 mL at 02/23/25 0833       Data   All microbiology laboratory data reviewed.  Recent Labs   Lab Test 02/22/25  0712 02/21/25  0636 02/20/25  0839   WBC 8.3 9.8 10.6   HGB 10.0* 9.7* 10.3*   HCT 31.0* 29.6* 31.7*   MCV 86 85 85    239 237     Recent Labs   Lab Test 02/24/25  0907 02/22/25  0712 02/21/25  0636   CR 1.12 0.98 1.09     Recent Labs   Lab Test 08/28/19  1333   SED 10     No lab results found.    Invalid input(s): \"UC\"    All cultures:  Recent Labs   Lab 02/19/25  1321 02/19/25  1222   CULTURE No growth after 4 days  No growth after 4 days No anaerobic organisms isolated after 3 days  4+ Streptococcus intermedius*      Blood culture:  Results for orders placed or performed during the hospital encounter of 02/16/25   Blood Culture Arm, Right    Collection Time: 02/19/25  1:21 PM    Specimen: Arm, Right; Blood   Result Value Ref Range    Culture No growth after 4 days    Blood Culture Arm, Right    Collection Time: 02/19/25  1:21 PM    Specimen: Arm, Right; Blood   Result Value Ref Range    Culture No growth after 4 days    Blood Culture Arm, Right    Collection Time: 02/16/25  7:00 PM    Specimen: Arm, Right; Blood   Result Value Ref Range    Culture Positive on the 2nd day of incubation (A)     Culture Streptococcus intermedius (AA)        Susceptibility    Streptococcus intermedius - SHEILA     Penicillin 0.047 Susceptible ug/mL     Ceftriaxone 0.125 Susceptible ug/mL     Meropenem 0.094 Susceptible ug/mL     Vancomycin 1.0 Susceptible ug/mL     Cefotaxime 0.125 Susceptible ug/mL   Blood Culture Peripheral Blood    Collection Time: 02/16/25  5:16 PM    Specimen: Peripheral Blood   Result Value Ref Range    Culture No Growth       Urine culture:  Results for orders placed or performed during the hospital encounter of 02/16/25   Urine Culture    Collection Time: 02/16/25  8:01 PM    Specimen: " Urine, NOS   Result Value Ref Range    Culture <10,000 CFU/mL Mixture of Urogenital Oksana

## 2025-02-24 NOTE — PROGRESS NOTES
"SPIRITUAL HEALTH SERVICES - Progress Note  Obs 2    Referral Source/ Reason for Visit: Length of stay visit for emotional support.    Summary and Recommendations -     Reflectively listened to patient engage in life review.    He is happy living with his significant other in their new home in [place].    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    Patient is standing on the summit of many life achievements; and now wants to look ahead toward a chapter where he can have the flexibility to do some things on his personal list.  He hopes his health will allow for this.    Plan: He has no spiritual needs at this time.  [facility] remains available for further support upon request.    Rev. KIERA Liu.  Staff     [facility] available 24/7 for emergent requests/ referrals, either by paging the on-call  or by entering an ASAP/STAT consult in [employer], which will also page the on-call .      Assessment    Saw pt Victoriano Bourgeois regarding length of stay visit for emotional support.    Patient/Family Understanding of Illness and Goals of Care - He understands that he is at  due to sepsis, kidney failure, dehydration and other hospital problems.    Distress and Loss - Patient is worried that there may be more going on with his health than sepsis.  He is anxiously awaiting more information from his medical team.    Strengths, Coping and Resources - He named his significant other and two adult children as core to his support network.    Meaning, Beliefs and Spirituality - Patient describes himself as, \"Spiritual, with Jain roots.\"  On occasion, he attends mass at [facility] in [place].        "

## 2025-02-24 NOTE — DISCHARGE SUMMARY
"[facility]  Hospitalist Discharge Summary      Date of Admission:  2/16/2025  Date of Discharge:  2/24/2025  Discharging Provider: Liane Ayers PA-C  Discharge Service: Hospitalist Service    Discharge Diagnoses   ***    Clinically Significant Risk Factors     # Overweight: Estimated body mass index is 29.47 kg/m  as calculated from the following:    Height as of this encounter: 1.803 m (5' 11\").    Weight as of this encounter: 95.8 kg (211 lb 4.8 oz).       Follow-ups Needed After Discharge   Follow-up Appointments       Follow-up and recommended labs and tests       Follow up with primary care provider, Jessika Bernal, within 7 days for hospital follow- up.  The following labs/tests are recommended: Repeat CT scan in 1-1.5 weeks.    Follow with Dr. Camarena Infectious disease in 2 weeks after you have had your CT abd/pelvis:   [facility] [place] [address]  Phone: [phone]            {Additional follow-up instructions/to-do's for PCP    :***}    Unresulted Labs Ordered in the Past 30 Days of this Admission       Date and Time Order Name Status Description    2/19/2025 10:41 AM Anaerobic Bacterial Culture Routine Preliminary     2/19/2025 10:41 AM Abscess Aerobic Bacterial Culture Routine With Gram Stain Preliminary         These results will be followed up by ***    Discharge Disposition   {Discharge to:1077616::\"Discharged to home\"}  Condition at discharge: {CONDITION:260742::\"Stable\"}    Hospital Course   {OPTIONAL -- use to select A&P section   :181898}    Consultations This Hospital Stay   GASTROENTEROLOGY IP CONSULT  SURGERY GENERAL IP CONSULT  INTERVENTIONAL RADIOLOGY ADULT/PEDS IP CONSULT  INFECTIOUS DISEASES IP CONSULT  INTERVENTIONAL RADIOLOGY ADULT/PEDS IP CONSULT  INTERVENTIONAL RADIOLOGY ADULT/PEDS IP CONSULT    Code Status   Full Code    Time Spent on this Encounter   {How much time did you spend on discharge?:70692647}       Liane Ayers, " MANISHA  [facility] OBSERVATION DEPT  [address] [place] [address]  Phone: [phone]  ______________________________________________________________________    Physical Exam   Vital Signs: Temp: 98.3  F (36.8  C) Temp src: Oral BP: (!) 145/85 Pulse: 66   Resp: 16 SpO2: 97 % O2 Device: None (Room air)    Weight: 211 lbs 4.8 oz  {Recommend personal SmartPhrase or Notewriter for exam (OPTIONAL)   :428580}       Primary Care Physician   Jessika Bernal    Discharge Orders      Reason for your hospital stay    You were admitted for concerns of fever, abd pain, sepsis. You underwent imaging with MRI and CT scan of the abd that ultimately showed evidence of hepatic abscess. You were seen by GI and general surgery and at this time there is no indication for surgery. Infectious disease recommends cont drainage of the abscess with the drain while continuing oral antibiotics. You are now stable to discharge with home therapies.   You will be discharged home with 2 weeks of Augmentin 2x/day. Your blood pressure has been on the lower side since admission so your Diltiazem has been cut to 180mg daily as well as your losartan is also but in half to 50mg. Check your BP daily and if you are consistently seeing higher blood pressures ie > 130/80 then increase your diltiazem and losartan to it's previous dosing.     You will go home with a drain. Routine drain care as scheduled which is 5cc saline flush daily.   An outpatient CT abdomin pelvis has been ordered for you at [facility]. Get the CT scan and follow up with Dr. Camarena at Infectious disease after your scan in 2 weeks.     Follow-up and recommended labs and tests     Follow up with primary care provider, Jessika Bernal, within 7 days for hospital follow- up.  The following labs/tests are recommended: Repeat CT scan in 1-1.5 weeks.    Follow with Dr. Camarena Infectious disease in 2 weeks after you have had your CT abd/pelvis:   [facility]  [place] [address]  Phone: [phone]     Activity    Your activity upon discharge: activity as tolerated     Tubes and Drains    Current Tubes and Drains:     Drain  Duration           Drain Closed/Suction 1 Superior;Midline Other (Comment) Bulb 10 French 5   days              Diet    Follow this diet upon discharge: Current Diet:Orders Placed This Encounter      Advance Diet as Tolerated: Regular Diet Adult       Significant Results and Procedures   {Data for Discharge Summary:757557}    Discharge Medications   Current Discharge Medication List        START taking these medications    Details   acetaminophen (TYLENOL) 325 MG tablet Take 2 tablets (650 mg) by mouth every 4 hours as needed for mild pain or other (and adjunct with moderate or severe pain or per patient request).    Associated Diagnoses: Liver abscess      amoxicillin-clavulanate (AUGMENTIN) 875-125 MG tablet Take 1 tablet by mouth 2 times daily for 14 days.  Qty: 28 tablet, Refills: 0    Associated Diagnoses: Liver abscess      senna-docusate (SENOKOT-S/PERICOLACE) 8.6-50 MG tablet Take 1 tablet by mouth 2 times daily as needed for constipation.    Associated Diagnoses: Liver abscess           CONTINUE these medications which have CHANGED    Details   diltiazem ER (DILT-XR) 180 MG 24 hr capsule Take 1 capsule (180 mg) by mouth daily.  Qty: 30 capsule, Refills: 0    Associated Diagnoses: Hypertension, unspecified type      losartan (COZAAR) 100 MG tablet Take 0.5 tablets (50 mg) by mouth daily. Ok to resume to a full dose if BP is consistently > 130 as the systolic number or bottom number consistently > 80           CONTINUE these medications which have NOT CHANGED    Details   cetirizine (ZYRTEC) 10 MG tablet Take 10 mg by mouth daily as needed for allergies.      evolocumab (REPATHA) 140 MG/ML prefilled autoinjector Inject 140 mg subcutaneously every 14 days.      levothyroxine (SYNTHROID/LEVOTHROID) 50 MCG tablet Take 50 mcg by mouth  every morning (before breakfast).      nitroGLYcerin (NITROSTAT) 0.4 MG sublingual tablet Place 0.4 mg under the tongue as needed      omeprazole (PRILOSEC) 20 MG DR capsule Take 20 mg by mouth daily as needed.      aspirin 81 MG EC tablet Take 81 mg by mouth daily           Allergies   Allergies   Allergen Reactions    Chlorthalidone Muscle Pain (Myalgia)     Cramping severe    Metoprolol Other (See Comments)     Bloating, weight gain, heart burn.    Amlodipine Rash    Carvedilol Other (See Comments)     Wt gain, bloating, loss of appetite    Isosorbide Nitrate Headache    Codeine Unknown     Other Reaction(s): *Unknown    Statins Muscle Pain (Myalgia)    Atorvastatin Muscle Pain (Myalgia)      Superior;Midline Other (Comment) Bulb 10 French 5   days              Diet    Follow this diet upon discharge: Current Diet:Orders Placed This Encounter      Advance Diet as Tolerated: Regular Diet Adult       Significant Results and Procedures   Most Recent 3 CBC's:  Recent Labs   Lab Test 02/24/25  0907 02/22/25  0712 02/21/25  0636   WBC 8.1 8.3 9.8   HGB 10.9* 10.0* 9.7*   MCV 87 86 85    266 239     Most Recent 3 BMP's:  Recent Labs   Lab Test 03/05/25  1615 02/24/25  0907 02/22/25  0712    139 139   POTASSIUM 4.4 3.4 3.5   CHLORIDE 102 101 104   CO2 25 27 25   BUN 16.4 7.6* 10.8   CR 1.19* 1.12 0.98   ANIONGAP 13 11 10   ТАТЬЯНА 9.7 8.7* 8.1*   * 104* 104*     Most Recent 2 LFT's:  Recent Labs   Lab Test 03/05/25  1615 02/18/25  0624   AST 25 71*   ALT 33 60   ALKPHOS 60 43   BILITOTAL 0.3 0.3       Discharge Medications   Current Discharge Medication List        START taking these medications    Details   acetaminophen (TYLENOL) 325 MG tablet Take 2 tablets (650 mg) by mouth every 4 hours as needed for mild pain or other (and adjunct with moderate or severe pain or per patient request).    Associated Diagnoses: Liver abscess      amoxicillin-clavulanate (AUGMENTIN) 875-125 MG tablet Take 1 tablet by mouth 2 times daily for 14 days.  Qty: 28 tablet, Refills: 0    Associated Diagnoses: Liver abscess      senna-docusate (SENOKOT-S/PERICOLACE) 8.6-50 MG tablet Take 1 tablet by mouth 2 times daily as needed for constipation.    Associated Diagnoses: Liver abscess           CONTINUE these medications which have CHANGED    Details   diltiazem ER (DILT-XR) 180 MG 24 hr capsule Take 1 capsule (180 mg) by mouth daily.  Qty: 30 capsule, Refills: 0    Associated Diagnoses: Hypertension, unspecified type      losartan (COZAAR) 100 MG tablet Take 0.5 tablets (50 mg) by mouth daily. Ok to resume to a full dose if BP is consistently > 130 as the systolic number or bottom number consistently > 80            CONTINUE these medications which have NOT CHANGED    Details   cetirizine (ZYRTEC) 10 MG tablet Take 10 mg by mouth daily as needed for allergies.      evolocumab (REPATHA) 140 MG/ML prefilled autoinjector Inject 140 mg subcutaneously every 14 days.      levothyroxine (SYNTHROID/LEVOTHROID) 50 MCG tablet Take 50 mcg by mouth every morning (before breakfast).      nitroGLYcerin (NITROSTAT) 0.4 MG sublingual tablet Place 0.4 mg under the tongue as needed      omeprazole (PRILOSEC) 20 MG DR capsule Take 20 mg by mouth daily as needed.      aspirin 81 MG EC tablet Take 81 mg by mouth daily           Allergies   Allergies   Allergen Reactions    Chlorthalidone Muscle Pain (Myalgia)     Cramping severe    Metoprolol Other (See Comments)     Bloating, weight gain, heart burn.    Amlodipine Rash    Carvedilol Other (See Comments)     Wt gain, bloating, loss of appetite    Isosorbide Nitrate Headache    Codeine Unknown     Other Reaction(s): *Unknown    Statins Muscle Pain (Myalgia)    Atorvastatin Muscle Pain (Myalgia)

## 2025-02-24 NOTE — CONSULTS
CLINICAL NUTRITION SERVICES - ASSESSMENT NOTE    Malnutrition Status:    Malnutrition Diagnosis: Patient does not meet two of the established criteria necessary for diagnosing malnutrition    Malnutrition Present on Admission: No    Registered Dietitian Interventions:  Continue current diet order per provider    Nutrition supplements: Declined need for supplements at this time    Recommend smaller, more frequent meals in the event that appetite decreases again. Discussed multiple options for meeting nutritional needs should appetite decline.     Ordered updated weight       REASON FOR ASSESSMENT  LOS    PMH: HTN, HLD, STEMI, CAD s/p PCI x 2, hypothyroidism      Per EMR, pt presented to ED on 2/16 with reported fever, chills, and lack of appetite and has findings concerning for sepsis of unknown source and acute renal failure secondary to dehydration   Pt was diagnosed with COVID 3 weeks PTA. Had only been eating 1 meal per day PTA.     2/18: CT - showed likely contained duodenal perforation w/ hepatic abscess  2/19: hepatic abscess drain placement    SUBJECTIVE INFORMATION  Assessed patient in room.    NUTRITION HISTORY  - Information obtained from chart review and pt at bedside.  - Diet at home: Eats regular diet at home  - Usual intakes: Over the last several weeks, was eating 1 meal per day and reports that he was eating 50% of that one meal. Stated that it had been going on for several weeks.  - Barriers to PO intakes: Lack of appetite, lack of interest in food.  - Use of oral supplements: None noted  - Chewing/swallowing issues: None noted  - Meal preparation/grocery shopping: Self  - Allergies: NKFA      CURRENT NUTRITION ORDERS  Diet: Regular (since 2/19)    CURRENT INTAKE/TOLERANCE  Per nursing flowsheet, 100% intakes and good appetite recently. For the first few days of admission, pt with 50% intakes typically.     Per [facility], over the last 3 days, pt really only ordering 1 meal per day (breakfast).  "Prior to that, had been ordering usually BID-TID. Meals are fairly small and lacking in protein.     Per discussion with pt in room, his appetite has improved significantly and he feels as though he has been eating very well. He stated that he has had meals brought in 1-2 times per day (mentioned pizza, chicken sandwiches). Reported that he is eating 3 meals per day.  He stated that he is feeling very hungry and is confident in his ability to keep up with intakes upon discharge. We discussed strategies for optimizing intake in the event that he does experience a decreased appetite at some point in the next several weeks. Quang verbalized understanding.     LABS  Nutrition-relevant labs: Reviewed  Noted:     MEDICATIONS  Nutrition-relevant medications: Reviewed      ANTHROPOMETRICS  Height: 180.3 cm (5' 11\")  Most Recent Weight: 96.1 kg (211 lb 12.8 oz)  IBW: 75.3 kg  % IBW: 128%  BMI (kg/m ): Body mass index is 29.54 kg/m . (Overweight BMI 25-29.9)  Weight History:  Wt Readings from Last 10 Encounters:   02/17/25 96.1 kg (211 lb 12.8 oz)   04/05/24 100.8 kg (222 lb 4.8 oz)   02/13/24 103.9 kg (229 lb)     Last weight from 2/17. Ordering updated weight.     No additional weight hx available per chart review.       ASSESSED NUTRITION NEEDS  Dosing Weight: 96.1 kg, based on actual wt  Estimated Energy Needs: 20 - 25 kcals/kg  Justification: Maintenance  Estimated Protein Needs: 1 - 1.2 grams of pro/kg  Justification:  Preservation of lean mass and Maintenance  Estimated Fluid Needs: Per provider    SYSTEM FINDINGS    Skin/wounds:  Edema: None currently documented  Pressure Injury: None currently documented  Non-Healing Wound(s): None currently documented  Surgical Wound(s): None currently documented    GI symptoms:   Stooling Patterns Reviewed - only BM this admission noted on 2/19.    MALNUTRITION  % Weight Loss: None noted  % Intake: No decreased intake noted acutely. Was not eating well PTA, but seems to have " "improved since admission.   Subcutaneous Fat Loss: None observed  Muscle Loss: None observed  Fluid Accumulation/Edema: None noted  Malnutrition Diagnosis: Patient does not meet two of the established criteria necessary for diagnosing malnutrition  Malnutrition Present on Admission: No    NUTRITION DIAGNOSIS  Predicted inadequate oral intake related to hx of poor PO PTA and potential for appetite to decrease pending clinical course.     INTERVENTIONS  See nutrition interventions above    Goals  Patient to consume % of nutritionally adequate meal trays TID, or the equivalent with supplements/snacks.     Monitoring/Evaluation  Progress toward goals will be monitored and evaluated per policy.        Lorraine Sharma RD, LD  Clinical Dietitian  [facility] Message Group: \"Dietitian [Yasmeen]\"  Office Phone: [phone]  Pagers: 3rd floor/ICU: [phone]  All other floors: [phone]  Weekend/holiday: [phone]    "

## 2025-02-24 NOTE — CONSULTS
"    IR consult request for \"liver abscess s/p drain placement now with new area fluid collection around pancreatic neck\"     He was admitted with sepsis on 2/16/25 with a variety of issues and found to have duodenitis vs ulcer with some air on CT leaning towards a contained duodenal perforation. He is s/p US guided drain placement of a \" left transhepatic of pleural, subxiphoid. Ultimate placement of catheter is successful by completion. Sample sent to the lab for evaluation.\"     CT done 2/22/25 as patient has had continued fevers and a CT was done on 2/22 which demonstrated that the abscess fluid the drain is in is smaller and the fluid collection along the pancreatic neck is larger.     Reviewed imaging with IR Dr Mcpherson who declined placing a drain as there is no safe window to reach this area. Recommends re imaging in 3 days to re evaluate.     He does recommend trying a one time flush of 2 mg TPA in 10 of NS dwell for the current drain as the fluid is almost gone. Orders will be entered.     Total time: 20 minutes     Thanks, Kathryn [facility] Interventional Radiology CNP ([phone]) (phone [phone])       "

## 2025-02-24 NOTE — PLAN OF CARE
Patient is alert and oriented x 4. Denies nausea, vomiting, fever, chills or fatigue. Denies any abd pain. VS WNL and documented on the FS. Lung sounds clear and patient is on RA. Active bowel sounds with LBM today. Patient denies any pain, urgency, and frequency when voiding. Pt is tolerating regular diet. IV SL. NADIA to bulb suction drain in place with 5 ml daily flush orders to irrigate abscess- light pink output noted. Zosyn Q6. Patient denies any pain. Patient is independent in the room with transfers.     Plan: Continue POC.     Goal Outcome Evaluation:    Problem: Adult Inpatient Plan of Care  Goal: Absence of Hospital-Acquired Illness or Injury  Intervention: Identify and Manage Fall Risk  Recent Flowsheet Documentation  Taken 2/23/2025 1700 by Missael Wright RN  Safety Promotion/Fall Prevention:   assistive device/personal items within reach   clutter free environment maintained   nonskid shoes/slippers when out of bed   safety round/check completed  Taken 2/23/2025 0800 by Missael Wright RN  Safety Promotion/Fall Prevention:   assistive device/personal items within reach   clutter free environment maintained   nonskid shoes/slippers when out of bed   safety round/check completed  Intervention: Prevent Skin Injury  Recent Flowsheet Documentation  Taken 2/23/2025 1700 by Missael Wright RN  Body Position: position changed independently  Taken 2/23/2025 0800 by Missael Wright RN  Body Position: position changed independently  Intervention: Prevent Infection  Recent Flowsheet Documentation  Taken 2/23/2025 1700 by Missael Wright RN  Infection Prevention: single patient room provided

## 2025-02-25 LAB
BACTERIA ABSC ANAEROBE+AEROBE CULT: ABNORMAL
GRAM STAIN RESULT: ABNORMAL
GRAM STAIN RESULT: ABNORMAL

## 2025-02-26 LAB — BACTERIA ABSC ANAEROBE+AEROBE CULT: NORMAL

## 2025-03-04 ENCOUNTER — TELEPHONE (OUTPATIENT)
Dept: INTERVENTIONAL RADIOLOGY/VASCULAR | Facility: CLINIC | Age: 65
End: 2025-03-04
Payer: COMMERCIAL

## 2025-03-04 ENCOUNTER — HOSPITAL ENCOUNTER (OUTPATIENT)
Dept: CT IMAGING | Facility: CLINIC | Age: 65
Discharge: HOME | End: 2025-03-04
Attending: INTERNAL MEDICINE
Payer: COMMERCIAL

## 2025-03-04 DIAGNOSIS — K26.9 DUODENAL ULCERATION: ICD-10-CM

## 2025-03-04 PROCEDURE — 250N000011 HC RX IP 250 OP 636: Performed by: INTERNAL MEDICINE

## 2025-03-04 PROCEDURE — 74177 CT ABD & PELVIS W/CONTRAST: CPT

## 2025-03-04 PROCEDURE — 250N000009 HC RX 250: Performed by: INTERNAL MEDICINE

## 2025-03-04 RX ORDER — IOPAMIDOL 755 MG/ML
104 INJECTION, SOLUTION INTRAVASCULAR ONCE
Status: COMPLETED | OUTPATIENT
Start: 2025-03-04 | End: 2025-03-04

## 2025-03-04 RX ADMIN — SODIUM CHLORIDE 70 ML: 9 INJECTION, SOLUTION INTRAVENOUS at 14:30

## 2025-03-04 RX ADMIN — IOPAMIDOL 104 ML: 755 INJECTION, SOLUTION INTRAVENOUS at 14:30

## 2025-03-04 NOTE — TELEPHONE ENCOUNTER
Per review of patients recent imaging ok to removed abscess drain tomorrow per Dr Dalton.  Patient aware.

## 2025-03-05 ENCOUNTER — HOSPITAL ENCOUNTER (OUTPATIENT)
Dept: GENERAL RADIOLOGY | Facility: CLINIC | Age: 65
Discharge: HOME | End: 2025-03-05
Attending: RADIOLOGY
Payer: COMMERCIAL

## 2025-03-05 ENCOUNTER — LAB REQUISITION (OUTPATIENT)
Dept: LAB | Facility: CLINIC | Age: 65
End: 2025-03-05

## 2025-03-05 DIAGNOSIS — K26.5: ICD-10-CM

## 2025-03-05 DIAGNOSIS — Z48.00 DRESSING CHANGE: ICD-10-CM

## 2025-03-05 LAB
ALBUMIN SERPL BCG-MCNC: 4.1 G/DL (ref 3.5–5.2)
ALP SERPL-CCNC: 60 U/L (ref 40–150)
ALT SERPL W P-5'-P-CCNC: 33 U/L (ref 0–70)
ANION GAP SERPL CALCULATED.3IONS-SCNC: 13 MMOL/L (ref 7–15)
AST SERPL W P-5'-P-CCNC: 25 U/L (ref 0–45)
BILIRUB SERPL-MCNC: 0.3 MG/DL
BUN SERPL-MCNC: 16.4 MG/DL (ref 8–23)
CALCIUM SERPL-MCNC: 9.7 MG/DL (ref 8.8–10.4)
CHLORIDE SERPL-SCNC: 102 MMOL/L (ref 98–107)
CREAT SERPL-MCNC: 1.19 MG/DL (ref 0.67–1.17)
CRP SERPL-MCNC: 15.1 MG/L
EGFRCR SERPLBLD CKD-EPI 2021: 68 ML/MIN/1.73M2
GLUCOSE SERPL-MCNC: 123 MG/DL (ref 70–99)
HCO3 SERPL-SCNC: 25 MMOL/L (ref 22–29)
POTASSIUM SERPL-SCNC: 4.4 MMOL/L (ref 3.4–5.3)
PROT SERPL-MCNC: 7.7 G/DL (ref 6.4–8.3)
SODIUM SERPL-SCNC: 140 MMOL/L (ref 135–145)

## 2025-03-05 PROCEDURE — 86140 C-REACTIVE PROTEIN: CPT | Performed by: PHYSICIAN ASSISTANT

## 2025-03-05 PROCEDURE — 80053 COMPREHEN METABOLIC PANEL: CPT | Performed by: PHYSICIAN ASSISTANT

## 2025-03-18 DIAGNOSIS — K75.0 ABSCESS, LIVER: Primary | ICD-10-CM

## 2025-03-19 ENCOUNTER — LAB REQUISITION (OUTPATIENT)
Dept: LAB | Facility: CLINIC | Age: 65
End: 2025-03-19

## 2025-03-19 DIAGNOSIS — I25.10 ATHEROSCLEROTIC HEART DISEASE OF NATIVE CORONARY ARTERY WITHOUT ANGINA PECTORIS: ICD-10-CM

## 2025-03-19 LAB
BASOPHILS # BLD AUTO: 0.1 10E3/UL (ref 0–0.2)
BASOPHILS NFR BLD AUTO: 1 %
EOSINOPHIL # BLD AUTO: 0.1 10E3/UL (ref 0–0.7)
EOSINOPHIL NFR BLD AUTO: 2 %
ERYTHROCYTE [DISTWIDTH] IN BLOOD BY AUTOMATED COUNT: 15.4 % (ref 10–15)
HCT VFR BLD AUTO: 44.4 % (ref 40–53)
HGB BLD-MCNC: 13.7 G/DL (ref 13.3–17.7)
IMM GRANULOCYTES # BLD: 0 10E3/UL
IMM GRANULOCYTES NFR BLD: 1 %
LYMPHOCYTES # BLD AUTO: 1.6 10E3/UL (ref 0.8–5.3)
LYMPHOCYTES NFR BLD AUTO: 28 %
MCH RBC QN AUTO: 27.5 PG (ref 26.5–33)
MCHC RBC AUTO-ENTMCNC: 30.9 G/DL (ref 31.5–36.5)
MCV RBC AUTO: 89 FL (ref 78–100)
MONOCYTES # BLD AUTO: 0.5 10E3/UL (ref 0–1.3)
MONOCYTES NFR BLD AUTO: 9 %
NEUTROPHILS # BLD AUTO: 3.3 10E3/UL (ref 1.6–8.3)
NEUTROPHILS NFR BLD AUTO: 60 %
NRBC # BLD AUTO: 0 10E3/UL
NRBC BLD AUTO-RTO: 0 /100
PLATELET # BLD AUTO: 198 10E3/UL (ref 150–450)
RBC # BLD AUTO: 4.98 10E6/UL (ref 4.4–5.9)
WBC # BLD AUTO: 5.5 10E3/UL (ref 4–11)

## 2025-03-19 PROCEDURE — 86140 C-REACTIVE PROTEIN: CPT | Performed by: PHYSICIAN ASSISTANT

## 2025-03-19 PROCEDURE — 85025 COMPLETE CBC W/AUTO DIFF WBC: CPT | Performed by: PHYSICIAN ASSISTANT

## 2025-03-20 LAB — CRP SERPL-MCNC: <3 MG/L

## (undated) DEVICE — PREP CHLORAPREP 26ML TINTED HI-LITE ORANGE 930815

## (undated) DEVICE — ENDO TROCAR SLEEVE KII Z-THREADED 05X100MM CTS02

## (undated) DEVICE — SURGICEL HEMOSTAT 3X4" NUKNIT 1943

## (undated) DEVICE — ENDO TROCAR OPTICAL ACCESS KII Z-THRD 12X100MM C0R85

## (undated) DEVICE — DRAPE X-RAY TUBE 00-901169-01-OEC

## (undated) DEVICE — ESU ELEC BLADE 2.75" COATED/INSULATED E1455

## (undated) DEVICE — Device

## (undated) DEVICE — ESU GROUND PAD ADULT W/CORD E7507

## (undated) DEVICE — ESU PENCIL W/HOLSTER E2350H

## (undated) DEVICE — GLOVE BIOGEL PI MICRO SZ 7.5 48575

## (undated) DEVICE — SYR 30ML LL W/O NDL 302832

## (undated) DEVICE — CATH CHOLANGIOGRAM 4.5FR TAUT METAL TIP 20018-M55

## (undated) DEVICE — ENDO TROCAR FIRST ENTRY KII FIOS Z-THRD 05X100MM CTF03

## (undated) DEVICE — SUCTION IRR STRYKERFLOW II W/TIP 250-070-520

## (undated) DEVICE — LINEN TOWEL PACK X5 5464

## (undated) DEVICE — ENDO POUCH UNIV RETRIEVAL SYSTEM INZII 10MM CD001

## (undated) DEVICE — CLIP APPLIER ENDO 5MM M/L LIGAMAX EL5ML

## (undated) DEVICE — DRAPE LEGGINGS 8421

## (undated) DEVICE — GLOVE BIOGEL PI MICRO SZ 8.0 48580

## (undated) DEVICE — SU VICRYL 4-0 PS-2 18" UND J496H

## (undated) DEVICE — BAG CLEAR TRASH 1.3M 39X33" P4040C

## (undated) DEVICE — RAD RX ISOVUE 300 (50ML) 61% IOPAMIDOL CHARGE PER ML

## (undated) DEVICE — TUBING IV EXTENSION SET ANESTHESIA 34" MLL 2C6227

## (undated) DEVICE — DECANTER BAG 2002S

## (undated) DEVICE — SOL NACL 0.9% IRRIG 3000ML BAG 2B7477

## (undated) DEVICE — LINEN FULL SHEET 5511

## (undated) DEVICE — LINEN HALF SHEET 5512

## (undated) DEVICE — SOL NACL 0.9% IRRIG 1000ML BOTTLE 2F7124

## (undated) DEVICE — CONNECTOR STOPCOCK 3 WAY MALE LL HI-FLO MX9311L

## (undated) DEVICE — CATH IV ANGIO INTRO 12GA 382277

## (undated) DEVICE — LINEN POUCH DBL 5427

## (undated) DEVICE — SOL NACL 0.9% INJ 250ML BAG 2B1322Q

## (undated) DEVICE — ESU CORD MONOPOLAR 10'  E0510

## (undated) DEVICE — SU VICRYL 0 CT-2 CR 8X18" J727D

## (undated) RX ORDER — EPHEDRINE SULFATE 50 MG/ML
INJECTION, SOLUTION INTRAMUSCULAR; INTRAVENOUS; SUBCUTANEOUS
Status: DISPENSED
Start: 2024-04-05

## (undated) RX ORDER — FENTANYL CITRATE 50 UG/ML
INJECTION, SOLUTION INTRAMUSCULAR; INTRAVENOUS
Status: DISPENSED
Start: 2025-02-19

## (undated) RX ORDER — INDOCYANINE GREEN AND WATER 25 MG
KIT INJECTION
Status: DISPENSED
Start: 2024-04-05

## (undated) RX ORDER — FENTANYL CITRATE-0.9 % NACL/PF 10 MCG/ML
PLASTIC BAG, INJECTION (ML) INTRAVENOUS
Status: DISPENSED
Start: 2024-04-05

## (undated) RX ORDER — FENTANYL CITRATE 50 UG/ML
INJECTION, SOLUTION INTRAMUSCULAR; INTRAVENOUS
Status: DISPENSED
Start: 2024-04-05

## (undated) RX ORDER — BUPIVACAINE HYDROCHLORIDE 5 MG/ML
INJECTION, SOLUTION EPIDURAL; INTRACAUDAL
Status: DISPENSED
Start: 2024-04-05

## (undated) RX ORDER — CEFAZOLIN SODIUM/WATER 2 G/20 ML
SYRINGE (ML) INTRAVENOUS
Status: DISPENSED
Start: 2024-04-05